# Patient Record
Sex: FEMALE | Race: WHITE | Employment: PART TIME | URBAN - METROPOLITAN AREA
[De-identification: names, ages, dates, MRNs, and addresses within clinical notes are randomized per-mention and may not be internally consistent; named-entity substitution may affect disease eponyms.]

---

## 2017-02-08 ENCOUNTER — HOSPITAL ENCOUNTER (OUTPATIENT)
Dept: MRI IMAGING | Facility: HOSPITAL | Age: 57
Discharge: HOME | End: 2017-02-08
Attending: INTERNAL MEDICINE
Payer: COMMERCIAL

## 2017-02-08 ENCOUNTER — HOSPITAL ENCOUNTER (OUTPATIENT)
Dept: MAMMOGRAPHY | Facility: HOSPITAL | Age: 57
Discharge: HOME | End: 2017-02-08
Attending: INTERNAL MEDICINE
Payer: COMMERCIAL

## 2017-02-08 DIAGNOSIS — I89.0 LYMPHEDEMA: ICD-10-CM

## 2017-02-08 DIAGNOSIS — C50.811 CANCER OF OVERLAPPING SITES OF RIGHT FEMALE BREAST (HCC): ICD-10-CM

## 2017-02-08 DIAGNOSIS — R92.2 DENSE BREASTS: ICD-10-CM

## 2017-02-08 LAB — CREAT BLD-MCNC: 1 MG/DL (ref 0.5–1.5)

## 2017-02-08 PROCEDURE — 77066 DX MAMMO INCL CAD BI: CPT

## 2017-02-08 PROCEDURE — 82565 ASSAY OF CREATININE: CPT

## 2017-02-08 PROCEDURE — 77059 MRI BREAST (W+WO) W/CAD BILAT (CPT=77059/0159T): CPT

## 2017-02-08 PROCEDURE — A9575 INJ GADOTERATE MEGLUMI 0.1ML: HCPCS | Performed by: INTERNAL MEDICINE

## 2017-02-08 PROCEDURE — 0159T MRI BREAST (W+WO) W/CAD BILAT (CPT=77059/0159T): CPT

## 2017-02-13 ENCOUNTER — TELEPHONE (OUTPATIENT)
Dept: HEMATOLOGY/ONCOLOGY | Facility: HOSPITAL | Age: 57
End: 2017-02-13

## 2017-02-13 NOTE — TELEPHONE ENCOUNTER
Tracie Guillen with MRI - spoke to  yesterday  She did fall on the exercise machine about 2-3 weeks - trauma accounts for the MRI findings - pain was in this location for several weeks

## 2017-02-14 ENCOUNTER — TELEPHONE (OUTPATIENT)
Dept: OBGYN CLINIC | Facility: CLINIC | Age: 57
End: 2017-02-14

## 2017-03-20 ENCOUNTER — NURSE ONLY (OUTPATIENT)
Dept: HEMATOLOGY/ONCOLOGY | Facility: HOSPITAL | Age: 57
End: 2017-03-20
Attending: INTERNAL MEDICINE
Payer: COMMERCIAL

## 2017-03-20 VITALS
WEIGHT: 137 LBS | SYSTOLIC BLOOD PRESSURE: 126 MMHG | HEART RATE: 56 BPM | HEIGHT: 65.5 IN | RESPIRATION RATE: 16 BRPM | DIASTOLIC BLOOD PRESSURE: 78 MMHG | BODY MASS INDEX: 22.55 KG/M2 | TEMPERATURE: 99 F

## 2017-03-20 DIAGNOSIS — Z79.811 ENCOUNTER FOR MONITORING AROMATASE INHIBITOR THERAPY: ICD-10-CM

## 2017-03-20 DIAGNOSIS — I89.0 LYMPHEDEMA OF ARM: ICD-10-CM

## 2017-03-20 DIAGNOSIS — C50.811 CANCER OF OVERLAPPING SITES OF RIGHT FEMALE BREAST (HCC): ICD-10-CM

## 2017-03-20 DIAGNOSIS — Z45.2 ENCOUNTER FOR ADJUSTMENT OR MANAGEMENT OF VASCULAR ACCESS DEVICE: ICD-10-CM

## 2017-03-20 DIAGNOSIS — R92.2 DENSE BREASTS: ICD-10-CM

## 2017-03-20 DIAGNOSIS — M85.80 OSTEOPENIA DETERMINED BY X-RAY: ICD-10-CM

## 2017-03-20 DIAGNOSIS — C50.811 CANCER OF OVERLAPPING SITES OF RIGHT FEMALE BREAST (HCC): Primary | ICD-10-CM

## 2017-03-20 DIAGNOSIS — Z51.81 ENCOUNTER FOR MONITORING AROMATASE INHIBITOR THERAPY: ICD-10-CM

## 2017-03-20 DIAGNOSIS — D12.6 TUBULOVILLOUS ADENOMA OF COLON: ICD-10-CM

## 2017-03-20 DIAGNOSIS — C68.9 UROTHELIAL CARCINOMA (HCC): ICD-10-CM

## 2017-03-20 DIAGNOSIS — C68.9 UROTHELIAL CARCINOMA (HCC): Primary | ICD-10-CM

## 2017-03-20 LAB
ALBUMIN SERPL BCP-MCNC: 3.8 G/DL (ref 3.5–4.8)
ALBUMIN/GLOB SERPL: 1.4 {RATIO} (ref 1–2)
ALP SERPL-CCNC: 72 U/L (ref 32–100)
ALT SERPL-CCNC: 13 U/L (ref 14–54)
ANION GAP SERPL CALC-SCNC: 8 MMOL/L (ref 0–18)
AST SERPL-CCNC: 20 U/L (ref 15–41)
BASOPHILS # BLD: 0 K/UL (ref 0–0.2)
BASOPHILS NFR BLD: 0 %
BILIRUB SERPL-MCNC: 1 MG/DL (ref 0.3–1.2)
BUN SERPL-MCNC: 18 MG/DL (ref 8–20)
BUN/CREAT SERPL: 18.4 (ref 10–20)
CALCIUM SERPL-MCNC: 9.3 MG/DL (ref 8.5–10.5)
CHLORIDE SERPL-SCNC: 103 MMOL/L (ref 95–110)
CO2 SERPL-SCNC: 27 MMOL/L (ref 22–32)
CREAT SERPL-MCNC: 0.98 MG/DL (ref 0.5–1.5)
EOSINOPHIL # BLD: 0.1 K/UL (ref 0–0.7)
EOSINOPHIL NFR BLD: 3 %
ERYTHROCYTE [DISTWIDTH] IN BLOOD BY AUTOMATED COUNT: 13.7 % (ref 11–15)
GLOBULIN PLAS-MCNC: 2.7 G/DL (ref 2.5–3.7)
GLUCOSE SERPL-MCNC: 97 MG/DL (ref 70–99)
HCT VFR BLD AUTO: 35.9 % (ref 35–48)
HGB BLD-MCNC: 11.8 G/DL (ref 12–16)
LYMPHOCYTES # BLD: 0.8 K/UL (ref 1–4)
LYMPHOCYTES NFR BLD: 21 %
MCH RBC QN AUTO: 29.6 PG (ref 27–32)
MCHC RBC AUTO-ENTMCNC: 33 G/DL (ref 32–37)
MCV RBC AUTO: 89.8 FL (ref 80–100)
MONOCYTES # BLD: 0.2 K/UL (ref 0–1)
MONOCYTES NFR BLD: 6 %
NEUTROPHILS # BLD AUTO: 2.8 K/UL (ref 1.8–7.7)
NEUTROPHILS NFR BLD: 70 %
OSMOLALITY UR CALC.SUM OF ELEC: 288 MOSM/KG (ref 275–295)
PLATELET # BLD AUTO: 186 K/UL (ref 140–400)
PMV BLD AUTO: 8.4 FL (ref 7.4–10.3)
POTASSIUM SERPL-SCNC: 4.4 MMOL/L (ref 3.3–5.1)
PROT SERPL-MCNC: 6.5 G/DL (ref 5.9–8.4)
RBC # BLD AUTO: 4 M/UL (ref 3.7–5.4)
SODIUM SERPL-SCNC: 138 MMOL/L (ref 136–144)
WBC # BLD AUTO: 4 K/UL (ref 4–11)

## 2017-03-20 PROCEDURE — 99212 OFFICE O/P EST SF 10 MIN: CPT | Performed by: INTERNAL MEDICINE

## 2017-03-20 PROCEDURE — 36591 DRAW BLOOD OFF VENOUS DEVICE: CPT

## 2017-03-20 PROCEDURE — 85025 COMPLETE CBC W/AUTO DIFF WBC: CPT

## 2017-03-20 PROCEDURE — 80053 COMPREHEN METABOLIC PANEL: CPT

## 2017-03-20 PROCEDURE — 99214 OFFICE O/P EST MOD 30 MIN: CPT | Performed by: INTERNAL MEDICINE

## 2017-03-20 RX ORDER — HEPARIN SODIUM (PORCINE) LOCK FLUSH IV SOLN 100 UNIT/ML 100 UNIT/ML
SOLUTION INTRAVENOUS
Status: COMPLETED
Start: 2017-03-20 | End: 2017-03-20

## 2017-03-20 RX ORDER — 0.9 % SODIUM CHLORIDE 0.9 %
VIAL (ML) INJECTION
Status: DISCONTINUED
Start: 2017-03-20 | End: 2017-03-20

## 2017-03-20 RX ORDER — HEPARIN SODIUM (PORCINE) LOCK FLUSH IV SOLN 100 UNIT/ML 100 UNIT/ML
5 SOLUTION INTRAVENOUS ONCE
Status: COMPLETED | OUTPATIENT
Start: 2017-03-20 | End: 2017-03-20

## 2017-03-20 RX ADMIN — HEPARIN SODIUM (PORCINE) LOCK FLUSH IV SOLN 100 UNIT/ML 500 UNITS: 100 SOLUTION INTRAVENOUS at 11:39:00

## 2017-03-20 NOTE — PROGRESS NOTES
Pt arrived to lab for port flush and labs. Pt states she has been feeling well, no complaints at this time. Port accessed to chest, positive blood return noted. CBC, CMP drawn and sent to lab. Port flushed with 20 ml of saline and 500 units of Heparin.

## 2017-03-20 NOTE — PROGRESS NOTES
HPI   Dylan Stacy is a 61year old female who returns for follow-up of her urothelial carcinoma and breast cancer. She did have a repeat cystoscopy, CT scans at [facility] in December 2016 as well as a colonoscopy.   She is not aware of a change in either b She is enjoying spending more time with her  and helping with the vacation home    HENT: Negative for congestion, dental problem, mouth sores and sinus pressure. Eyes: Positive for visual disturbance. Wears reading glasses.   Admits to [place] Allergies:   No Known Allergies    Past Medical History   Diagnosis Date   • Cervical dysplasia    • Pregnancy , , 1992     x 3 vag vacuum, sp ,    • Herpes zoster    • Radicular pain      HERPES ZOSTER / MARCAINE DEPO MEDROL T10-12   • Family History   Problem Relation Age of Onset   • Breast Cancer Cousin    • Heart Disorder Father    • Gastro-Intestinal Disorder Mother      diverticulitis   • Genito-Urinary Disorder Sister      urolithiasis   • Diabetes Sister    • Cancer Other      fa Right: No inguinal adenopathy present. Left: No inguinal adenopathy present. Neurological: She is alert and oriented to person, place, and time. No cranial nerve deficit. Skin: Skin is warm and dry. No rash noted. No erythema.    Psychiatric The colonoscopy was repeated in December for a six month study following removal of multiple polyps.        RESULTS:  Component      Latest Ref Rng 3/20/2017   Glucose      70-99 mg/dL 97   Sodium      136-144 mmol/L 138   Potassium      3.3-5.1 mmol/L 4.4 [address] [facility], [address] [place] [address], 1/25/2008, 8:57.  [facility], COLT PF DGTL [place] W CAD, 4/30/2009, 13:54.  [facility], COLT BILATERAL WITH CAD, 11/29/2007, 0:00.  [address] [facility] There are no suspicious areas of enhancement concerning for malignancy.  The visualized axilla demonstrates no morphologically abnormal lymph nodes.         EXTRAMAMMARY FINDINGS:     There is enhancement involving one of the right anterior ribs (54476/100 INDICATIONS: History of right breast cancer 2008 status post lumpectomy, chemotherapy and radiation                                                 therapy.  History of benign left breast biopsy      BREAST COMPOSITION:           CATEGORY c - The breasts ar The preparation of the colon was good.  The visualized colonic mucosa from the cecum to the anal verge was normal with an intact vascular pattern.  The cecal polypectomy site and multiple polyps were addressed as follows:    1.  The cecal polypectomy site GFR 51 potassium 5.6 white blood cell count 4.9 hemoglobin 12.1 platelet count 703,561    Flexible cystoscopy and bladder barbotage 12/14/2016   urethra normal, no evidence of disease in the bladder, bladder barbotage was sent for cytologic analysis and FI Patient received a discharge summary from the technologist after completion  of exam.   Dictated by (CST): Jacobo Reed MD on 2/10/2016 at 8:49  Approved by (CST): Jacobo Reed MD on 2/10/2016 at 8:53  Date of Exam:  02/10/2016     OR Low bone mass (T score between -1.0 and -2.5 at the femoral neck or     spine) and a 10-year probability of a hip fracture > 3% or a 10-year  probability of a major osteoporosis-related fracture > 20% based on the  US-adapted WHO algorithm     CONCLUS the left sided Port-A-Cath along the anterior aspect of the pectoralis. RIGHT:  Bandlike linear scarring in the axillary tail. No suspicious mass or  non-mass enhancement. No suspicious lymphadenopathy.      OTHER:  Prominent normal vascularity left par

## 2017-04-09 RX ORDER — ANASTROZOLE 1 MG/1
1 TABLET ORAL DAILY
Qty: 90 TABLET | Refills: 3 | Status: SHIPPED | OUTPATIENT
Start: 2017-04-09 | End: 2017-07-08

## 2017-04-12 ENCOUNTER — OFFICE VISIT (OUTPATIENT)
Dept: OBGYN CLINIC | Facility: CLINIC | Age: 57
End: 2017-04-12

## 2017-04-12 VITALS
SYSTOLIC BLOOD PRESSURE: 117 MMHG | DIASTOLIC BLOOD PRESSURE: 79 MMHG | BODY MASS INDEX: 21.53 KG/M2 | HEIGHT: 66.75 IN | WEIGHT: 137.19 LBS | HEART RATE: 60 BPM

## 2017-04-12 DIAGNOSIS — Z01.419 ENCOUNTER FOR GYNECOLOGICAL EXAMINATION WITHOUT ABNORMAL FINDING: Primary | ICD-10-CM

## 2017-04-12 DIAGNOSIS — Z85.3 HX: BREAST CANCER: ICD-10-CM

## 2017-04-12 PROCEDURE — 99396 PREV VISIT EST AGE 40-64: CPT | Performed by: OBSTETRICS & GYNECOLOGY

## 2017-04-12 NOTE — PROGRESS NOTES
Tahira Lovelace is a 61year old female [id number] No LMP recorded. Patient has had a hysterectomy. who presents for Patient presents with:  Gyn Exam: annual exam & mammo order  She has no complaints.  She wears a hand brace for lymphedema but otherwise is doing Children: N/A     Occupational History  None on file     Social History Main Topics   Smoking status: Former Smoker     Smokeless tobacco: Never Used    Alcohol Use: Yes  3.0 oz/week    1 Glasses of wine, 4 Standard drinks or equivalent per week         Co or anxiety. Endocrine:   denies excessive thirst or urination. Heme/Lymph:  denies history of anemia, easy bruising or bleeding.       PHYSICAL EXAM:   Constitutional: well developed, well nourished  Head/Face: normocephalic  Neck/Thyroid: thyroid symmetr

## 2017-06-12 ENCOUNTER — NURSE ONLY (OUTPATIENT)
Dept: HEMATOLOGY/ONCOLOGY | Facility: HOSPITAL | Age: 57
End: 2017-06-12
Attending: INTERNAL MEDICINE
Payer: COMMERCIAL

## 2017-06-12 VITALS
HEART RATE: 59 BPM | WEIGHT: 135 LBS | DIASTOLIC BLOOD PRESSURE: 82 MMHG | TEMPERATURE: 98 F | BODY MASS INDEX: 22.22 KG/M2 | HEIGHT: 65.5 IN | SYSTOLIC BLOOD PRESSURE: 132 MMHG | RESPIRATION RATE: 16 BRPM

## 2017-06-12 DIAGNOSIS — C50.811 CANCER OF OVERLAPPING SITES OF RIGHT FEMALE BREAST (HCC): ICD-10-CM

## 2017-06-12 DIAGNOSIS — Z17.0 MALIGNANT NEOPLASM OF OVERLAPPING SITES OF RIGHT BREAST IN FEMALE, ESTROGEN RECEPTOR POSITIVE (HCC): Primary | ICD-10-CM

## 2017-06-12 DIAGNOSIS — C68.9 UROTHELIAL CARCINOMA (HCC): ICD-10-CM

## 2017-06-12 DIAGNOSIS — M85.80 OSTEOPENIA DETERMINED BY X-RAY: ICD-10-CM

## 2017-06-12 DIAGNOSIS — C68.9 UROTHELIAL CARCINOMA (HCC): Primary | ICD-10-CM

## 2017-06-12 DIAGNOSIS — I89.0 LYMPHEDEMA OF ARM: ICD-10-CM

## 2017-06-12 DIAGNOSIS — C50.811 MALIGNANT NEOPLASM OF OVERLAPPING SITES OF RIGHT BREAST IN FEMALE, ESTROGEN RECEPTOR POSITIVE (HCC): Primary | ICD-10-CM

## 2017-06-12 DIAGNOSIS — R92.2 DENSE BREASTS: ICD-10-CM

## 2017-06-12 DIAGNOSIS — Z45.2 ENCOUNTER FOR ADJUSTMENT OR MANAGEMENT OF VASCULAR ACCESS DEVICE: ICD-10-CM

## 2017-06-12 DIAGNOSIS — Z79.811 ENCOUNTER FOR MONITORING AROMATASE INHIBITOR THERAPY: ICD-10-CM

## 2017-06-12 DIAGNOSIS — D12.6 TUBULOVILLOUS ADENOMA OF COLON: ICD-10-CM

## 2017-06-12 DIAGNOSIS — F32.9 REACTIVE DEPRESSION: ICD-10-CM

## 2017-06-12 DIAGNOSIS — Z51.81 ENCOUNTER FOR MONITORING AROMATASE INHIBITOR THERAPY: ICD-10-CM

## 2017-06-12 PROCEDURE — 99212 OFFICE O/P EST SF 10 MIN: CPT | Performed by: INTERNAL MEDICINE

## 2017-06-12 PROCEDURE — 80053 COMPREHEN METABOLIC PANEL: CPT

## 2017-06-12 PROCEDURE — 36591 DRAW BLOOD OFF VENOUS DEVICE: CPT

## 2017-06-12 PROCEDURE — 99214 OFFICE O/P EST MOD 30 MIN: CPT | Performed by: INTERNAL MEDICINE

## 2017-06-12 PROCEDURE — 85025 COMPLETE CBC W/AUTO DIFF WBC: CPT

## 2017-06-12 RX ORDER — 0.9 % SODIUM CHLORIDE 0.9 %
VIAL (ML) INJECTION
Status: DISCONTINUED
Start: 2017-06-12 | End: 2017-06-12

## 2017-06-12 RX ORDER — HEPARIN SODIUM (PORCINE) LOCK FLUSH IV SOLN 100 UNIT/ML 100 UNIT/ML
5 SOLUTION INTRAVENOUS ONCE
Status: COMPLETED | OUTPATIENT
Start: 2017-06-12 | End: 2017-06-12

## 2017-06-12 RX ORDER — HEPARIN SODIUM (PORCINE) LOCK FLUSH IV SOLN 100 UNIT/ML 100 UNIT/ML
SOLUTION INTRAVENOUS
Status: DISCONTINUED
Start: 2017-06-12 | End: 2017-06-12

## 2017-06-12 RX ADMIN — HEPARIN SODIUM (PORCINE) LOCK FLUSH IV SOLN 100 UNIT/ML 500 UNITS: 100 SOLUTION INTRAVENOUS at 13:44:00

## 2017-06-12 NOTE — PROGRESS NOTES
Pt to infusion for port flush and labs. Arrived stable and ambulatory. Pt reports feeling well overall, offers no complaints. Port accessed using sterile technique, line flushing well with positive blood return noted. Labs were collected and sent.  Port flu

## 2017-06-13 NOTE — PROGRESS NOTES
BRYANT Conner is a 61year old female who returns for follow-up of her urothelial carcinoma and breast cancer. She did have a repeat cystoscopy, CT scans at [facility] in December 2016 as well as a colonoscopy.   She is not aware of a change in either b Constitutional: Positive for activity change. Negative for chills, appetite change and unexpected weight change. Exercising with walking only since wrist fracture  Weight gain 7 lbs since December 2016  Continues to work.   She is enjoying spending m Calcium Carbonate-Vitamin D (CALCIUM + D OR) Take 1 tablet by mouth daily. Disp:  Rfl:    ibuprofen (ADVIL) 200 MG Oral Tab Take 200 mg by mouth every 6 (six) hours as needed for Pain.  Disp:  Rfl:      Allergies:   No Known Allergies    Past Medical Histor Drug Use: No    Sexual Activity: Yes    Birth Control/ Protection: Hysterectomy     Other Topics Concern    Caffeine Concern Yes    Comment: COFFEE 1 CUP/DAILY     Social History Narrative     Family History   Problem Relation Age of Onset   • Breast Canc Head (left side): No submental, no submandibular, no preauricular and no posterior auricular adenopathy present. She has no cervical adenopathy. She has no axillary adenopathy. Right: No inguinal adenopathy present.         Left: No ingu She plans to continue the lexapro 10 mg daily. She is again enjoying her family, friends, and work. She and her family are involved in their vacation [address] for residential.       Patient had a DEXA scan in February, 2016, which showed o 0.0-1.0 K/UL 0.3   Eosinophils Absolute      0.0-0.7 K/UL 0.1   Basophils Absolute      0.0-0.2 K/UL 0.0        Final result (2/9/2017  1:45 PM) Open        Study Result      PROCEDURE:   MRI BREAST (W+WO) W/CAD BILAT (CPT=77059/0159T)      COMPARISON: LEFT BREAST: Susceptibility artifact from the biopsy clip marking the site of the benign left breast biopsy is seen in the outer anterior left breast (3/115).  A portacatheter is seen in the subcutaneous tissue of the upper inner left breast/chest wall.   [address] [facility] PF DGTL DIAG W CAD [place], 1/16/2014, 10:21.  [facility] [place], [place] PF DGTL DIAG W CAD [place], 2/06/2015, 9:11.  [facility], MRI BREAST Klaus Arndtnandez Bhandari [address], 3/04/2015, 13:31.   After informed consent, the patient was placed in the left lateral recumbent position.  Digital rectal examination revealed no palpable intraluminal abnormalities.  An Olympus variable stiffness 190 series HD colonoscope was inserted into the rectum and ad Recommendations:  1.  Follow-up biopsy results. 2.  Further recommendations pending        Tony Ferreira MD  12/6/2016      CT 12/14/16 [facility]   Stable size and appearance of two right hepatic lobe lesions compatible with hemangiomas.   Probable cho CONCLUSION:     BI-RADS CATEGORY:  DIAGNOSTIC CATEGORY 2--BENIGN FINDING:     RECOMMENDATIONS:  ROUTINE SCREENING MAMMOGRAM AND CLINICAL EVALUATION. PLEASE NOTE: NORMAL MAMMOGRAM DOES NOT EXCLUDE THE POSSIBILITY OF BREAST  CANCER.   A CLINICALLY S National Osteoporosis Foundation Clinician's Guide to Prevention and  Treatment of Osteoporosis recommendations for treatment:  Post menopausal women and men age 48 and older presenting with the  following should be considered for treatment:       A hip or ENHANCEMENT PATTERN: None or minimal, with <25% of glandular tissue                       demonstrating enhancement. FINDINGS:  LEFT:  A ferromagnetic artifact at 3:00 anterior left breast (image 90 series 2)  related to previous benign biopsy site.  No D. Sigmoid colon polyp:     *  Multiple fragments of tubulovillous adenoma.           TISSUES SUBMITTED                   A. Polyp, cecum   B. Polyp, ascending colon   C. Polyp, hepatic flexure colon   D.  Polyp, sigmoid colon       Meds This Visit:  see Estefany Martins

## 2017-09-06 ENCOUNTER — APPOINTMENT (OUTPATIENT)
Dept: HEMATOLOGY/ONCOLOGY | Facility: HOSPITAL | Age: 57
End: 2017-09-06
Attending: INTERNAL MEDICINE
Payer: COMMERCIAL

## 2017-09-18 ENCOUNTER — APPOINTMENT (OUTPATIENT)
Dept: HEMATOLOGY/ONCOLOGY | Facility: HOSPITAL | Age: 57
End: 2017-09-18
Attending: INTERNAL MEDICINE
Payer: COMMERCIAL

## 2017-09-28 ENCOUNTER — OFFICE VISIT (OUTPATIENT)
Dept: HEMATOLOGY/ONCOLOGY | Facility: HOSPITAL | Age: 57
End: 2017-09-28
Attending: INTERNAL MEDICINE
Payer: COMMERCIAL

## 2017-09-28 VITALS
DIASTOLIC BLOOD PRESSURE: 81 MMHG | TEMPERATURE: 99 F | RESPIRATION RATE: 16 BRPM | SYSTOLIC BLOOD PRESSURE: 119 MMHG | HEIGHT: 65.5 IN | HEART RATE: 56 BPM | WEIGHT: 137 LBS | BODY MASS INDEX: 22.55 KG/M2

## 2017-09-28 DIAGNOSIS — Z17.0 MALIGNANT NEOPLASM OF OVERLAPPING SITES OF RIGHT BREAST IN FEMALE, ESTROGEN RECEPTOR POSITIVE (HCC): ICD-10-CM

## 2017-09-28 DIAGNOSIS — Z17.0 MALIGNANT NEOPLASM OF OVERLAPPING SITES OF RIGHT BREAST IN FEMALE, ESTROGEN RECEPTOR POSITIVE (HCC): Primary | ICD-10-CM

## 2017-09-28 DIAGNOSIS — Z51.81 ENCOUNTER FOR MONITORING AROMATASE INHIBITOR THERAPY: ICD-10-CM

## 2017-09-28 DIAGNOSIS — M85.80 OSTEOPENIA DETERMINED BY X-RAY: ICD-10-CM

## 2017-09-28 DIAGNOSIS — C50.811 MALIGNANT NEOPLASM OF OVERLAPPING SITES OF RIGHT BREAST IN FEMALE, ESTROGEN RECEPTOR POSITIVE (HCC): Primary | ICD-10-CM

## 2017-09-28 DIAGNOSIS — Z79.811 ENCOUNTER FOR MONITORING AROMATASE INHIBITOR THERAPY: ICD-10-CM

## 2017-09-28 DIAGNOSIS — D12.6 TUBULOVILLOUS ADENOMA OF COLON: ICD-10-CM

## 2017-09-28 DIAGNOSIS — C68.9 UROTHELIAL CARCINOMA (HCC): Primary | ICD-10-CM

## 2017-09-28 DIAGNOSIS — Z45.2 ENCOUNTER FOR ADJUSTMENT OR MANAGEMENT OF VASCULAR ACCESS DEVICE: ICD-10-CM

## 2017-09-28 DIAGNOSIS — C50.811 MALIGNANT NEOPLASM OF OVERLAPPING SITES OF RIGHT BREAST IN FEMALE, ESTROGEN RECEPTOR POSITIVE (HCC): ICD-10-CM

## 2017-09-28 DIAGNOSIS — C68.9 UROTHELIAL CARCINOMA (HCC): ICD-10-CM

## 2017-09-28 DIAGNOSIS — R92.2 DENSE BREASTS: ICD-10-CM

## 2017-09-28 LAB
ALBUMIN SERPL BCP-MCNC: 3.5 G/DL (ref 3.5–4.8)
ALBUMIN/GLOB SERPL: 1.5 {RATIO} (ref 1–2)
ALP SERPL-CCNC: 56 U/L (ref 32–100)
ALT SERPL-CCNC: 11 U/L (ref 14–54)
ANION GAP SERPL CALC-SCNC: 3 MMOL/L (ref 0–18)
AST SERPL-CCNC: 18 U/L (ref 15–41)
BASOPHILS # BLD: 0 K/UL (ref 0–0.2)
BASOPHILS NFR BLD: 1 %
BILIRUB SERPL-MCNC: 0.7 MG/DL (ref 0.3–1.2)
BUN SERPL-MCNC: 18 MG/DL (ref 8–20)
BUN/CREAT SERPL: 16.1 (ref 10–20)
CALCIUM SERPL-MCNC: 9.1 MG/DL (ref 8.5–10.5)
CHLORIDE SERPL-SCNC: 107 MMOL/L (ref 95–110)
CO2 SERPL-SCNC: 29 MMOL/L (ref 22–32)
CREAT SERPL-MCNC: 1.12 MG/DL (ref 0.5–1.5)
EOSINOPHIL # BLD: 0.1 K/UL (ref 0–0.7)
EOSINOPHIL NFR BLD: 4 %
ERYTHROCYTE [DISTWIDTH] IN BLOOD BY AUTOMATED COUNT: 13.5 % (ref 11–15)
GLOBULIN PLAS-MCNC: 2.4 G/DL (ref 2.5–3.7)
GLUCOSE SERPL-MCNC: 95 MG/DL (ref 70–99)
HCT VFR BLD AUTO: 36.7 % (ref 35–48)
HGB BLD-MCNC: 12.1 G/DL (ref 12–16)
LYMPHOCYTES # BLD: 1.2 K/UL (ref 1–4)
LYMPHOCYTES NFR BLD: 33 %
MCH RBC QN AUTO: 29.9 PG (ref 27–32)
MCHC RBC AUTO-ENTMCNC: 32.8 G/DL (ref 32–37)
MCV RBC AUTO: 91.2 FL (ref 80–100)
MONOCYTES # BLD: 0.3 K/UL (ref 0–1)
MONOCYTES NFR BLD: 8 %
NEUTROPHILS # BLD AUTO: 2 K/UL (ref 1.8–7.7)
NEUTROPHILS NFR BLD: 55 %
OSMOLALITY UR CALC.SUM OF ELEC: 290 MOSM/KG (ref 275–295)
PLATELET # BLD AUTO: 180 K/UL (ref 140–400)
PMV BLD AUTO: 8.7 FL (ref 7.4–10.3)
POTASSIUM SERPL-SCNC: 4.9 MMOL/L (ref 3.3–5.1)
PROT SERPL-MCNC: 5.9 G/DL (ref 5.9–8.4)
RBC # BLD AUTO: 4.03 M/UL (ref 3.7–5.4)
SODIUM SERPL-SCNC: 139 MMOL/L (ref 136–144)
WBC # BLD AUTO: 3.6 K/UL (ref 4–11)

## 2017-09-28 PROCEDURE — 99214 OFFICE O/P EST MOD 30 MIN: CPT | Performed by: INTERNAL MEDICINE

## 2017-09-28 PROCEDURE — 80053 COMPREHEN METABOLIC PANEL: CPT

## 2017-09-28 PROCEDURE — 36415 COLL VENOUS BLD VENIPUNCTURE: CPT

## 2017-09-28 PROCEDURE — 96523 IRRIG DRUG DELIVERY DEVICE: CPT

## 2017-09-28 PROCEDURE — 36593 DECLOT VASCULAR DEVICE: CPT

## 2017-09-28 PROCEDURE — A4216 STERILE WATER/SALINE, 10 ML: HCPCS

## 2017-09-28 PROCEDURE — 85025 COMPLETE CBC W/AUTO DIFF WBC: CPT

## 2017-09-28 PROCEDURE — 99212 OFFICE O/P EST SF 10 MIN: CPT | Performed by: INTERNAL MEDICINE

## 2017-09-28 RX ORDER — HEPARIN SODIUM (PORCINE) LOCK FLUSH IV SOLN 100 UNIT/ML 100 UNIT/ML
5 SOLUTION INTRAVENOUS ONCE
Status: CANCELLED | OUTPATIENT
Start: 2017-09-28

## 2017-09-28 RX ORDER — 0.9 % SODIUM CHLORIDE 0.9 %
VIAL (ML) INJECTION
Status: DISCONTINUED
Start: 2017-09-28 | End: 2017-09-28

## 2017-09-28 RX ORDER — HEPARIN SODIUM (PORCINE) LOCK FLUSH IV SOLN 100 UNIT/ML 100 UNIT/ML
SOLUTION INTRAVENOUS
Status: DISCONTINUED
Start: 2017-09-28 | End: 2017-09-28

## 2017-09-28 RX ORDER — ANASTROZOLE 1 MG/1
1 TABLET ORAL ONCE
COMMUNITY
Start: 2017-09-21 | End: 2017-12-29

## 2017-09-28 RX ORDER — 0.9 % SODIUM CHLORIDE 0.9 %
10 VIAL (ML) INJECTION ONCE
Status: CANCELLED | OUTPATIENT
Start: 2017-09-28

## 2017-09-28 RX ORDER — HEPARIN SODIUM (PORCINE) LOCK FLUSH IV SOLN 100 UNIT/ML 100 UNIT/ML
5 SOLUTION INTRAVENOUS ONCE
Status: DISCONTINUED | OUTPATIENT
Start: 2017-09-28 | End: 2017-09-28

## 2017-09-28 NOTE — PROGRESS NOTES
Marcelle Gutierrez presents today for labs from port. Port accessed using sterile technique with no blood return noted initially. Marcelle Gutierrez repositioned several times, scant blood return noted then able to withdraw waste blood then unable to collect labs.  Port flushe

## 2017-10-02 ENCOUNTER — TELEPHONE (OUTPATIENT)
Dept: GASTROENTEROLOGY | Facility: CLINIC | Age: 57
End: 2017-10-02

## 2017-10-02 NOTE — TELEPHONE ENCOUNTER
Recall colon in 1 year by Dr. Justus Soler . Chanel Pale colon done 12/6/16; results showed:   \"1.  Cecal polypectomy site with clip artifact without visible polyp recurrence   2.  Colon polyps   3.  Pancolonic diverticulosis\"     Next CLN due 12/6/17     Ross

## 2017-10-02 NOTE — PROGRESS NOTES
BRYANT Stacy is a 61year old female who returns for follow-up of her urothelial carcinoma and breast cancer. She did have repeat CT scans of the abdomen and pelvis and chest x-ray at [facility] 9/13/2017. She is to be seen by Dr. Oral Lewis in follow-up. RIGHT NEPHROURETERECTOMY at [facility] by Dr. Darylene Ear:  Rt retroperitoneal and pelvis lymph nodes; dissection:  11 lymph nodes, no tumor.   Rt kidney, ureter, stent and bladder cuff; nephroureterectomy:  All margins of resection, free of tumor anastrozole 1 MG Oral Tab tab Take 1 mg by mouth one time. Disp:  Rfl:    ESCITALOPRAM 20 MG Oral Tab TAKE 1 TABLET (20 MG TOTAL) BY MOUTH DAILY. Disp: 30 tablet Rfl: 11   acetaminophen 325 MG Oral Tab Take by mouth.  Disp:  Rfl:    Calcium Carbonate-Vitami Years of education: N/A  Number of children: N/A     Occupational History  None on file     Social History Main Topics   Smoking status: Former Smoker     Smokeless tobacco: Never Used    Alcohol use Yes  3.0 oz/week    1 Glasses of wine, 4 Standard drink Abdominal: Soft. Bowel sounds are normal. She exhibits no distension and no mass. There is no tenderness. Surgical scars   Musculoskeletal: Normal range of motion. She exhibits no edema.    Lymphedema improved with minimal at the dorsum of the hand   No l She has minimal residual lymphedema in the right arm / hand post surgery and radiation therapy. She has not had an increase in discomfort. She continues to wear her compression sleeve and glove intermittently.   She is somewhat concerned with the risk for 12.0 - 16.0 g/dL 12.1 12.4   Hematocrit      35.0 - 48.0 % 36.7 38.0   MCV      80.0 - 100.0 fL 91.2 91.1   MCH      27.0 - 32.0 pg 29.9 29.8   MCHC      32.0 - 37.0 g/dl 32.8 32.7   RDW      11.0 - 15.0 % 13.5 13.7   Platelet Count      538 - 400 K/UL ENHANCEMENT PATTERN:   There is heterogenous tissue.  Background parenchymal enhancement is minimal, with <25% of glandular tissue demonstrating enhancement.      KEY: (#/#) = (Series/Image number)  All measurements, unless otherwise specified, are listed this is could be pathologic fracture and to see if there are any other sites concerning for metastasis.  A bone scan would not differentiate between a fracture and lesion.   The right rib finding was discussed with Dr. Mikaela Ramos.      BI-RADS CATEGORY:      BI-RADS CATEGORY:       DIAGNOSTIC CATEGORY 2--BENIGN FINDING:     Postsurgical/treatment changes right breast, stable.  No radiographic evidence of malignancy      RECOMMENDATIONS:    ANNUAL MAMMOGRAM AND CLINICAL        Technique:  12/6/16  After informed Inspection of all the above sites revealed no evidence of ongoing bleeding.  There were scattered diverticula seen throughout the colon without current complication.  There were no other colonic polyps, mass lesions, vascular anomalies or signs of inflamma 2% decrease     PA LUMBAR SPINE (L1 - L4)       BMD:  0.815 gm/sq. cm.    T SCORE: -2.1       Change since previous:  10.8% increase          T scores are a comparison to sex-matched patients with mean peak bone  mass and are given in standard deviation (s   *  Multiple fragments of tubulovillous adenoma.           TISSUES SUBMITTED                   A. Polyp, cecum   B. Polyp, ascending colon   C. Polyp, hepatic flexure colon   D.  Polyp, sigmoid colon       Meds This Visit:  see list

## 2017-10-19 ENCOUNTER — TELEPHONE (OUTPATIENT)
Dept: GASTROENTEROLOGY | Facility: CLINIC | Age: 57
End: 2017-10-19

## 2017-10-19 DIAGNOSIS — Z86.010 HX OF COLONIC POLYPS: Primary | ICD-10-CM

## 2017-10-19 NOTE — TELEPHONE ENCOUNTER
The patient's chart has been reviewed. Okay to schedule pt for 1 year colonoscopy recall r/t history of colon polyps with Dr. Roberta Rosario. Advise IV [place] sedation with split dose Suprep (eRx) preparation.    May substitute Colyte/TriLyte as Darek Simpson

## 2017-10-24 NOTE — TELEPHONE ENCOUNTER
Scheduled for:  Colonoscopy - [id number]  Provider Name:  Dr. To Pacheco  Date:  2/20/18  Location:  [facility]  Sedation:  IV  Time:  9:30 am (pt is aware to arrive at 8:30 am)  Prep:  Suprep, Prep instructions were given to pt over the phone, pt verbalized understanding.   Patricia Hebert

## 2017-10-24 NOTE — TELEPHONE ENCOUNTER
KEVAN RN's - pt has [address], please advise. I closed the encounter by mistake, sorry. Thank you.

## 2017-10-31 NOTE — TELEPHONE ENCOUNTER
Contaced [employer] and spoke w/ Lali Michaels on 10/31/17 @ 11:14am. She states that pt does not require pre-certification for her CLN.

## 2017-11-22 RX ORDER — ESCITALOPRAM OXALATE 20 MG/1
TABLET ORAL
Qty: 30 TABLET | Refills: 11 | Status: SHIPPED | OUTPATIENT
Start: 2017-11-22 | End: 2018-11-30

## 2017-12-06 ENCOUNTER — TELEPHONE (OUTPATIENT)
Dept: HEMATOLOGY/ONCOLOGY | Facility: HOSPITAL | Age: 57
End: 2017-12-06

## 2017-12-06 DIAGNOSIS — Z17.0 MALIGNANT NEOPLASM OF OVERLAPPING SITES OF RIGHT BREAST IN FEMALE, ESTROGEN RECEPTOR POSITIVE (HCC): Primary | ICD-10-CM

## 2017-12-06 DIAGNOSIS — C50.811 MALIGNANT NEOPLASM OF OVERLAPPING SITES OF RIGHT BREAST IN FEMALE, ESTROGEN RECEPTOR POSITIVE (HCC): Primary | ICD-10-CM

## 2017-12-06 DIAGNOSIS — I89.0 LYMPHEDEMA OF ARM: ICD-10-CM

## 2017-12-06 NOTE — TELEPHONE ENCOUNTER
Order placed- call Rolanda Baldwin- she requested order to be mailed- address verified with her, order mailed.

## 2017-12-06 NOTE — TELEPHONE ENCOUNTER
The patient is calling checking on her request for a order for 3 gloves and one sleeve. She put in the request back on 11/22, she would like to pick this up at the  when ready.  Let us know we can call the patient

## 2017-12-26 ENCOUNTER — TELEPHONE (OUTPATIENT)
Dept: HEMATOLOGY/ONCOLOGY | Facility: HOSPITAL | Age: 57
End: 2017-12-26

## 2017-12-26 NOTE — TELEPHONE ENCOUNTER
Tali Urbano from [address] is asking for an order for the patient. The patient needs arm sleeves and full finger gloves 3 at time.   Tali Urbano can be reached at [phone] Please Advise thanks

## 2017-12-27 NOTE — TELEPHONE ENCOUNTER
Copy of DME order from 12/6/17 faxed to [phone]-- fax receipt confirmed-- Amilcar Session verified she will be using Shay Hernandez for this. Spoke with Sherrie Nielsen she states order is ok to state one sleeve and three lymphatic gloves.

## 2017-12-28 ENCOUNTER — OFFICE VISIT (OUTPATIENT)
Dept: HEMATOLOGY/ONCOLOGY | Facility: HOSPITAL | Age: 57
End: 2017-12-28
Attending: INTERNAL MEDICINE
Payer: COMMERCIAL

## 2017-12-28 VITALS
RESPIRATION RATE: 16 BRPM | SYSTOLIC BLOOD PRESSURE: 125 MMHG | TEMPERATURE: 98 F | DIASTOLIC BLOOD PRESSURE: 78 MMHG | HEART RATE: 67 BPM

## 2017-12-28 VITALS
HEART RATE: 67 BPM | TEMPERATURE: 98 F | BODY MASS INDEX: 22.55 KG/M2 | RESPIRATION RATE: 16 BRPM | HEIGHT: 65.5 IN | SYSTOLIC BLOOD PRESSURE: 125 MMHG | DIASTOLIC BLOOD PRESSURE: 78 MMHG | WEIGHT: 137 LBS

## 2017-12-28 DIAGNOSIS — C50.811 MALIGNANT NEOPLASM OF OVERLAPPING SITES OF RIGHT BREAST IN FEMALE, ESTROGEN RECEPTOR POSITIVE (HCC): ICD-10-CM

## 2017-12-28 DIAGNOSIS — C68.9 UROTHELIAL CARCINOMA (HCC): ICD-10-CM

## 2017-12-28 DIAGNOSIS — D12.6 TUBULOVILLOUS ADENOMA OF COLON: ICD-10-CM

## 2017-12-28 DIAGNOSIS — I89.0 LYMPHEDEMA OF ARM: ICD-10-CM

## 2017-12-28 DIAGNOSIS — Z79.811 ENCOUNTER FOR MONITORING AROMATASE INHIBITOR THERAPY: ICD-10-CM

## 2017-12-28 DIAGNOSIS — R92.2 DENSE BREASTS: ICD-10-CM

## 2017-12-28 DIAGNOSIS — Z17.0 MALIGNANT NEOPLASM OF OVERLAPPING SITES OF RIGHT BREAST IN FEMALE, ESTROGEN RECEPTOR POSITIVE (HCC): ICD-10-CM

## 2017-12-28 DIAGNOSIS — Z51.81 ENCOUNTER FOR MONITORING AROMATASE INHIBITOR THERAPY: ICD-10-CM

## 2017-12-28 DIAGNOSIS — C68.9 UROTHELIAL CARCINOMA (HCC): Primary | ICD-10-CM

## 2017-12-28 DIAGNOSIS — Z95.828 PORT CATHETER IN PLACE: ICD-10-CM

## 2017-12-28 DIAGNOSIS — Z45.2 ENCOUNTER FOR ADJUSTMENT OR MANAGEMENT OF VASCULAR ACCESS DEVICE: Primary | ICD-10-CM

## 2017-12-28 PROCEDURE — 85025 COMPLETE CBC W/AUTO DIFF WBC: CPT

## 2017-12-28 PROCEDURE — 81001 URINALYSIS AUTO W/SCOPE: CPT

## 2017-12-28 PROCEDURE — 36591 DRAW BLOOD OFF VENOUS DEVICE: CPT

## 2017-12-28 PROCEDURE — 99214 OFFICE O/P EST MOD 30 MIN: CPT | Performed by: INTERNAL MEDICINE

## 2017-12-28 PROCEDURE — 80053 COMPREHEN METABOLIC PANEL: CPT

## 2017-12-28 RX ORDER — HEPARIN SODIUM (PORCINE) LOCK FLUSH IV SOLN 100 UNIT/ML 100 UNIT/ML
SOLUTION INTRAVENOUS
Status: COMPLETED
Start: 2017-12-28 | End: 2017-12-28

## 2017-12-28 RX ORDER — 0.9 % SODIUM CHLORIDE 0.9 %
VIAL (ML) INJECTION
Status: DISCONTINUED
Start: 2017-12-28 | End: 2017-12-28

## 2017-12-28 RX ORDER — 0.9 % SODIUM CHLORIDE 0.9 %
10 VIAL (ML) INJECTION ONCE
Status: CANCELLED | OUTPATIENT
Start: 2017-12-28

## 2017-12-28 RX ORDER — HEPARIN SODIUM (PORCINE) LOCK FLUSH IV SOLN 100 UNIT/ML 100 UNIT/ML
5 SOLUTION INTRAVENOUS ONCE
Status: CANCELLED | OUTPATIENT
Start: 2017-12-28

## 2017-12-28 RX ORDER — HEPARIN SODIUM (PORCINE) LOCK FLUSH IV SOLN 100 UNIT/ML 100 UNIT/ML
5 SOLUTION INTRAVENOUS ONCE
Status: COMPLETED | OUTPATIENT
Start: 2017-12-28 | End: 2017-12-28

## 2017-12-28 RX ADMIN — HEPARIN SODIUM (PORCINE) LOCK FLUSH IV SOLN 100 UNIT/ML 500 UNITS: 100 SOLUTION INTRAVENOUS at 14:04:00

## 2017-12-28 NOTE — PROGRESS NOTES
Pt brought back to infusion ambulating without assistance. Port accessed using sterile technique without complication, blood return noted. Labs drawn and sent. Line flushed with 20 cc saline and 500 unit heparin.   Port de-accessed without complication,

## 2017-12-29 RX ORDER — ANASTROZOLE 1 MG/1
TABLET ORAL
Qty: 30 TABLET | Refills: 10 | Status: SHIPPED | OUTPATIENT
Start: 2017-12-29 | End: 2020-01-03

## 2017-12-29 RX ORDER — ANASTROZOLE 1 MG/1
1 TABLET ORAL DAILY
Qty: 90 TABLET | Refills: 3 | Status: SHIPPED | OUTPATIENT
Start: 2017-12-29 | End: 2018-11-13

## 2018-02-14 ENCOUNTER — HOSPITAL ENCOUNTER (OUTPATIENT)
Dept: MAMMOGRAPHY | Facility: HOSPITAL | Age: 58
Discharge: HOME | End: 2018-02-14
Attending: INTERNAL MEDICINE
Payer: COMMERCIAL

## 2018-02-14 DIAGNOSIS — C50.811 MALIGNANT NEOPLASM OF OVERLAPPING SITES OF RIGHT BREAST IN FEMALE, ESTROGEN RECEPTOR POSITIVE (HCC): ICD-10-CM

## 2018-02-14 DIAGNOSIS — Z17.0 MALIGNANT NEOPLASM OF OVERLAPPING SITES OF RIGHT BREAST IN FEMALE, ESTROGEN RECEPTOR POSITIVE (HCC): ICD-10-CM

## 2018-02-14 PROCEDURE — 77066 DX MAMMO INCL CAD BI: CPT | Performed by: INTERNAL MEDICINE

## 2018-02-19 ENCOUNTER — TELEPHONE (OUTPATIENT)
Dept: GASTROENTEROLOGY | Facility: CLINIC | Age: 58
End: 2018-02-19

## 2018-02-19 NOTE — TELEPHONE ENCOUNTER
Call transferred to RN:    Pt wanted to know why she got suprep instead of miraLAX and why she had to do a split dose prep.  I reviewed that the guidelines have changed and that in order to have better visualization and decreased missed rate, a split dose s

## 2018-02-19 NOTE — TELEPHONE ENCOUNTER
Pt is calling with Prep and location instructions for 02/20/2018 CLN procedure please call thank you

## 2018-02-20 ENCOUNTER — SURGERY (OUTPATIENT)
Age: 58
End: 2018-02-20

## 2018-02-20 ENCOUNTER — HOSPITAL ENCOUNTER (OUTPATIENT)
Facility: HOSPITAL | Age: 58
Setting detail: HOSPITAL OUTPATIENT SURGERY
Discharge: HOME | End: 2018-02-20
Attending: INTERNAL MEDICINE | Admitting: INTERNAL MEDICINE
Payer: COMMERCIAL

## 2018-02-20 DIAGNOSIS — Z86.010 HX OF COLONIC POLYPS: ICD-10-CM

## 2018-02-20 PROCEDURE — 45385 COLONOSCOPY W/LESION REMOVAL: CPT | Performed by: INTERNAL MEDICINE

## 2018-02-20 PROCEDURE — 0DBH8ZX EXCISION OF CECUM, VIA NATURAL OR ARTIFICIAL OPENING ENDOSCOPIC, DIAGNOSTIC: ICD-10-PCS | Performed by: INTERNAL MEDICINE

## 2018-02-20 PROCEDURE — G0500 MOD SEDAT ENDO SERVICE >5YRS: HCPCS | Performed by: INTERNAL MEDICINE

## 2018-02-20 PROCEDURE — 0DBN8ZX EXCISION OF SIGMOID COLON, VIA NATURAL OR ARTIFICIAL OPENING ENDOSCOPIC, DIAGNOSTIC: ICD-10-PCS | Performed by: INTERNAL MEDICINE

## 2018-02-20 RX ORDER — SODIUM CHLORIDE 0.9 % (FLUSH) 0.9 %
10 SYRINGE (ML) INJECTION AS NEEDED
Status: DISCONTINUED | OUTPATIENT
Start: 2018-02-20 | End: 2018-02-20

## 2018-02-20 RX ORDER — SODIUM CHLORIDE, SODIUM LACTATE, POTASSIUM CHLORIDE, CALCIUM CHLORIDE 600; 310; 30; 20 MG/100ML; MG/100ML; MG/100ML; MG/100ML
INJECTION, SOLUTION INTRAVENOUS CONTINUOUS
Status: DISCONTINUED | OUTPATIENT
Start: 2018-02-20 | End: 2018-02-20

## 2018-02-20 RX ORDER — MIDAZOLAM HYDROCHLORIDE 1 MG/ML
1 INJECTION INTRAMUSCULAR; INTRAVENOUS EVERY 5 MIN PRN
Status: DISCONTINUED | OUTPATIENT
Start: 2018-02-20 | End: 2018-02-20

## 2018-02-20 RX ORDER — MIDAZOLAM HYDROCHLORIDE 1 MG/ML
INJECTION INTRAMUSCULAR; INTRAVENOUS
Status: DISCONTINUED | OUTPATIENT
Start: 2018-02-20 | End: 2018-02-20

## 2018-02-20 NOTE — H&P
History & Physical Examination    Patient Name: Tahira Lovelace  MRN: [id number]  [facility]: [id number]  YOB: 1960    Diagnosis: Personal history of adenomatous colon polyps and colorectal cancer screening        Prescriptions Prior to Admission:  MERCEDEZ T10-12   • Urothelial carcinoma ([facility] [address] 3/21/2014    right     Past Surgical History:  No date: BREAST LUMPECTOMY      Comment: /EBR/chemo - cytoxan=taxotere  12/6/2016: COLONOSCOPY N/A      Comment: Procedure: COLONOSCOPY;  Surgeon:                Anibal Crowell

## 2018-02-20 NOTE — OPERATIVE REPORT
[facility] Endoscopy Report      Date of Procedure:  02/20/18      Preoperative Diagnosis:  1. Personal history of adenomatous colon polyps  2. Colorectal cancer screening      Postoperative Diagnosis:  1. Small colon polyps  2.   Cecal a there was a 2–3 mm sessile polyp which was cold snare excised and retrieved via suction. 2.  In the proximal sigmoid colon/distal descending there was a 5 mm sessile polyp which was cold snare excised and retrieved via suction.   3.  In the distal sigmoid

## 2018-02-21 VITALS
WEIGHT: 130 LBS | BODY MASS INDEX: 20.89 KG/M2 | RESPIRATION RATE: 10 BRPM | OXYGEN SATURATION: 97 % | HEART RATE: 53 BPM | HEIGHT: 66 IN | SYSTOLIC BLOOD PRESSURE: 124 MMHG | DIASTOLIC BLOOD PRESSURE: 72 MMHG

## 2018-02-22 ENCOUNTER — APPOINTMENT (OUTPATIENT)
Dept: HEMATOLOGY/ONCOLOGY | Facility: HOSPITAL | Age: 58
End: 2018-02-22
Attending: INTERNAL MEDICINE
Payer: COMMERCIAL

## 2018-03-15 NOTE — PROGRESS NOTES
BRYANT Spear is a 60year old female who returns for follow-up of her urothelial carcinoma and breast cancer. She did have repeat CT scans of the abdomen and pelvis and chest x-ray at [facility] 9/13/2017. She is to be seen by Dr. Saurabh Jacob in follow-up. RIGHT NEPHROURETERECTOMY at [facility] by Dr. Prachi Crawley:  Rt retroperitoneal and pelvis lymph nodes; dissection:  11 lymph nodes, no tumor.   Rt kidney, ureter, stent and bladder cuff; nephroureterectomy:  All margins of resection, free of tumor anastrozole 1 MG Oral Tab tab Take 1 tablet (1 mg total) by mouth daily. Disp: 90 tablet Rfl: 3   escitalopram 20 MG Oral Tab TAKE 1 TABLET (20 MG TOTAL) BY MOUTH DAILY. Disp: 30 tablet Rfl: 11   acetaminophen 325 MG Oral Tab Take by mouth.  Disp:  Rfl: Comment: HAND SURGERY   2004: OTHER SURGICAL HISTORY      Comment: D&C, TUBALLIGATION   No date: PORT, INDWELLING, IMP  2008: RADIATION RIGHT  2010: TOTAL ABDOMINAL HYSTERECTOMY      Comment: BSO    Social History  Social History   Marital status: [employer] Pulmonary/Chest: Effort normal and breath sounds normal. No respiratory distress. She has no wheezes. She exhibits no tenderness. Right breast exhibits no inverted nipple, no mass, no nipple discharge, no skin change and no tenderness.  Left breast exhibits Patient does not have recurrent symptoms of her right ypTis N0 M0 urothelial carcinoma following dose dense MVAC and right nephro ureterectomy. Her CT scan 9/13/2017 is stable without acute inflammatory or metastatic process.   Her chest x-ray was also neg CREATININE Latest Ref Range: 0.50 - 1.50 mg/dL 1.12 1.01    CALCIUM Latest Ref Range: 8.5 - 10.5 mg/dL 9.1 9.2    BUN/CREA RATIO Latest Ref Range: 10.0 - 20.0  16.1 14.9    GFR, Non-African American Latest Ref Range: >=60  50 (L) 56 (L)    GFR, -Donita URINE-COLOR Latest Ref Range: Yellow    Yellow   CLARITY URINE Latest Ref Range: Clear    Clear   SPECIFIC GRAVITY Latest Ref Range: 1.002 - 1.035    1.012   GLUCOSE (URINE DIPSTICK) Latest Ref Range: Negative mg/dL   Negative   BILIRUBIN Latest Ref Range: chemotherapy and tamoxifen (until 2010).   Now on arimidex. Patient also has a history right urothelial carcinoma diagnosed in 2013.  History of benign left breast biopsy.  Family history of breast malignancy in 2 maternal cousins.  Claudell Schimke 1. Post lumpectomy changes upper outer right breast.      2. No MR evidence of malignancy in either breast.      3.  There is an enhancing right anterior rib located subjacent to the right breast. There appears to be a fracture associated with it.  Correlat nipple retraction. Mammotome clip anterior left breast upper-outer quadrant, stable.  A few scattered benign    calcifications are present.      No significant change has occurred.                   Dictated by (CST): Jensen Rogers MD on 2/08/2017 at 10:5 3.  In the mid sigmoid colon away from the previous polypectomy site was a 5 mm sessile polyp which was cold snare excised and retrieved via suction.     Inspection of all the above sites revealed no evidence of ongoing bleeding.  There were scattered diver hip was performed on a [employer] dual energy x-ray               absorptiometry scanner.   FINDINGS:     LEFT FEMORAL NECK       BMD:  0.603 gm/sq. cm.    T SCORE: -2.2      Change since previous:  2% decrease     PA LUMBAR SPINE (L1 - L4)     B. Ascending colon polyps:     *  Fragments of tubular adenoma.       C. Hepatic flexure colon polyp:     *  Hyperplastic polyp with submucosal lipoma.       D.  Sigmoid colon polyp:     *  Multiple fragments of tubulovillous adenoma.           TISSUES

## 2018-03-19 ENCOUNTER — NURSE ONLY (OUTPATIENT)
Dept: HEMATOLOGY/ONCOLOGY | Facility: HOSPITAL | Age: 58
End: 2018-03-19
Attending: INTERNAL MEDICINE
Payer: COMMERCIAL

## 2018-03-19 DIAGNOSIS — Z45.2 ENCOUNTER FOR ADJUSTMENT OR MANAGEMENT OF VASCULAR ACCESS DEVICE: Primary | ICD-10-CM

## 2018-03-19 DIAGNOSIS — C50.811 MALIGNANT NEOPLASM OF OVERLAPPING SITES OF RIGHT BREAST IN FEMALE, ESTROGEN RECEPTOR POSITIVE (HCC): ICD-10-CM

## 2018-03-19 DIAGNOSIS — Z17.0 MALIGNANT NEOPLASM OF OVERLAPPING SITES OF RIGHT BREAST IN FEMALE, ESTROGEN RECEPTOR POSITIVE (HCC): ICD-10-CM

## 2018-03-19 DIAGNOSIS — C68.9 UROTHELIAL CARCINOMA (HCC): ICD-10-CM

## 2018-03-19 LAB
ALBUMIN SERPL BCP-MCNC: 3.9 G/DL (ref 3.5–4.8)
ALBUMIN/GLOB SERPL: 1.6 {RATIO} (ref 1–2)
ALP SERPL-CCNC: 60 U/L (ref 32–100)
ALT SERPL-CCNC: 11 U/L (ref 14–54)
ANION GAP SERPL CALC-SCNC: 6 MMOL/L (ref 0–18)
AST SERPL-CCNC: 22 U/L (ref 15–41)
BASOPHILS # BLD: 0 K/UL (ref 0–0.2)
BASOPHILS NFR BLD: 1 %
BILIRUB SERPL-MCNC: 0.6 MG/DL (ref 0.3–1.2)
BUN SERPL-MCNC: 17 MG/DL (ref 8–20)
BUN/CREAT SERPL: 16 (ref 10–20)
CALCIUM SERPL-MCNC: 9.3 MG/DL (ref 8.5–10.5)
CHLORIDE SERPL-SCNC: 105 MMOL/L (ref 95–110)
CO2 SERPL-SCNC: 28 MMOL/L (ref 22–32)
CREAT SERPL-MCNC: 1.06 MG/DL (ref 0.5–1.5)
EOSINOPHIL # BLD: 0.1 K/UL (ref 0–0.7)
EOSINOPHIL NFR BLD: 2 %
ERYTHROCYTE [DISTWIDTH] IN BLOOD BY AUTOMATED COUNT: 13.7 % (ref 11–15)
GLOBULIN PLAS-MCNC: 2.5 G/DL (ref 2.5–3.7)
GLUCOSE SERPL-MCNC: 133 MG/DL (ref 70–99)
HCT VFR BLD AUTO: 36.7 % (ref 35–48)
HGB BLD-MCNC: 12.1 G/DL (ref 12–16)
LYMPHOCYTES # BLD: 1.1 K/UL (ref 1–4)
LYMPHOCYTES NFR BLD: 26 %
MCH RBC QN AUTO: 30 PG (ref 27–32)
MCHC RBC AUTO-ENTMCNC: 33.1 G/DL (ref 32–37)
MCV RBC AUTO: 90.7 FL (ref 80–100)
MONOCYTES # BLD: 0.2 K/UL (ref 0–1)
MONOCYTES NFR BLD: 5 %
NEUTROPHILS # BLD AUTO: 2.9 K/UL (ref 1.8–7.7)
NEUTROPHILS NFR BLD: 66 %
OSMOLALITY UR CALC.SUM OF ELEC: 291 MOSM/KG (ref 275–295)
PATIENT FASTING: NO
PLATELET # BLD AUTO: 191 K/UL (ref 140–400)
PMV BLD AUTO: 8.4 FL (ref 7.4–10.3)
POTASSIUM SERPL-SCNC: 4.3 MMOL/L (ref 3.3–5.1)
PROT SERPL-MCNC: 6.4 G/DL (ref 5.9–8.4)
RBC # BLD AUTO: 4.05 M/UL (ref 3.7–5.4)
SODIUM SERPL-SCNC: 139 MMOL/L (ref 136–144)
WBC # BLD AUTO: 4.4 K/UL (ref 4–11)

## 2018-03-19 PROCEDURE — A4216 STERILE WATER/SALINE, 10 ML: HCPCS

## 2018-03-19 PROCEDURE — 85025 COMPLETE CBC W/AUTO DIFF WBC: CPT

## 2018-03-19 PROCEDURE — 80053 COMPREHEN METABOLIC PANEL: CPT

## 2018-03-19 PROCEDURE — 36591 DRAW BLOOD OFF VENOUS DEVICE: CPT

## 2018-03-19 RX ORDER — HEPARIN SODIUM (PORCINE) LOCK FLUSH IV SOLN 100 UNIT/ML 100 UNIT/ML
SOLUTION INTRAVENOUS
Status: DISCONTINUED
Start: 2018-03-19 | End: 2018-03-19

## 2018-03-19 RX ORDER — 0.9 % SODIUM CHLORIDE 0.9 %
VIAL (ML) INJECTION
Status: DISCONTINUED
Start: 2018-03-19 | End: 2018-03-19

## 2018-03-19 RX ORDER — HEPARIN SODIUM (PORCINE) LOCK FLUSH IV SOLN 100 UNIT/ML 100 UNIT/ML
5 SOLUTION INTRAVENOUS ONCE
Status: CANCELLED | OUTPATIENT
Start: 2018-03-19

## 2018-03-19 RX ORDER — 0.9 % SODIUM CHLORIDE 0.9 %
10 VIAL (ML) INJECTION ONCE
Status: CANCELLED | OUTPATIENT
Start: 2018-03-19

## 2018-03-19 RX ORDER — HEPARIN SODIUM (PORCINE) LOCK FLUSH IV SOLN 100 UNIT/ML 100 UNIT/ML
5 SOLUTION INTRAVENOUS ONCE
Status: COMPLETED | OUTPATIENT
Start: 2018-03-19 | End: 2018-03-19

## 2018-03-19 RX ADMIN — HEPARIN SODIUM (PORCINE) LOCK FLUSH IV SOLN 100 UNIT/ML 500 UNITS: 100 SOLUTION INTRAVENOUS at 12:45:00

## 2018-03-19 NOTE — PROGRESS NOTES
Marcelle Gutierrez is here for port flush and labs; gets her port flushed about every 3 months, she said. Port accessed per sterile technique. Easy blood return, flushes well. Labs drawn, flushed with NS and heparin.   De-accessed, 2x2 gauze and paper tape to si

## 2018-06-13 ENCOUNTER — OFFICE VISIT (OUTPATIENT)
Dept: HEMATOLOGY/ONCOLOGY | Facility: HOSPITAL | Age: 58
End: 2018-06-13
Attending: INTERNAL MEDICINE
Payer: COMMERCIAL

## 2018-06-13 VITALS
HEIGHT: 65.5 IN | HEART RATE: 74 BPM | RESPIRATION RATE: 18 BRPM | SYSTOLIC BLOOD PRESSURE: 118 MMHG | TEMPERATURE: 98 F | BODY MASS INDEX: 22.39 KG/M2 | DIASTOLIC BLOOD PRESSURE: 76 MMHG | WEIGHT: 136 LBS

## 2018-06-13 DIAGNOSIS — Z17.0 MALIGNANT NEOPLASM OF OVERLAPPING SITES OF RIGHT BREAST IN FEMALE, ESTROGEN RECEPTOR POSITIVE (HCC): ICD-10-CM

## 2018-06-13 DIAGNOSIS — K63.5 MULTIPLE POLYPS OF SIGMOID COLON: ICD-10-CM

## 2018-06-13 DIAGNOSIS — C50.811 MALIGNANT NEOPLASM OF OVERLAPPING SITES OF RIGHT BREAST IN FEMALE, ESTROGEN RECEPTOR POSITIVE (HCC): ICD-10-CM

## 2018-06-13 DIAGNOSIS — Z79.811 ENCOUNTER FOR MONITORING AROMATASE INHIBITOR THERAPY: ICD-10-CM

## 2018-06-13 DIAGNOSIS — M85.80 OSTEOPENIA DETERMINED BY X-RAY: ICD-10-CM

## 2018-06-13 DIAGNOSIS — C68.9 UROTHELIAL CARCINOMA (HCC): ICD-10-CM

## 2018-06-13 DIAGNOSIS — Z17.0 MALIGNANT NEOPLASM OF OVERLAPPING SITES OF RIGHT BREAST IN FEMALE, ESTROGEN RECEPTOR POSITIVE (HCC): Primary | ICD-10-CM

## 2018-06-13 DIAGNOSIS — Z45.2 ENCOUNTER FOR ADJUSTMENT OR MANAGEMENT OF VASCULAR ACCESS DEVICE: Primary | ICD-10-CM

## 2018-06-13 DIAGNOSIS — Z51.81 ENCOUNTER FOR MONITORING AROMATASE INHIBITOR THERAPY: ICD-10-CM

## 2018-06-13 DIAGNOSIS — C50.811 MALIGNANT NEOPLASM OF OVERLAPPING SITES OF RIGHT BREAST IN FEMALE, ESTROGEN RECEPTOR POSITIVE (HCC): Primary | ICD-10-CM

## 2018-06-13 PROCEDURE — 99214 OFFICE O/P EST MOD 30 MIN: CPT | Performed by: INTERNAL MEDICINE

## 2018-06-13 PROCEDURE — 36591 DRAW BLOOD OFF VENOUS DEVICE: CPT

## 2018-06-13 PROCEDURE — 80053 COMPREHEN METABOLIC PANEL: CPT

## 2018-06-13 PROCEDURE — A4216 STERILE WATER/SALINE, 10 ML: HCPCS

## 2018-06-13 PROCEDURE — 85025 COMPLETE CBC W/AUTO DIFF WBC: CPT

## 2018-06-13 RX ORDER — HEPARIN SODIUM (PORCINE) LOCK FLUSH IV SOLN 100 UNIT/ML 100 UNIT/ML
5 SOLUTION INTRAVENOUS ONCE
Status: CANCELLED | OUTPATIENT
Start: 2018-06-13

## 2018-06-13 RX ORDER — HEPARIN SODIUM (PORCINE) LOCK FLUSH IV SOLN 100 UNIT/ML 100 UNIT/ML
SOLUTION INTRAVENOUS
Status: COMPLETED
Start: 2018-06-13 | End: 2018-06-13

## 2018-06-13 RX ORDER — 0.9 % SODIUM CHLORIDE 0.9 %
VIAL (ML) INJECTION
Status: DISCONTINUED
Start: 2018-06-13 | End: 2018-06-13

## 2018-06-13 RX ORDER — HEPARIN SODIUM (PORCINE) LOCK FLUSH IV SOLN 100 UNIT/ML 100 UNIT/ML
5 SOLUTION INTRAVENOUS ONCE
Status: COMPLETED | OUTPATIENT
Start: 2018-06-13 | End: 2018-06-13

## 2018-06-13 RX ORDER — 0.9 % SODIUM CHLORIDE 0.9 %
10 VIAL (ML) INJECTION ONCE
Status: CANCELLED | OUTPATIENT
Start: 2018-06-13

## 2018-06-13 RX ADMIN — HEPARIN SODIUM (PORCINE) LOCK FLUSH IV SOLN 100 UNIT/ML 500 UNITS: 100 SOLUTION INTRAVENOUS at 15:01:00

## 2018-06-29 ENCOUNTER — HOSPITAL ENCOUNTER (OUTPATIENT)
Dept: BONE DENSITY | Facility: HOSPITAL | Age: 58
Discharge: HOME | End: 2018-06-29
Attending: INTERNAL MEDICINE
Payer: COMMERCIAL

## 2018-06-29 DIAGNOSIS — M85.80 OSTEOPENIA DETERMINED BY X-RAY: ICD-10-CM

## 2018-06-29 PROCEDURE — 77080 DXA BONE DENSITY AXIAL: CPT | Performed by: INTERNAL MEDICINE

## 2018-07-09 NOTE — PROGRESS NOTES
BRYANT Israel is a 60year old female who returns for follow-up of her urothelial carcinoma and breast cancer. She will have repeat CT scans of the abdomen and pelvis and chest x-ray at [facility] 9/12/2018 and be seen by Dr. Vinh Gonzalez in follow-up. RIGHT NEPHROURETERECTOMY at [facility] by Dr. Tyshawn Bower:  Rt retroperitoneal and pelvis lymph nodes; dissection:  11 lymph nodes, no tumor.   Rt kidney, ureter, stent and bladder cuff; nephroureterectomy:  All margins of resection, free of tumor Calcium Carbonate-Vitamin D (CALCIUM + D OR) Take 1 tablet by mouth daily. Disp:  Rfl:    ibuprofen (ADVIL) 200 MG Oral Tab Take 200 mg by mouth every 6 (six) hours as needed for Pain.  Disp:  Rfl:      Allergies:   No Known Allergies    Past Medical Histor Comment: BSO    Social History  Social History   Marital status:   Spouse name: N/A    Years of education: N/A  Number of children: N/A     Occupational History  None on file     Social History Main Topics   Smoking status: Former Smoker     Smo Pulmonary/Chest: Effort normal and breath sounds normal. No respiratory distress. She has no wheezes. She exhibits no tenderness. Right breast exhibits no inverted nipple, no mass, no nipple discharge, no skin change and no tenderness.  Left breast exhibits Patient does not have evidence of recurrence of her hormone receptor positive, low risk Oncotype Dx pT1b N1 M0 infiltrating ductal right breast cancer. She is continuing anastrozole therapy. She is continuing her exercise program and healthy diet.   She h CALCULATED OSMOLALITY Latest Ref Range: 275 - 295 mOsm/kg 290 288    ALKALINE PHOSPHATASE Latest Ref Range: 32 - 100 U/L 56 59    AST (SGOT) Latest Ref Range: 15 - 41 U/L 18 19    ALT (SGPT) Latest Ref Range: 14 - 54 U/L 11 (L) 13 (L)    Total Bilirubin La OCCULT BLOOD Latest Ref Range: Negative    Small (A)   PH, URINE Latest Ref Range: 5.0 - 8.0    5.0   PROTEIN (URINE DIPSTICK) Latest Ref Range: Negative mg/dL   Negative   UROBILINOGEN,SEMI-QN Latest Ref Range: <2.0    <2.0   NITRITE, URINE Latest Ref Ran ENHANCEMENT PATTERN:   There is heterogenous tissue.  Background parenchymal enhancement is minimal, with <25% of glandular tissue demonstrating enhancement.      KEY: (#/#) = (Series/Image number)  All measurements, unless otherwise specified, are listed this is could be pathologic fracture and to see if there are any other sites concerning for metastasis.  A bone scan would not differentiate between a fracture and lesion.   The right rib finding was discussed with Dr. Sarbjit Crane.      BI-RADS CATEGORY:      BI-RADS CATEGORY:       DIAGNOSTIC CATEGORY 2--BENIGN FINDING:     Postsurgical/treatment changes right breast, stable.  No radiographic evidence of malignancy      RECOMMENDATIONS:    ANNUAL MAMMOGRAM AND CLINICAL        Technique:  12/6/16  After informed Inspection of all the above sites revealed no evidence of ongoing bleeding.  There were scattered diverticula seen throughout the colon without current complication.  There were no other colonic polyps, mass lesions, vascular anomalies or signs of inflamma 2% decrease     PA LUMBAR SPINE (L1 - L4)       BMD:  0.815 gm/sq. cm.    T SCORE: -2.1       Change since previous:  10.8% increase          T scores are a comparison to sex-matched patients with mean peak bone  mass and are given in standard deviation (s   *  Multiple fragments of tubulovillous adenoma.           TISSUES SUBMITTED                   A. Polyp, cecum   B. Polyp, ascending colon   C. Polyp, hepatic flexure colon   D.  Polyp, sigmoid colon       Meds This Visit:  see list

## 2018-09-01 ENCOUNTER — HOSPITAL (OUTPATIENT)
Dept: OTHER | Age: 58
End: 2018-09-01
Attending: EMERGENCY MEDICINE

## 2018-09-10 PROBLEM — M84.374A FRACTURE, STRESS, METATARSAL, RIGHT, INITIAL ENCOUNTER: Status: ACTIVE | Noted: 2018-09-10

## 2018-09-11 ENCOUNTER — NURSE ONLY (OUTPATIENT)
Dept: HEMATOLOGY/ONCOLOGY | Facility: HOSPITAL | Age: 58
End: 2018-09-11
Attending: INTERNAL MEDICINE
Payer: COMMERCIAL

## 2018-09-11 DIAGNOSIS — Z45.2 ENCOUNTER FOR ADJUSTMENT OR MANAGEMENT OF VASCULAR ACCESS DEVICE: Primary | ICD-10-CM

## 2018-09-11 PROCEDURE — A4216 STERILE WATER/SALINE, 10 ML: HCPCS

## 2018-09-11 PROCEDURE — 36591 DRAW BLOOD OFF VENOUS DEVICE: CPT

## 2018-09-11 PROCEDURE — 96523 IRRIG DRUG DELIVERY DEVICE: CPT

## 2018-09-11 RX ORDER — 0.9 % SODIUM CHLORIDE 0.9 %
VIAL (ML) INJECTION
Status: DISCONTINUED
Start: 2018-09-11 | End: 2018-09-11

## 2018-09-11 RX ORDER — 0.9 % SODIUM CHLORIDE 0.9 %
10 VIAL (ML) INJECTION ONCE
Status: CANCELLED | OUTPATIENT
Start: 2018-09-11

## 2018-09-11 RX ORDER — HEPARIN SODIUM (PORCINE) LOCK FLUSH IV SOLN 100 UNIT/ML 100 UNIT/ML
SOLUTION INTRAVENOUS
Status: COMPLETED
Start: 2018-09-11 | End: 2018-09-11

## 2018-09-11 RX ORDER — HEPARIN SODIUM (PORCINE) LOCK FLUSH IV SOLN 100 UNIT/ML 100 UNIT/ML
5 SOLUTION INTRAVENOUS ONCE
Status: COMPLETED | OUTPATIENT
Start: 2018-09-11 | End: 2018-09-11

## 2018-09-11 RX ORDER — HEPARIN SODIUM (PORCINE) LOCK FLUSH IV SOLN 100 UNIT/ML 100 UNIT/ML
5 SOLUTION INTRAVENOUS ONCE
Status: CANCELLED | OUTPATIENT
Start: 2018-09-11

## 2018-09-11 RX ADMIN — HEPARIN SODIUM (PORCINE) LOCK FLUSH IV SOLN 100 UNIT/ML 500 UNITS: 100 SOLUTION INTRAVENOUS at 10:10:00

## 2018-09-11 NOTE — PROGRESS NOTES
Pt to lab for routine port flush. Pt offers no new complaints today. Port accessed and flushed with good blood return. Pt tolerated port flush well. Port deaccessed per protocol. Site covered with gauze and paper tape.   Pt discharged stable and ambula

## 2018-11-03 ENCOUNTER — HOSPITAL (OUTPATIENT)
Dept: OTHER | Age: 58
End: 2018-11-03
Attending: EMERGENCY MEDICINE

## 2018-11-05 ENCOUNTER — TELEPHONE (OUTPATIENT)
Dept: HEMATOLOGY/ONCOLOGY | Facility: HOSPITAL | Age: 58
End: 2018-11-05

## 2018-11-05 NOTE — TELEPHONE ENCOUNTER
Mary Ann Jimenez would like to speak with Dr Zenon Rajan. Mary Ann Jimenez stated, she broke her humerus over the weekend. She would like to get any recommendations to see a specialist. Mary Ann Jimenez can be reached at [phone] Please Advise.

## 2018-11-06 PROBLEM — S42.331A CLOSED DISPLACED OBLIQUE FRACTURE OF SHAFT OF RIGHT HUMERUS: Status: ACTIVE | Noted: 2018-11-06

## 2018-11-06 NOTE — TELEPHONE ENCOUNTER
Spoke with Amira Donohue -- she saw Dr. Kanwal Thomas today and is set up with plan, appreciates call back.

## 2018-11-08 ENCOUNTER — TELEPHONE (OUTPATIENT)
Dept: HEMATOLOGY/ONCOLOGY | Facility: HOSPITAL | Age: 58
End: 2018-11-08

## 2018-11-08 NOTE — TELEPHONE ENCOUNTER
Spoke with Oneida Rebollar-- states she is not sure which MD she will be able to use and will speak with occupational health to make sure she is doing the right thing.  She would like Dr. Kati Laws recommendation on ortho surgeon: Jonathan Oneil MD

## 2018-11-08 NOTE — TELEPHONE ENCOUNTER
I spoke with Germaine Kearns. Germaine Kearns stated, she spoke with the nurse for an recommendation for an orthopedic. Germaine Kearns said, she had the name of the specialist however, she can't remember what she wrote it down on.  She apologizes, however she would like to spe

## 2018-11-09 ENCOUNTER — HOSPITAL (OUTPATIENT)
Dept: OTHER | Age: 58
End: 2018-11-09
Attending: EMERGENCY MEDICINE

## 2018-11-12 ENCOUNTER — TELEPHONE (OUTPATIENT)
Dept: PHYSICAL THERAPY | Facility: HOSPITAL | Age: 58
End: 2018-11-12

## 2018-11-12 NOTE — TELEPHONE ENCOUNTER
Pt called concerned about hand swelling- pt stating she had fallen and fractured her arm earlier this month, is in a cast and an immobilizer \"I'm afraid it's the lymphedema coming back\".  Instr pt to call the ortho (Dr Wade Wolff) to let him know of the swe

## 2018-11-13 ENCOUNTER — OFFICE VISIT (OUTPATIENT)
Dept: HEMATOLOGY/ONCOLOGY | Facility: HOSPITAL | Age: 58
End: 2018-11-13
Attending: INTERNAL MEDICINE
Payer: COMMERCIAL

## 2018-11-13 VITALS
BODY MASS INDEX: 22.55 KG/M2 | HEART RATE: 69 BPM | RESPIRATION RATE: 16 BRPM | SYSTOLIC BLOOD PRESSURE: 121 MMHG | HEIGHT: 65.5 IN | TEMPERATURE: 99 F | DIASTOLIC BLOOD PRESSURE: 77 MMHG | WEIGHT: 137 LBS

## 2018-11-13 DIAGNOSIS — I89.0 LYMPHEDEMA OF ARM: ICD-10-CM

## 2018-11-13 DIAGNOSIS — S42.331S CLOSED DISPLACED OBLIQUE FRACTURE OF SHAFT OF RIGHT HUMERUS, SEQUELA: ICD-10-CM

## 2018-11-13 DIAGNOSIS — R92.2 DENSE BREASTS: Primary | ICD-10-CM

## 2018-11-13 DIAGNOSIS — C68.9 UROTHELIAL CARCINOMA (HCC): ICD-10-CM

## 2018-11-13 DIAGNOSIS — D12.6 TUBULOVILLOUS ADENOMA OF COLON: ICD-10-CM

## 2018-11-13 DIAGNOSIS — Z51.81 ENCOUNTER FOR MONITORING AROMATASE INHIBITOR THERAPY: ICD-10-CM

## 2018-11-13 DIAGNOSIS — C50.811 MALIGNANT NEOPLASM OF OVERLAPPING SITES OF RIGHT BREAST IN FEMALE, ESTROGEN RECEPTOR POSITIVE (HCC): ICD-10-CM

## 2018-11-13 DIAGNOSIS — Z79.811 ENCOUNTER FOR MONITORING AROMATASE INHIBITOR THERAPY: ICD-10-CM

## 2018-11-13 DIAGNOSIS — M84.374A FRACTURE, STRESS, METATARSAL, RIGHT, INITIAL ENCOUNTER: ICD-10-CM

## 2018-11-13 DIAGNOSIS — K63.5 MULTIPLE POLYPS OF SIGMOID COLON: ICD-10-CM

## 2018-11-13 DIAGNOSIS — M85.80 OSTEOPENIA DETERMINED BY X-RAY: ICD-10-CM

## 2018-11-13 DIAGNOSIS — Z17.0 MALIGNANT NEOPLASM OF OVERLAPPING SITES OF RIGHT BREAST IN FEMALE, ESTROGEN RECEPTOR POSITIVE (HCC): ICD-10-CM

## 2018-11-13 PROCEDURE — 99213 OFFICE O/P EST LOW 20 MIN: CPT | Performed by: INTERNAL MEDICINE

## 2018-11-13 NOTE — PROGRESS NOTES
BRYANT   Hermann Sandifer is a 60year old female who is seen today after she fell at work and sustained a fracture of her right humerus and developed tremendous swelling in her right arm, forearm, and hand.     She has been seen at [address] [place] [address], Dr. Krupa Contreras, and beatriz ypTis N0 Mx         3/21/2014 Surgery     RIGHT NEPHROURETERECTOMY at [facility] by Dr. Haas Mins:  Lulu Carey retroperitoneal and pelvis lymph nodes; dissection:  11 lymph nodes, no tumor.   Rt kidney, ureter, stent and bladder cuff; nephroureterectomy: Calcium Carbonate-Vitamin D (CALCIUM + D OR) Take 1 tablet by mouth daily.  Disp:  Rfl:      Allergies:   No Known Allergies    Past Medical History:   Diagnosis Date   • Breast cancer ([facility] [address] DX 12/14/2007    RIGHT BREAST,    • Cervical dysplasia    • Herpes Socioeconomic History      Marital status:       Spouse name: Not on file      Number of children: Not on file      Years of education: Not on file      Highest education level: Not on file    Social Needs      Financial resource strain: Not on fi /77 (BP Location: Left arm, Patient Position: Sitting, Cuff Size: adult)   Pulse 69   Temp 98.9 °F (37.2 °C) (Oral)   Resp 16   Ht 1.664 m (5' 5.5\")   Wt 62.1 kg (137 lb)   BMI 22.45 kg/m²     Physical Exam   Constitutional: She is oriented to [employer] [facility]: She has a normal mood and affect. Her behavior is normal. Judgment and thought content normal.   Nursing note and vitals reviewed. ASSESSMENT/PLAN:   No diagnosis found.      Patient has concerns of increased lymphedema following the f 2.  Colon polyps  3.  Pancolonic diverticulosis  Recommendations:  1.  Follow-up biopsy results.   2.  Further recommendations pending  Mauricio Pruitt MD  12/6/2016  -------------------------------------------------------------------------------------

## 2018-11-15 ENCOUNTER — TELEPHONE (OUTPATIENT)
Dept: HEMATOLOGY/ONCOLOGY | Facility: HOSPITAL | Age: 58
End: 2018-11-15

## 2018-11-15 NOTE — TELEPHONE ENCOUNTER
[facility] Kajal Villanueva is faxing a consent form for surgery clearance. The surgery is dated for 11/28/2018. Dr. Londono Records fax is [phone].   Dr Londono Records contact number is  [phone] Ext [phone] For further questions Froy Jaquez can be reached at 10

## 2018-11-24 RX ORDER — ACETAMINOPHEN 500 MG
1000 TABLET ORAL EVERY 6 HOURS PRN
Status: ON HOLD | COMMUNITY
End: 2018-11-28

## 2018-11-24 RX ORDER — MULTIVIT-MIN/IRON/FOLIC ACID/K 18-600-40
1 CAPSULE ORAL DAILY
COMMUNITY

## 2018-11-24 RX ORDER — OMEGA-3 FATTY ACIDS/FISH OIL 300-1000MG
400 CAPSULE ORAL DAILY
Status: ON HOLD | COMMUNITY
End: 2018-11-28

## 2018-11-28 ENCOUNTER — HOSPITAL ENCOUNTER (OUTPATIENT)
Facility: HOSPITAL | Age: 58
Setting detail: HOSPITAL OUTPATIENT SURGERY
Discharge: HOME | End: 2018-11-28
Attending: ORTHOPAEDIC SURGERY | Admitting: ORTHOPAEDIC SURGERY
Payer: OTHER MISCELLANEOUS

## 2018-11-28 ENCOUNTER — APPOINTMENT (OUTPATIENT)
Dept: GENERAL RADIOLOGY | Facility: HOSPITAL | Age: 58
End: 2018-11-28
Attending: ORTHOPAEDIC SURGERY
Payer: OTHER MISCELLANEOUS

## 2018-11-28 ENCOUNTER — ANESTHESIA EVENT (OUTPATIENT)
Dept: SURGERY | Facility: HOSPITAL | Age: 58
End: 2018-11-28
Payer: OTHER MISCELLANEOUS

## 2018-11-28 ENCOUNTER — ANESTHESIA (OUTPATIENT)
Dept: SURGERY | Facility: HOSPITAL | Age: 58
End: 2018-11-28
Payer: OTHER MISCELLANEOUS

## 2018-11-28 VITALS
BODY MASS INDEX: 22.72 KG/M2 | WEIGHT: 138 LBS | OXYGEN SATURATION: 95 % | DIASTOLIC BLOOD PRESSURE: 65 MMHG | HEIGHT: 65.5 IN | RESPIRATION RATE: 16 BRPM | TEMPERATURE: 98 F | SYSTOLIC BLOOD PRESSURE: 112 MMHG | HEART RATE: 88 BPM

## 2018-11-28 DIAGNOSIS — S42.331D CLOSED DISPLACED OBLIQUE FRACTURE OF SHAFT OF RIGHT HUMERUS WITH ROUTINE HEALING, SUBSEQUENT ENCOUNTER: Primary | ICD-10-CM

## 2018-11-28 PROCEDURE — 86920 COMPATIBILITY TEST SPIN: CPT

## 2018-11-28 PROCEDURE — 86901 BLOOD TYPING SEROLOGIC RH(D): CPT | Performed by: ANESTHESIOLOGY

## 2018-11-28 PROCEDURE — 3E0T3BZ INTRODUCTION OF ANESTHETIC AGENT INTO PERIPHERAL NERVES AND PLEXI, PERCUTANEOUS APPROACH: ICD-10-PCS | Performed by: ANESTHESIOLOGY

## 2018-11-28 PROCEDURE — 99152 MOD SED SAME PHYS/QHP 5/>YRS: CPT | Performed by: ORTHOPAEDIC SURGERY

## 2018-11-28 PROCEDURE — 64415 NJX AA&/STRD BRCH PLXS IMG: CPT | Performed by: ORTHOPAEDIC SURGERY

## 2018-11-28 PROCEDURE — 0PSF04Z REPOSITION RIGHT HUMERAL SHAFT WITH INTERNAL FIXATION DEVICE, OPEN APPROACH: ICD-10-PCS | Performed by: ORTHOPAEDIC SURGERY

## 2018-11-28 PROCEDURE — 76942 ECHO GUIDE FOR BIOPSY: CPT | Performed by: ORTHOPAEDIC SURGERY

## 2018-11-28 PROCEDURE — 86850 RBC ANTIBODY SCREEN: CPT | Performed by: ANESTHESIOLOGY

## 2018-11-28 PROCEDURE — 86900 BLOOD TYPING SEROLOGIC ABO: CPT | Performed by: ANESTHESIOLOGY

## 2018-11-28 PROCEDURE — 76001 XR C-ARM FLUORO >1 HOUR  (CPT=76001): CPT | Performed by: ORTHOPAEDIC SURGERY

## 2018-11-28 DEVICE — IMPLANTABLE DEVICE: Type: IMPLANTABLE DEVICE | Site: HUMERUS | Status: FUNCTIONAL

## 2018-11-28 DEVICE — SCREW BN 2.7MM 26MM DCP SS ST: Type: IMPLANTABLE DEVICE | Site: HUMERUS | Status: FUNCTIONAL

## 2018-11-28 DEVICE — SCREW CRTX 2.7X28MM ST: Type: IMPLANTABLE DEVICE | Site: HUMERUS | Status: FUNCTIONAL

## 2018-11-28 RX ORDER — MIDAZOLAM HYDROCHLORIDE 1 MG/ML
INJECTION INTRAMUSCULAR; INTRAVENOUS AS NEEDED
Status: DISCONTINUED | OUTPATIENT
Start: 2018-11-28 | End: 2018-11-28 | Stop reason: SURG

## 2018-11-28 RX ORDER — CEFAZOLIN SODIUM/WATER 2 G/20 ML
SYRINGE (ML) INTRAVENOUS AS NEEDED
Status: DISCONTINUED | OUTPATIENT
Start: 2018-11-28 | End: 2018-11-28 | Stop reason: SURG

## 2018-11-28 RX ORDER — SODIUM CHLORIDE, SODIUM LACTATE, POTASSIUM CHLORIDE, CALCIUM CHLORIDE 600; 310; 30; 20 MG/100ML; MG/100ML; MG/100ML; MG/100ML
INJECTION, SOLUTION INTRAVENOUS CONTINUOUS
Status: DISCONTINUED | OUTPATIENT
Start: 2018-11-28 | End: 2018-11-28

## 2018-11-28 RX ORDER — HYDROCODONE BITARTRATE AND ACETAMINOPHEN 5; 325 MG/1; MG/1
2 TABLET ORAL AS NEEDED
Status: DISCONTINUED | OUTPATIENT
Start: 2018-11-28 | End: 2018-11-28

## 2018-11-28 RX ORDER — FAMOTIDINE 20 MG/1
20 TABLET ORAL ONCE
Status: DISCONTINUED | OUTPATIENT
Start: 2018-11-28 | End: 2018-11-28 | Stop reason: HOSPADM

## 2018-11-28 RX ORDER — MORPHINE SULFATE 4 MG/ML
2 INJECTION, SOLUTION INTRAMUSCULAR; INTRAVENOUS EVERY 10 MIN PRN
Status: DISCONTINUED | OUTPATIENT
Start: 2018-11-28 | End: 2018-11-28

## 2018-11-28 RX ORDER — NALOXONE HYDROCHLORIDE 0.4 MG/ML
80 INJECTION, SOLUTION INTRAMUSCULAR; INTRAVENOUS; SUBCUTANEOUS AS NEEDED
Status: DISCONTINUED | OUTPATIENT
Start: 2018-11-28 | End: 2018-11-28

## 2018-11-28 RX ORDER — ONDANSETRON 2 MG/ML
INJECTION INTRAMUSCULAR; INTRAVENOUS AS NEEDED
Status: DISCONTINUED | OUTPATIENT
Start: 2018-11-28 | End: 2018-11-28 | Stop reason: SURG

## 2018-11-28 RX ORDER — HYDROCODONE BITARTRATE AND ACETAMINOPHEN 7.5; 325 MG/1; MG/1
1 TABLET ORAL EVERY 4 HOURS PRN
Qty: 30 TABLET | Refills: 0 | Status: SHIPPED | OUTPATIENT
Start: 2018-11-28 | End: 2019-03-11 | Stop reason: ALTCHOICE

## 2018-11-28 RX ORDER — ROCURONIUM BROMIDE 10 MG/ML
INJECTION, SOLUTION INTRAVENOUS AS NEEDED
Status: DISCONTINUED | OUTPATIENT
Start: 2018-11-28 | End: 2018-11-28 | Stop reason: SURG

## 2018-11-28 RX ORDER — GLYCOPYRROLATE 0.2 MG/ML
INJECTION INTRAMUSCULAR; INTRAVENOUS AS NEEDED
Status: DISCONTINUED | OUTPATIENT
Start: 2018-11-28 | End: 2018-11-28 | Stop reason: SURG

## 2018-11-28 RX ORDER — ACETAMINOPHEN 500 MG
1000 TABLET ORAL ONCE
Status: COMPLETED | OUTPATIENT
Start: 2018-11-28 | End: 2018-11-28

## 2018-11-28 RX ORDER — NEOSTIGMINE METHYLSULFATE 0.5 MG/ML
INJECTION INTRAVENOUS AS NEEDED
Status: DISCONTINUED | OUTPATIENT
Start: 2018-11-28 | End: 2018-11-28 | Stop reason: SURG

## 2018-11-28 RX ORDER — CEPHALEXIN 500 MG/1
500 CAPSULE ORAL 4 TIMES DAILY
Qty: 12 CAPSULE | Refills: 0 | Status: SHIPPED | OUTPATIENT
Start: 2018-11-28 | End: 2018-12-01

## 2018-11-28 RX ORDER — ONDANSETRON 4 MG/1
4 TABLET, FILM COATED ORAL EVERY 12 HOURS PRN
Qty: 10 TABLET | Refills: 0 | Status: SHIPPED | OUTPATIENT
Start: 2018-11-28 | End: 2019-03-11 | Stop reason: ALTCHOICE

## 2018-11-28 RX ORDER — DEXAMETHASONE SODIUM PHOSPHATE 4 MG/ML
VIAL (ML) INJECTION AS NEEDED
Status: DISCONTINUED | OUTPATIENT
Start: 2018-11-28 | End: 2018-11-28 | Stop reason: SURG

## 2018-11-28 RX ORDER — EPHEDRINE SULFATE 50 MG/ML
INJECTION, SOLUTION INTRAVENOUS AS NEEDED
Status: DISCONTINUED | OUTPATIENT
Start: 2018-11-28 | End: 2018-11-28 | Stop reason: SURG

## 2018-11-28 RX ORDER — HALOPERIDOL 5 MG/ML
0.25 INJECTION INTRAMUSCULAR ONCE AS NEEDED
Status: DISCONTINUED | OUTPATIENT
Start: 2018-11-28 | End: 2018-11-28

## 2018-11-28 RX ORDER — CEFAZOLIN SODIUM/WATER 2 G/20 ML
2 SYRINGE (ML) INTRAVENOUS ONCE
Status: DISCONTINUED | OUTPATIENT
Start: 2018-11-28 | End: 2018-11-28 | Stop reason: HOSPADM

## 2018-11-28 RX ORDER — HYDROCODONE BITARTRATE AND ACETAMINOPHEN 5; 325 MG/1; MG/1
1 TABLET ORAL AS NEEDED
Status: DISCONTINUED | OUTPATIENT
Start: 2018-11-28 | End: 2018-11-28

## 2018-11-28 RX ORDER — MORPHINE SULFATE 10 MG/ML
6 INJECTION, SOLUTION INTRAMUSCULAR; INTRAVENOUS EVERY 10 MIN PRN
Status: DISCONTINUED | OUTPATIENT
Start: 2018-11-28 | End: 2018-11-28

## 2018-11-28 RX ORDER — ONDANSETRON 2 MG/ML
4 INJECTION INTRAMUSCULAR; INTRAVENOUS ONCE AS NEEDED
Status: DISCONTINUED | OUTPATIENT
Start: 2018-11-28 | End: 2018-11-28

## 2018-11-28 RX ORDER — ROPIVACAINE HYDROCHLORIDE 5 MG/ML
INJECTION, SOLUTION EPIDURAL; INFILTRATION; PERINEURAL AS NEEDED
Status: DISCONTINUED | OUTPATIENT
Start: 2018-11-28 | End: 2018-11-28 | Stop reason: SURG

## 2018-11-28 RX ORDER — MORPHINE SULFATE 4 MG/ML
4 INJECTION, SOLUTION INTRAMUSCULAR; INTRAVENOUS EVERY 10 MIN PRN
Status: DISCONTINUED | OUTPATIENT
Start: 2018-11-28 | End: 2018-11-28

## 2018-11-28 RX ADMIN — ONDANSETRON 4 MG: 2 INJECTION INTRAMUSCULAR; INTRAVENOUS at 10:00:00

## 2018-11-28 RX ADMIN — MIDAZOLAM HYDROCHLORIDE 2 MG: 1 INJECTION INTRAMUSCULAR; INTRAVENOUS at 06:59:00

## 2018-11-28 RX ADMIN — GLYCOPYRROLATE 0.6 MG: 0.2 INJECTION INTRAMUSCULAR; INTRAVENOUS at 10:25:00

## 2018-11-28 RX ADMIN — CEFAZOLIN SODIUM/WATER 2 G: 2 G/20 ML SYRINGE (ML) INTRAVENOUS at 07:57:00

## 2018-11-28 RX ADMIN — MIDAZOLAM HYDROCHLORIDE 2 MG: 1 INJECTION INTRAMUSCULAR; INTRAVENOUS at 07:46:00

## 2018-11-28 RX ADMIN — ROCURONIUM BROMIDE 50 MG: 10 INJECTION, SOLUTION INTRAVENOUS at 07:46:00

## 2018-11-28 RX ADMIN — NEOSTIGMINE METHYLSULFATE 5 MG: 0.5 INJECTION INTRAVENOUS at 10:25:00

## 2018-11-28 RX ADMIN — EPHEDRINE SULFATE 10 MG: 50 INJECTION, SOLUTION INTRAVENOUS at 09:35:00

## 2018-11-28 RX ADMIN — EPHEDRINE SULFATE 10 MG: 50 INJECTION, SOLUTION INTRAVENOUS at 09:43:00

## 2018-11-28 RX ADMIN — ROCURONIUM BROMIDE 20 MG: 10 INJECTION, SOLUTION INTRAVENOUS at 08:40:00

## 2018-11-28 RX ADMIN — SODIUM CHLORIDE, SODIUM LACTATE, POTASSIUM CHLORIDE, CALCIUM CHLORIDE: 600; 310; 30; 20 INJECTION, SOLUTION INTRAVENOUS at 08:40:00

## 2018-11-28 RX ADMIN — ROPIVACAINE HYDROCHLORIDE 30 ML: 5 INJECTION, SOLUTION EPIDURAL; INFILTRATION; PERINEURAL at 06:59:00

## 2018-11-28 RX ADMIN — SODIUM CHLORIDE, SODIUM LACTATE, POTASSIUM CHLORIDE, CALCIUM CHLORIDE: 600; 310; 30; 20 INJECTION, SOLUTION INTRAVENOUS at 07:45:00

## 2018-11-28 RX ADMIN — DEXAMETHASONE SODIUM PHOSPHATE 4 MG: 4 MG/ML VIAL (ML) INJECTION at 10:00:00

## 2018-11-28 NOTE — ANESTHESIA PROCEDURE NOTES
Peripheral Block    Anesthesiologist:  Hernesto Kirk MD  Performed by:   Anesthesiologist  Patient Location:  PACU  Start Time:  11/28/2018 6:58 AM  End Time:  11/28/2018 7:00 AM  Site Identification: ultrasound guided, real time ultrasound guided and

## 2018-11-28 NOTE — INTERVAL H&P NOTE
Pre-op Diagnosis: Right humerus fracture    The above referenced H&P was reviewed by Seble Novoa MD on 11/28/2018, the patient was examined and no significant changes have occurred in the patient's condition since the H&P was performed.   I discussed wi

## 2018-11-28 NOTE — ANESTHESIA POSTPROCEDURE EVALUATION
Patient: Carina Nguyen    Procedure Summary     Date:  11/28/18 Room / Location:  [facility] OR  / [facility] OR    Anesthesia Start:  0740 Anesthesia Stop:      Procedure:  HUMERUS OPEN REDUCTION INTERNAL FIXATION/ PINNING (Right Upper Arm) Diagnosis:  (Righ

## 2018-11-28 NOTE — ANESTHESIA PROCEDURE NOTES
ANESTHESIA INTUBATION  Date/Time: 11/28/2018 7:50 AM  Urgency: elective    Airway not difficult    General Information and Staff    Patient location during procedure: OR  Anesthesiologist: Natasha Ahn MD  Performed: anesthesiologist     Indications

## 2018-11-28 NOTE — ANESTHESIA PREPROCEDURE EVALUATION
Anesthesia PreOp Note    HPI:     Vipin Armenta is a 60year old female who presents for preoperative consultation requested by:  Rebecca Person MD    Date of Surgery: 11/28/2018    Procedure(s):  HUMERUS OPEN REDUCTION INTERNAL FIXATION/ PINNING  Indicati screening 01/16/2014    DEXA SCAN:  The patient's bone density is within the osteoporotic range.  05/28/2014:   • Other specified disorders of uterus, not elsewhere classified     endometrial abnormality   • Pregnancy 1990, 1991, 1992    x 3 vag vacuum, sp Medications Ordered in Epic:  lactated ringers infusion  Intravenous Continuous Nicole Raza MD Last Rate: 20 mL/hr at 11/28/18 0612   famoTIDine (PEPCID) tab 20 mg 20 mg Oral Once Nicole Raza MD    ceFAZolin sodium (ANCEF/KEFZOL) 2 GM/20ML premix Relation Age of Onset   • Heart Disorder Father    • Gastro-Intestinal Disorder Mother         diverticulitis   • Genito-Urinary Disorder Sister         urolithiasis   • Diabetes Sister    • Cancer Other         family h/o lymphoma   • Breast Cancer Cousin oral temperature is 97.9 °F (36.6 °C). Her blood pressure is 129/78 and her pulse is 61. Her respiration is 15 and oxygen saturation is 97%.     11/24/18  0811 11/28/18  0615 11/28/18  0639 11/28/18  0649   BP:  116/72 127/80 129/78   BP Location:  Right ar

## 2018-11-28 NOTE — BRIEF OP NOTE
Pre-Operative Diagnosis: Right humerus fracture     Post-Operative Diagnosis: Right humerus fracture      Procedure Performed:   Procedure(s):  Right humerus open reduction with internal fixation    Surgeon(s) and Role:     Dandre Taylor MD - Primary

## 2018-11-28 NOTE — DISCHARGE SUMMARY
Outpatient Surgery Brief Discharge Summary         Patient ID:  Vipin Armenta  [id number]  60year old  1/14/1960    Discharge Diagnoses: Right humerus fracture    Procedures: [unfilled]    Discharged Condition: stable    Disposition: home    Patient Instruc

## 2018-11-29 NOTE — OPERATIVE REPORT
[facility]    PATIENT'S NAME: [facility]   ATTENDING PHYSICIAN: Dustin Ruano MD   OPERATING PHYSICIAN: Dustin Ruano MD   PATIENT ACCOUNT#:   [id number]    LOCATION:  [place] [address]  MEDICAL RECORD #:   [id number]       DATE OF RENATO muscle. The biceps was retracted laterally, and the interval between the biceps and the brachialis was developed. The brachialis was split longitudinally between the medial and lateral head.       There was substantial deformity due to displacement of the wound was irrigated. Hemostasis was obtained with an electrocautery device. The periosteum and muscle interval were closed with Vicryl interrupted sutures to reduce the dead space. A Hemovac drain was inserted.   The skin was then closed with nylon inter

## 2018-11-30 RX ORDER — ESCITALOPRAM OXALATE 20 MG/1
TABLET ORAL
Qty: 30 TABLET | Refills: 10 | Status: SHIPPED | OUTPATIENT
Start: 2018-11-30 | End: 2019-10-10

## 2018-12-04 ENCOUNTER — OFFICE VISIT (OUTPATIENT)
Dept: PHYSICAL THERAPY | Facility: HOSPITAL | Age: 58
End: 2018-12-04
Attending: ORTHOPAEDIC SURGERY
Payer: OTHER MISCELLANEOUS

## 2018-12-04 PROCEDURE — 97110 THERAPEUTIC EXERCISES: CPT | Performed by: PHYSICAL THERAPIST

## 2018-12-04 PROCEDURE — 97140 MANUAL THERAPY 1/> REGIONS: CPT | Performed by: PHYSICAL THERAPIST

## 2018-12-04 PROCEDURE — 97163 PT EVAL HIGH COMPLEX 45 MIN: CPT | Performed by: PHYSICAL THERAPIST

## 2018-12-04 NOTE — PROGRESS NOTES
PHYSICAL THERAPY UE LYMPHEDEMA EVALUATION:   Referring Physician: Dr. Patricia Banuelos  Diagnosis: lymphedema, closed fracture of shaft of right humerus, ORIF right humerus  Diagnosis ICD-10: I89.0, S42.301A  Date of Onset: 11/28/18  Insurance: Tasha Lacey Date of Evaluat within the osteoporotic range.  2014:   • Other specified disorders of uterus, not elsewhere classified     endometrial abnormality   • Pregnancy , , 1992    x 3 vag vacuum, sp ,    • Radicular pain     HERPES [employer] / [employer] DEP including various vendors that can be utilized,    Other treatment: Initiated ROM exercises: open/close hand. Wrist flex/ext, forearm pronation/supination, elbow flex/ext, elb ext str, and codmans. Reinforced no active movement at [facility], only Codmans.  Wrist for your referral. Please co-sign or sign and return this letter via fax as soon as possible to [phone].  If you have any questions, please contact me at Dept: [phone]    Sincerely,  Electronically signed by therapist: Julianna Gallardo PT    Physic

## 2018-12-06 ENCOUNTER — OFFICE VISIT (OUTPATIENT)
Dept: PHYSICAL THERAPY | Facility: HOSPITAL | Age: 58
End: 2018-12-06
Attending: ORTHOPAEDIC SURGERY
Payer: OTHER MISCELLANEOUS

## 2018-12-06 PROCEDURE — 97140 MANUAL THERAPY 1/> REGIONS: CPT | Performed by: PHYSICAL THERAPIST

## 2018-12-06 PROCEDURE — 97110 THERAPEUTIC EXERCISES: CPT | Performed by: PHYSICAL THERAPIST

## 2018-12-06 NOTE — PROGRESS NOTES
Referring Physician: Dr. Bree Larios  Diagnosis: lymphedema, closed fracture of shaft of right humerus, ORIF right humerus  Diagnosis ICD-10: I89.0, S42.301A  Date of Onset: 11/28/18  Insurance: [place] Date of Evaluation: 12/4/2018  Date of Order:11/29/18 to reduce swelling (modified due to limited movement right shld): neck sequence, diaphragmatic breathing, A-A, RUE.  Bandaging RUE: compression glove and 2 comprilan    There Ex ( 15 minutes):  AROM/AAROM right elbow  Passive stretching right elbow     Ther

## 2018-12-10 ENCOUNTER — NURSE ONLY (OUTPATIENT)
Dept: HEMATOLOGY/ONCOLOGY | Facility: HOSPITAL | Age: 58
End: 2018-12-10
Attending: INTERNAL MEDICINE
Payer: COMMERCIAL

## 2018-12-10 ENCOUNTER — OFFICE VISIT (OUTPATIENT)
Dept: PHYSICAL THERAPY | Facility: HOSPITAL | Age: 58
End: 2018-12-10
Attending: ORTHOPAEDIC SURGERY
Payer: OTHER MISCELLANEOUS

## 2018-12-10 VITALS
BODY MASS INDEX: 22.88 KG/M2 | HEART RATE: 82 BPM | HEIGHT: 65.5 IN | SYSTOLIC BLOOD PRESSURE: 114 MMHG | RESPIRATION RATE: 18 BRPM | DIASTOLIC BLOOD PRESSURE: 70 MMHG | TEMPERATURE: 98 F | WEIGHT: 139 LBS

## 2018-12-10 DIAGNOSIS — C50.811 MALIGNANT NEOPLASM OF OVERLAPPING SITES OF RIGHT BREAST IN FEMALE, ESTROGEN RECEPTOR POSITIVE (HCC): Primary | ICD-10-CM

## 2018-12-10 DIAGNOSIS — M85.80 OSTEOPENIA DETERMINED BY X-RAY: ICD-10-CM

## 2018-12-10 DIAGNOSIS — Z17.0 MALIGNANT NEOPLASM OF OVERLAPPING SITES OF RIGHT BREAST IN FEMALE, ESTROGEN RECEPTOR POSITIVE (HCC): Primary | ICD-10-CM

## 2018-12-10 DIAGNOSIS — S42.331D CLOSED DISPLACED OBLIQUE FRACTURE OF SHAFT OF RIGHT HUMERUS WITH ROUTINE HEALING, SUBSEQUENT ENCOUNTER: ICD-10-CM

## 2018-12-10 DIAGNOSIS — I89.0 LYMPHEDEMA OF ARM: ICD-10-CM

## 2018-12-10 DIAGNOSIS — C50.811 MALIGNANT NEOPLASM OF OVERLAPPING SITES OF RIGHT BREAST IN FEMALE, ESTROGEN RECEPTOR POSITIVE (HCC): ICD-10-CM

## 2018-12-10 DIAGNOSIS — C68.9 UROTHELIAL CARCINOMA (HCC): ICD-10-CM

## 2018-12-10 DIAGNOSIS — Z17.0 MALIGNANT NEOPLASM OF OVERLAPPING SITES OF RIGHT BREAST IN FEMALE, ESTROGEN RECEPTOR POSITIVE (HCC): ICD-10-CM

## 2018-12-10 DIAGNOSIS — Z45.2 ENCOUNTER FOR ADJUSTMENT OR MANAGEMENT OF VASCULAR ACCESS DEVICE: Primary | ICD-10-CM

## 2018-12-10 DIAGNOSIS — K63.5 MULTIPLE POLYPS OF SIGMOID COLON: ICD-10-CM

## 2018-12-10 PROCEDURE — 80053 COMPREHEN METABOLIC PANEL: CPT

## 2018-12-10 PROCEDURE — 97140 MANUAL THERAPY 1/> REGIONS: CPT | Performed by: PHYSICAL THERAPIST

## 2018-12-10 PROCEDURE — 99214 OFFICE O/P EST MOD 30 MIN: CPT | Performed by: INTERNAL MEDICINE

## 2018-12-10 PROCEDURE — 36591 DRAW BLOOD OFF VENOUS DEVICE: CPT

## 2018-12-10 PROCEDURE — A4216 STERILE WATER/SALINE, 10 ML: HCPCS

## 2018-12-10 PROCEDURE — 97110 THERAPEUTIC EXERCISES: CPT | Performed by: PHYSICAL THERAPIST

## 2018-12-10 PROCEDURE — 85025 COMPLETE CBC W/AUTO DIFF WBC: CPT

## 2018-12-10 RX ORDER — HEPARIN SODIUM (PORCINE) LOCK FLUSH IV SOLN 100 UNIT/ML 100 UNIT/ML
5 SOLUTION INTRAVENOUS ONCE
Status: CANCELLED | OUTPATIENT
Start: 2018-12-10

## 2018-12-10 RX ORDER — HEPARIN SODIUM (PORCINE) LOCK FLUSH IV SOLN 100 UNIT/ML 100 UNIT/ML
SOLUTION INTRAVENOUS
Status: COMPLETED
Start: 2018-12-10 | End: 2018-12-10

## 2018-12-10 RX ORDER — ALENDRONATE SODIUM 70 MG/1
70 TABLET ORAL WEEKLY
Qty: 12 TABLET | Refills: 3 | Status: SHIPPED | OUTPATIENT
Start: 2018-12-10 | End: 2019-06-21 | Stop reason: ALTCHOICE

## 2018-12-10 RX ORDER — 0.9 % SODIUM CHLORIDE 0.9 %
10 VIAL (ML) INJECTION ONCE
Status: CANCELLED | OUTPATIENT
Start: 2018-12-10

## 2018-12-10 RX ORDER — 0.9 % SODIUM CHLORIDE 0.9 %
VIAL (ML) INJECTION
Status: DISCONTINUED
Start: 2018-12-10 | End: 2018-12-10

## 2018-12-10 RX ORDER — HEPARIN SODIUM (PORCINE) LOCK FLUSH IV SOLN 100 UNIT/ML 100 UNIT/ML
5 SOLUTION INTRAVENOUS ONCE
Status: COMPLETED | OUTPATIENT
Start: 2018-12-10 | End: 2018-12-10

## 2018-12-10 RX ADMIN — HEPARIN SODIUM (PORCINE) LOCK FLUSH IV SOLN 100 UNIT/ML 500 UNITS: 100 SOLUTION INTRAVENOUS at 12:50:00

## 2018-12-10 NOTE — PROGRESS NOTES
HPI   Carina Nguyen is a 60year old female who fell at work and sustained a fracture of her right humerus and developed tremendous swelling in her right arm, forearm, and hand. She had ORIF 11/28/2018 per Dr. Jovan Welsh. Beth Jalloh.   She has had lymphedema CHEMOTHERAPY:   DDMVAC q 14 days; Vinblastine 5 mg, Methotrexate 50 mg, Adriamycin 50 mg, Cisplatin 115 mg, Neulasta Cycle 1 - 01/06/14; Cycle 2 01/20/14;  Cycle 3 02/03/14, Cycle 4 2/17/14          3/21/2014 Cancer Staged     ypTis N0 Mx         3/21/201 Current Outpatient Medications:  alendronate 70 MG Oral Tab Take 1 tablet (70 mg total) by mouth once a week.  Disp: 12 tablet Rfl: 3   escitalopram 20 MG Oral Tab TAKE ONE TABLET BY MOUTH ONE TIME DAILY Disp: 30 tablet Rfl: 10   hydrocodone-acetaminophen 7 Performed by Karel Lay MD at [facility]   • D & C     • HUMERUS OPEN REDUCTION INTERNAL FIXATION/ PINNING Right 11/28/2018    Performed by José Manuel Bland MD at [facility] OR   • HYSTERECTOMY  Aug. 2010   • KIDNEY SURGERY Right     nephrecto Back Care: Not Asked        Exercise: Not Asked        Bike Helmet: Not Asked        Seat Belt: Not Asked        Self-Exams: Not Asked    Social History Narrative      Not on file    Family History   Problem Relation Age of Onset   • Heart Disorder F Head (right side): No submental, no submandibular, no preauricular and no posterior auricular adenopathy present. Head (left side): No submental, no submandibular, no preauricular and no posterior auricular adenopathy present.      She has no ce She plans to continue the lexapro 10 mg daily. She has been enjoying her family, friends, and work. She and her family are involved in their vacation [address] for long term.       Her bone density study 6/29/2018 revealed severe osteopen TISSUES SUBMITTED                A. Polyp, cecum   B. Polyp, ascending colon   C. Polyp, hepatic flexure colon   D.  Polyp, sigmoid colon       Meds This Visit:  see list

## 2018-12-10 NOTE — PROGRESS NOTES
Referring Physician: Dr. Regina Green  Diagnosis: lymphedema, closed fracture of shaft of right humerus, ORIF right humerus  Diagnosis ICD-10: I89.0, S42.301A  Date of Onset: 11/28/18  Insurance: [facility] Date of Evaluation: 12/4/2018  Date of Order:11/29/18 therapy:    Date  12/6/18 12/10/18        Visit # 2/12 3/12        MLD x x        Compression x x        Skin Care  x        Exercise x x        Education x x        X = done this date   Exercises: pt verbalized understanding and demonstrated competence.  A to improve ease of lifting and performing household chores- upon MD approve to begin strengthening      Plan:   Progress MLD as able,  Cont w/ ROM elbow and wrist       Charges: mm4,ex1   Total Timed Treatment: 80 min  Total Treatment Time: 80 min

## 2018-12-12 ENCOUNTER — OFFICE VISIT (OUTPATIENT)
Dept: PHYSICAL THERAPY | Facility: HOSPITAL | Age: 58
End: 2018-12-12
Attending: ORTHOPAEDIC SURGERY
Payer: OTHER MISCELLANEOUS

## 2018-12-12 ENCOUNTER — TELEPHONE (OUTPATIENT)
Dept: HEMATOLOGY/ONCOLOGY | Facility: HOSPITAL | Age: 58
End: 2018-12-12

## 2018-12-12 PROCEDURE — 97140 MANUAL THERAPY 1/> REGIONS: CPT | Performed by: PHYSICAL THERAPIST

## 2018-12-12 PROCEDURE — 97110 THERAPEUTIC EXERCISES: CPT | Performed by: PHYSICAL THERAPIST

## 2018-12-12 NOTE — PROGRESS NOTES
Referring Physician: Dr. Tory Blankenship  Diagnosis: lymphedema, closed fracture of shaft of right humerus, ORIF right humerus  Diagnosis ICD-10: I89.0, S42.301A  Date of Onset: 11/28/18  Insurance: [employer]ilene James Date of Evaluation: 12/4/2018  Date of Order:11/29/18 12/12/18       Visit # 2/12 3/12 4/12       MLD x x x       Compression x x x       Skin Care  x x       Exercise x x x       Education x x x       X = done this date   Exercises: pt verbalized understanding and demonstrated competence.  All exercises done improve ease of dressing/bathing/and reaching overhead (upon MD approval to begin shld ROM)  4) Increase right elbow AROM to 0-130 degrees to allow less difficulty bandaging arm for swelling reduction  5) Increase right wrist AROM to flex 80, ext 85 to imp

## 2018-12-14 ENCOUNTER — APPOINTMENT (OUTPATIENT)
Dept: PHYSICAL THERAPY | Facility: HOSPITAL | Age: 58
End: 2018-12-14
Attending: ORTHOPAEDIC SURGERY
Payer: OTHER MISCELLANEOUS

## 2018-12-17 ENCOUNTER — APPOINTMENT (OUTPATIENT)
Dept: PHYSICAL THERAPY | Facility: HOSPITAL | Age: 58
End: 2018-12-17
Attending: ORTHOPAEDIC SURGERY
Payer: OTHER MISCELLANEOUS

## 2018-12-19 ENCOUNTER — OFFICE VISIT (OUTPATIENT)
Dept: PHYSICAL THERAPY | Facility: HOSPITAL | Age: 58
End: 2018-12-19
Attending: ORTHOPAEDIC SURGERY
Payer: OTHER MISCELLANEOUS

## 2018-12-19 PROCEDURE — 97110 THERAPEUTIC EXERCISES: CPT

## 2018-12-19 PROCEDURE — 97140 MANUAL THERAPY 1/> REGIONS: CPT

## 2018-12-19 NOTE — PROGRESS NOTES
Referring Physician: Dr. Kirsty Sutherland  Diagnosis: lymphedema, closed fracture of shaft of right humerus, ORIF right humerus  Diagnosis ICD-10: I89.0, S42.301A  Date of Onset: 11/28/18  Insurance: Marva Arenas Date of Evaluation: 12/4/2018  Date of Order:11/29/18 following components of complete decongestive therapy:    Date  12/6/18 12/10/18 12/12/18 12/19/18      Visit # 2/12 3/12 4/12 5/12      MLD x x x x      Compression x x x x      Skin Care  x x x      Exercise x x x x      Education x x x x      X = done t new orders of PROM of R shoulder at least x 4 more weeks. New MD orders received. Goals (discussed and planned with patient involvement):       To be met in 12 visits:  1) Decrease swelling to be less than 10 % greater than unaffected arm to allow patie

## 2018-12-21 ENCOUNTER — OFFICE VISIT (OUTPATIENT)
Dept: PHYSICAL THERAPY | Facility: HOSPITAL | Age: 58
End: 2018-12-21
Attending: ORTHOPAEDIC SURGERY
Payer: OTHER MISCELLANEOUS

## 2018-12-21 PROCEDURE — 97530 THERAPEUTIC ACTIVITIES: CPT

## 2018-12-21 PROCEDURE — 97140 MANUAL THERAPY 1/> REGIONS: CPT

## 2018-12-21 PROCEDURE — 97110 THERAPEUTIC EXERCISES: CPT

## 2018-12-21 NOTE — PATIENT INSTRUCTIONS
PASSIVE RANGE OF MOTION EXERCISES:    1. Shoulder flexion:  Make sure you are both in a comfortable position           2.   SHoulder Abduction:  (LIKE A JUMPING ELIZABETH MOTION)  **IT MAY BE MORE COMFORTABLE TO SIT AT THE EDGE OF THE BED TO MOVE RYAN\"S AR

## 2018-12-21 NOTE — PROGRESS NOTES
Referring Physician: Dr. Kaitlin Ricci  Diagnosis: lymphedema, closed fracture of shaft of right humerus, ORIF right humerus  Diagnosis ICD-10: I89.0, S42.301A  Date of Onset: 11/28/18  Insurance: [place] Date of Evaluation: 12/4/2018  Date of Order:11/29/18 12/6/18 12/10/18 12/12/18 12/19/18 12/21/18     Visit # 2/12 3/12 4/12 5/12 6/12     MLD x x x x MLD and STM     Compression x x x x x     Skin Care  x x x x     Exercise x x x x x     Education x x x x x     X = done this date   Exercises: pt verbalized u swelling to L UE visually noted. Goals (discussed and planned with patient involvement):       To be met in 12 visits:  1) Decrease swelling to be less than 10 % greater than unaffected arm to allow patient to be fitted for compression garments  2) Pt to

## 2018-12-24 ENCOUNTER — OFFICE VISIT (OUTPATIENT)
Dept: PHYSICAL THERAPY | Facility: HOSPITAL | Age: 58
End: 2018-12-24
Attending: ORTHOPAEDIC SURGERY
Payer: OTHER MISCELLANEOUS

## 2018-12-24 PROCEDURE — 97140 MANUAL THERAPY 1/> REGIONS: CPT

## 2018-12-24 PROCEDURE — 97530 THERAPEUTIC ACTIVITIES: CPT

## 2018-12-24 PROCEDURE — 97110 THERAPEUTIC EXERCISES: CPT

## 2018-12-24 NOTE — PROGRESS NOTES
Referring Physician: Dr. Guera Heller  Diagnosis: lymphedema, closed fracture of shaft of right humerus, ORIF right humerus  Diagnosis ICD-10: I89.0, S42.301A  Date of Onset: 11/28/18  Insurance: [employer] Date of Evaluation: 12/4/2018  Date of Order:11/29/18 Visit # 2/12 3/12 4/12 5/12 6/12 7/12    MLD x x x x MLD and STM MLD and STM    Compression x x x x x x    Skin Care  x x x x x    Exercise x x x x x x    Education x x x x x x    X = done this date   Exercises: pt verbalized understanding and demonstrat AROM to flexion/abd 160 degrees, IR to L4, ER to 75 to improve ease of dressing/bathing/and reaching overhead (upon MD approval to begin [facility] ROM)  4) Increase right elbow AROM to 0-130 degrees to allow less difficulty bandaging arm for swelling reduction

## 2018-12-26 ENCOUNTER — OFFICE VISIT (OUTPATIENT)
Dept: PHYSICAL THERAPY | Facility: HOSPITAL | Age: 58
End: 2018-12-26
Attending: ORTHOPAEDIC SURGERY
Payer: OTHER MISCELLANEOUS

## 2018-12-26 PROCEDURE — 97110 THERAPEUTIC EXERCISES: CPT

## 2018-12-26 PROCEDURE — 97140 MANUAL THERAPY 1/> REGIONS: CPT

## 2018-12-26 NOTE — PROGRESS NOTES
Referring Physician: Dr. Silas Muniz  Diagnosis: lymphedema, closed fracture of shaft of right humerus, ORIF right humerus  Diagnosis ICD-10: I89.0, S42.301A  Date of Onset: 11/28/18  Insurance: [employer] Date of Evaluation: 12/4/2018  Date of Order:11/29/18 # 2/12 3/12 4/12 5/12 6/12 7/12 8/12   MLD x x x x MLD and STM MLD and STM MLD and STM   Compression x x x x x x x   Skin Care  x x x x x x   Exercise x x x x x x x   Education x x x x x x x   X = done this date   Exercises: pt verbalized understanding and improve ease of dressing/bathing/and reaching overhead (upon MD approval to begin shld ROM)  4) Increase right elbow AROM to 0-130 degrees to allow less difficulty bandaging arm for swelling reduction  5) Increase right wrist AROM to flex 80, ext 85 to imp

## 2018-12-27 ENCOUNTER — TELEPHONE (OUTPATIENT)
Dept: HEMATOLOGY/ONCOLOGY | Facility: HOSPITAL | Age: 58
End: 2018-12-27

## 2018-12-27 DIAGNOSIS — C50.919 MALIGNANT NEOPLASM OF FEMALE BREAST (HCC): ICD-10-CM

## 2018-12-27 DIAGNOSIS — I89.0 LYMPHEDEMA: Primary | ICD-10-CM

## 2018-12-27 NOTE — TELEPHONE ENCOUNTER
[facility] is calling on behalf of this patient, The patient is requesting a order for arm sleeves and gloves before the new year. 3 of each, 20-30 mmhg. The ICD-10 codes are I89.0 and C50.919. Please fax or electronically send the orders.

## 2018-12-28 ENCOUNTER — APPOINTMENT (OUTPATIENT)
Dept: PHYSICAL THERAPY | Facility: HOSPITAL | Age: 58
End: 2018-12-28
Attending: ORTHOPAEDIC SURGERY
Payer: OTHER MISCELLANEOUS

## 2018-12-31 ENCOUNTER — APPOINTMENT (OUTPATIENT)
Dept: PHYSICAL THERAPY | Facility: HOSPITAL | Age: 58
End: 2018-12-31
Attending: ORTHOPAEDIC SURGERY
Payer: OTHER MISCELLANEOUS

## 2019-01-02 ENCOUNTER — OFFICE VISIT (OUTPATIENT)
Dept: PHYSICAL THERAPY | Facility: HOSPITAL | Age: 59
End: 2019-01-02
Attending: ORTHOPAEDIC SURGERY
Payer: OTHER MISCELLANEOUS

## 2019-01-02 PROCEDURE — 97140 MANUAL THERAPY 1/> REGIONS: CPT | Performed by: PHYSICAL THERAPIST

## 2019-01-02 PROCEDURE — 97110 THERAPEUTIC EXERCISES: CPT | Performed by: PHYSICAL THERAPIST

## 2019-01-02 NOTE — PROGRESS NOTES
Referring Physician: Dr. Kacy Hernandez  Diagnosis: lymphedema, closed fracture of shaft of right humerus, ORIF right humerus  Diagnosis ICD-10: I89.0, S42.301A  Date of Onset: 11/28/18  Insurance: [facility] Date of Evaluation: 12/4/2018  Date of Order:11/29/18 x-ray 6/16/2014   • Osteoporosis screening 01/16/2014    DEXA SCAN:  The patient's bone density is within the osteoporotic range.  05/28/2014:   • Other specified disorders of uterus, not elsewhere classified     endometrial abnormality   • Pregnancy 1990, 12/12/18 12/19/18 12/21/18 12/24/18 12/26/18 1/2/19   Visit # 2/12 3/12 4/12 5/12 6/12 7/12 8/12 9/12  PN sent   MLD x x x x MLD and STM MLD and STM MLD and STM STM   Compression x x x x x x x x   Skin Care  x x x x x x    Exercise x x x x x x x x   Educat be independent at self management once discharged  3) Increase shoulder AROM to flexion/abd 160 degrees, IR to L4, ER to 75 to improve ease of dressing/bathing/and reaching overhead (upon MD approval to begin shld ROM)  4) Increase right elbow AROM to 0-13

## 2019-01-03 ENCOUNTER — TELEPHONE (OUTPATIENT)
Dept: PHYSICAL THERAPY | Facility: HOSPITAL | Age: 59
End: 2019-01-03

## 2019-01-04 ENCOUNTER — OFFICE VISIT (OUTPATIENT)
Dept: PHYSICAL THERAPY | Facility: HOSPITAL | Age: 59
End: 2019-01-04
Attending: ORTHOPAEDIC SURGERY
Payer: OTHER MISCELLANEOUS

## 2019-01-04 PROCEDURE — 97140 MANUAL THERAPY 1/> REGIONS: CPT | Performed by: PHYSICAL THERAPIST

## 2019-01-04 PROCEDURE — 97110 THERAPEUTIC EXERCISES: CPT | Performed by: PHYSICAL THERAPIST

## 2019-01-04 NOTE — PROGRESS NOTES
Referring Physician: Dr. Rory Pereira  Diagnosis: lymphedema, closed fracture of shaft of right humerus, ORIF right humerus  Diagnosis ICD-10: I89.0, S42.301A  Date of Onset: 11/28/18  Insurance: [employer] Date of Evaluation: 12/4/2018  Date of Order:11/29/18 x x x   Education x x x x x x x x x   X = done this date   Exercises: pt verbalized understanding and demonstrated competence. All exercises done B unless otherwise indicated.    Pt advised to discontinue exercises that increase pain and to call or return t swelling reduction  5) Increase right wrist AROM to flex 80, ext 85 to improve ease of utilizing hand for writing, buttoning shirt- MET  6) Increase UE strength to 4/5 to improve ease of lifting and performing household chores- upon MD approve to begin str

## 2019-01-07 ENCOUNTER — OFFICE VISIT (OUTPATIENT)
Dept: PHYSICAL THERAPY | Facility: HOSPITAL | Age: 59
End: 2019-01-07
Attending: ORTHOPAEDIC SURGERY
Payer: OTHER MISCELLANEOUS

## 2019-01-07 PROCEDURE — 97110 THERAPEUTIC EXERCISES: CPT

## 2019-01-07 PROCEDURE — 97140 MANUAL THERAPY 1/> REGIONS: CPT

## 2019-01-07 NOTE — PROGRESS NOTES
Referring Physician: Dr. Keith Epley  Diagnosis: lymphedema, closed fracture of shaft of right humerus, ORIF right humerus  Diagnosis ICD-10: I89.0, S42.301A  Date of Onset: 11/28/18  Insurance: [employer] Date of Evaluation: 12/4/2018  Date of Order:11/29/18 MLD and STM STM STM and MLD STM and MLD     Compression x x x x x x x x x x     Skin Care  x x x x x x        Exercise x x x x x x x x x x     Education x x x x x x x x x x     X = done this date   Exercises: pt verbalized understanding and demonstrated co once discharged  3) Increase shoulder AROM to flexion/abd 160 degrees, IR to L4, ER to 75 to improve ease of dressing/bathing/and reaching overhead (upon MD approval to begin shld ROM)  4) Increase right elbow AROM to 0-130 degrees to allow less difficulty

## 2019-01-08 ENCOUNTER — APPOINTMENT (OUTPATIENT)
Dept: PHYSICAL THERAPY | Facility: HOSPITAL | Age: 59
End: 2019-01-08
Attending: ORTHOPAEDIC SURGERY
Payer: OTHER MISCELLANEOUS

## 2019-01-09 ENCOUNTER — APPOINTMENT (OUTPATIENT)
Dept: PHYSICAL THERAPY | Facility: HOSPITAL | Age: 59
End: 2019-01-09
Attending: ORTHOPAEDIC SURGERY
Payer: OTHER MISCELLANEOUS

## 2019-01-14 ENCOUNTER — APPOINTMENT (OUTPATIENT)
Dept: PHYSICAL THERAPY | Facility: HOSPITAL | Age: 59
End: 2019-01-14
Attending: ORTHOPAEDIC SURGERY
Payer: OTHER MISCELLANEOUS

## 2019-01-16 ENCOUNTER — OFFICE VISIT (OUTPATIENT)
Dept: PHYSICAL THERAPY | Facility: HOSPITAL | Age: 59
End: 2019-01-16
Attending: ORTHOPAEDIC SURGERY
Payer: OTHER MISCELLANEOUS

## 2019-01-16 PROCEDURE — 97140 MANUAL THERAPY 1/> REGIONS: CPT | Performed by: PHYSICAL THERAPIST

## 2019-01-16 PROCEDURE — 97110 THERAPEUTIC EXERCISES: CPT | Performed by: PHYSICAL THERAPIST

## 2019-01-16 NOTE — PROGRESS NOTES
Referring Physician: Dr. Steven Mathias  Diagnosis: lymphedema, closed fracture of shaft of right humerus, ORIF right humerus  Diagnosis ICD-10: I89.0, S42.301A  Date of Onset: 11/28/18  Insurance: Martha Rizzo Date of Evaluation: 12/4/2018  Date of Order:11/29/18 D 21. 6   MEASUREMENT E 22.8   MEASUREMENT F 23.6   MEASUREMENT G 23.9   MEASUREMENT H 25.6   MEASUREMENT I 27.1   MEASUREMENT J 30.3    TOTAL VOLUME  [id number]   LOCATION/MEASUREMENTS LUE   MEASUREMENT A 16.4   MEASUREMENT B 16.3   MEASUREMENT C 18.7   ME less than 10 % greater than unaffected arm to allow patient to be fitted for compression garments - MET  2) Pt to be independent with self MLD, ROM exercises, and donning/doffing compression bandages/garments to facilitate swelling reduction and to be inde

## 2019-01-17 ENCOUNTER — TELEPHONE (OUTPATIENT)
Dept: PHYSICAL THERAPY | Facility: HOSPITAL | Age: 59
End: 2019-01-17

## 2019-01-18 ENCOUNTER — OFFICE VISIT (OUTPATIENT)
Dept: PHYSICAL THERAPY | Facility: HOSPITAL | Age: 59
End: 2019-01-18
Attending: ORTHOPAEDIC SURGERY
Payer: OTHER MISCELLANEOUS

## 2019-01-18 PROCEDURE — 97110 THERAPEUTIC EXERCISES: CPT

## 2019-01-18 PROCEDURE — 97140 MANUAL THERAPY 1/> REGIONS: CPT

## 2019-01-18 PROCEDURE — 97530 THERAPEUTIC ACTIVITIES: CPT

## 2019-01-18 NOTE — PROGRESS NOTES
Referring Physician: Dr. Maria Luisa Aponte  Diagnosis: lymphedema, closed fracture of shaft of right humerus, ORIF right humerus  Diagnosis ICD-10: I89.0, S42.301A  Date of Onset: 11/28/18  Insurance: [facility] Date of Evaluation: 12/4/2018  Date of Order:11/29/18 Date  12/24/18 12/26/18 1/2/19 1/4/19 1/7/19 1/16/19 1/18/19   Visit # 7/12 8/12 9/12  PN sent 10/20 11/20 12/20 13/20   MLD MLD and STM MLD and STM STM STM and MLD STM and MLD STM and MLD STM and MLD   Compression x x x x x x x   Skin Care x x        Exer Goals (discussed and planned with patient involvement):       To be met in 12 visits:  1) Decrease swelling to be less than 10 % greater than unaffected arm to allow patient to be fitted for compression garments - MET  2) Pt to be independent with self MLD,

## 2019-01-21 ENCOUNTER — OFFICE VISIT (OUTPATIENT)
Dept: PHYSICAL THERAPY | Facility: HOSPITAL | Age: 59
End: 2019-01-21
Attending: ORTHOPAEDIC SURGERY
Payer: OTHER MISCELLANEOUS

## 2019-01-21 PROCEDURE — 97530 THERAPEUTIC ACTIVITIES: CPT

## 2019-01-21 PROCEDURE — 97140 MANUAL THERAPY 1/> REGIONS: CPT

## 2019-01-21 PROCEDURE — 97110 THERAPEUTIC EXERCISES: CPT

## 2019-01-21 NOTE — PROGRESS NOTES
Referring Physician: Dr. Jemma Mtz  Diagnosis: lymphedema, closed fracture of shaft of right humerus, ORIF right humerus  Diagnosis ICD-10: I89.0, S42.301A  Date of Onset: 11/28/18  Insurance: [facility] Date of Evaluation: 12/4/2018  Date of Order:11/29/18 13/20 14/20   MLD MLD and STM MLD and STM STM STM and MLD STM and MLD STM and MLD STM and MLD STM   Compression x x x x x x x x   Skin Care x x         Exercise x x x x x x x x   Education x x x x x x x x   X = done this date   Exercises: pt verbalized und to begin shld ROM)  4) Increase right elbow AROM to 0-130 degrees to allow less difficulty bandaging arm for swelling reduction   5) Increase right wrist AROM to flex 80, ext 85 to improve ease of utilizing hand for writing, buttoning shirt- MET  6) Adele Bates

## 2019-01-23 ENCOUNTER — OFFICE VISIT (OUTPATIENT)
Dept: PHYSICAL THERAPY | Facility: HOSPITAL | Age: 59
End: 2019-01-23
Attending: ORTHOPAEDIC SURGERY
Payer: OTHER MISCELLANEOUS

## 2019-01-23 PROCEDURE — 97110 THERAPEUTIC EXERCISES: CPT

## 2019-01-23 PROCEDURE — 97140 MANUAL THERAPY 1/> REGIONS: CPT

## 2019-01-23 NOTE — PROGRESS NOTES
Referring Physician: Dr. Keith Epley  Diagnosis: lymphedema, closed fracture of shaft of right humerus, ORIF right humerus  Diagnosis ICD-10: I89.0, S42.301A  Date of Onset: 11/28/18  Insurance: [facility] Date of Evaluation: 12/4/2018  Date of Order:11/29/18 and STM MLD and STM STM STM and MLD STM and MLD STM and MLD STM and MLD STM MLD/STM   Compression x x x x x x x x x   Skin Care x x          Exercise x x x x x x x x x   Education x x x x x x x x x   X = done this date   Exercises: pt verbalized understand met in 12 visits:  1) Decrease swelling to be less than 10 % greater than unaffected arm to allow patient to be fitted for compression garments - MET  2) Pt to be independent with self MLD, ROM exercises, and donning/doffing compression bandages/garments t

## 2019-01-28 ENCOUNTER — APPOINTMENT (OUTPATIENT)
Dept: PHYSICAL THERAPY | Facility: HOSPITAL | Age: 59
End: 2019-01-28
Attending: ORTHOPAEDIC SURGERY
Payer: OTHER MISCELLANEOUS

## 2019-01-30 ENCOUNTER — APPOINTMENT (OUTPATIENT)
Dept: PHYSICAL THERAPY | Facility: HOSPITAL | Age: 59
End: 2019-01-30
Attending: ORTHOPAEDIC SURGERY
Payer: OTHER MISCELLANEOUS

## 2019-01-30 RX ORDER — ANASTROZOLE 1 MG/1
TABLET ORAL
Qty: 30 TABLET | Refills: 11 | Status: SHIPPED | OUTPATIENT
Start: 2019-01-30 | End: 2019-05-07

## 2019-02-01 ENCOUNTER — OFFICE VISIT (OUTPATIENT)
Dept: PHYSICAL THERAPY | Facility: HOSPITAL | Age: 59
End: 2019-02-01
Attending: PEDIATRICS
Payer: OTHER MISCELLANEOUS

## 2019-02-01 PROCEDURE — 97110 THERAPEUTIC EXERCISES: CPT

## 2019-02-01 NOTE — PROGRESS NOTES
Referring Physician: Dr. Ian Flores  Diagnosis: lymphedema, closed fracture of shaft of right humerus, ORIF right humerus  Diagnosis ICD-10: I89.0, S42.301A  Date of Onset: 11/28/18  Insurance: [facility] Date of Evaluation: 12/4/2018  Date of Order:11/29/18 Skin Care        Exercise x x x     Education x x x     X = done this date   Exercises: pt verbalized understanding and demonstrated competence. All exercises done B unless otherwise indicated.    Pt advised to discontinue exercises that increase pain and AROM to flexion/abd 160 degrees, IR to L4, ER to 75 to improve ease of dressing/bathing/and reaching overhead (upon MD approval to begin [facility] ROM)  4) Increase right elbow AROM to 0-130 degrees to allow less difficulty bandaging arm for swelling reduction

## 2019-02-04 ENCOUNTER — OFFICE VISIT (OUTPATIENT)
Dept: PHYSICAL THERAPY | Facility: HOSPITAL | Age: 59
End: 2019-02-04
Attending: ORTHOPAEDIC SURGERY
Payer: OTHER MISCELLANEOUS

## 2019-02-04 PROCEDURE — 97140 MANUAL THERAPY 1/> REGIONS: CPT

## 2019-02-04 PROCEDURE — 97110 THERAPEUTIC EXERCISES: CPT

## 2019-02-04 NOTE — PROGRESS NOTES
Referring Physician: Dr. Chaya Fothergill  Diagnosis: lymphedema, closed fracture of shaft of right humerus, ORIF right humerus  Diagnosis ICD-10: I89.0, S42.301A  Date of Onset: 11/28/18  Insurance: Brian Gamino Date of Evaluation: 12/4/2018  Date of Order:11/29/18 Patient/Family/Caregiver was advised of these findings, precautions, and treatment options and has agreed to actively participate in planning and for this course of care.   Subjective:   Pt states that she is going to return to her gym exercises beginni seconds x 5 each  Prone scap exercises 2 x 10 each 2# RUE (6 motions as previously noted)  Prone snow angels 0# 2 x 10  Quadruped position: LUE lifts 2x10, LUE/RLE lifts x 10  Lifting 2 and 3# weights with RUE (simulate setting table, serving plates of robert other ADL's      Plan:   Progress AROM and strengthening of RUE as well as scapular stabilization per MD orders and         Charges: Ex3  Total Timed Treatment: 45 min  Total Treatment Time: 45 min

## 2019-02-06 ENCOUNTER — OFFICE VISIT (OUTPATIENT)
Dept: PHYSICAL THERAPY | Facility: HOSPITAL | Age: 59
End: 2019-02-06
Attending: ORTHOPAEDIC SURGERY
Payer: OTHER MISCELLANEOUS

## 2019-02-06 PROCEDURE — 97110 THERAPEUTIC EXERCISES: CPT | Performed by: PHYSICAL THERAPIST

## 2019-02-06 PROCEDURE — 97530 THERAPEUTIC ACTIVITIES: CPT | Performed by: PHYSICAL THERAPIST

## 2019-02-06 NOTE — PROGRESS NOTES
Referring Physician: Dr. Kirsty Sutherland  Diagnosis: lymphedema, closed fracture of shaft of right humerus, ORIF right humerus  Diagnosis ICD-10: I89.0, S42.301A  Date of Onset: 11/28/18  Insurance: [facility] Date of Evaluation: 12/4/2018  Date of Order:11/29/18 nightly Pt wears compression daily and nightly x   Skin Care        Exercise x x x x x   Education x x x x x   X = done this date   Exercises: pt verbalized understanding and demonstrated competence. All exercises done B unless otherwise indicated.    Pt ad household chores- upon MD approve to begin strengthening - Met for flex and abduction, continue towards for ER  -New goals  7) Increase UE strength to 4+/5 to improve ease of lifting work trays and performing household chores and other ADL's      Plan:   C

## 2019-02-06 NOTE — PATIENT INSTRUCTIONS
To do the Mini Band Pull Down, place a Mini Band around the back of your hands, wrists or even your forearms. To make the move easier, place the band closer to your elbows. Then with the band around your arms, extend your arms up toward the ceiling.  Georgi Moore ? #1 Straight Scapular Band Fly – To do the Straight Scapular Band Fly, hold the resistance band with your hands about shoulder-width apart.  The closer together they are the harder the move will be and the farther apart they are the easier the move will be ? #2 Overhead Scapular Band Fly – To do the Overhead Scapular Band Fly, you will hold the band with your hands about shoulder-width apart like you did for the Straight Scapular Band Fly. Then raise your arms out overhead and slightly in front of you.  Colette Barahona Quadruped position:  Try balancing on the R arm. You can use dumbbells for added weight. This may also help with your hand position. Place 1-2 tennis balls between your shoulder blades or where you feel tightness in your upper back.   You can move your

## 2019-02-11 ENCOUNTER — OFFICE VISIT (OUTPATIENT)
Dept: PHYSICAL THERAPY | Facility: HOSPITAL | Age: 59
End: 2019-02-11
Attending: ORTHOPAEDIC SURGERY
Payer: OTHER MISCELLANEOUS

## 2019-02-11 PROCEDURE — 97110 THERAPEUTIC EXERCISES: CPT

## 2019-02-11 PROCEDURE — 97140 MANUAL THERAPY 1/> REGIONS: CPT

## 2019-02-11 NOTE — PROGRESS NOTES
Referring Physician: Dr. Mely Chacon  Diagnosis: lymphedema, closed fracture of shaft of right humerus, ORIF right humerus  Diagnosis ICD-10: I89.0, S42.301A  Date of Onset: 11/28/18  Insurance: Farrukh March Date of Evaluation: 12/4/2018  Date of Order:11/29/18 extension 4/5          Treatment:  Includes the following components of complete decongestive therapy:    Date  1/21/19 1/23/19 2/1/19 2/4/19 2/6/19 2/11/19    Visit # 14/20 15/20 16/20 17/20 18/20 19/20    MLD STM MLD/STM STM MLD/STM  MLD/STM    Compressi flex/abduction  4) Increase right elbow AROM to 0-130 degrees to allow less difficulty bandaging arm for swelling reduction -MET  5) Increase right wrist AROM to flex 80, ext 85 to improve ease of utilizing hand for writing, buttoning shirt- MET  6) Kristina Loza

## 2019-02-13 ENCOUNTER — OFFICE VISIT (OUTPATIENT)
Dept: PHYSICAL THERAPY | Facility: HOSPITAL | Age: 59
End: 2019-02-13
Attending: ORTHOPAEDIC SURGERY
Payer: OTHER MISCELLANEOUS

## 2019-02-13 PROCEDURE — 97140 MANUAL THERAPY 1/> REGIONS: CPT

## 2019-02-13 PROCEDURE — 97110 THERAPEUTIC EXERCISES: CPT

## 2019-02-13 NOTE — PROGRESS NOTES
Referring Physician: Dr. Jemma Mtz  Diagnosis: lymphedema, closed fracture of shaft of right humerus, ORIF right humerus  Diagnosis ICD-10: I89.0, S42.301A  Date of Onset: 11/28/18  Insurance: [place] Date of Evaluation: 12/4/2018  Date of Order:11/29/18 ZOSTER / MARCAINE DEPO MEDROL T10-12   • Urothelial carcinoma ([facility] [address] 3/21/2014    right       Medication Changes since last visit?: No    MD FOLLOW UP: 2/28/19   Pain:  \"0\"/10    Subjective:   Pt states that she is doing good, her exercises are going well 5#  Overhead triceps 5# 2 x 10  Forward flexion to 90 deg 3# 2 x 10  Shoulder abduction  To 90 deg 3# 2 x 10        There Act ( 0 minutes):            Assessment:   Pt continues to respond well to progression of strengthening exercises.   Good follow throug

## 2019-02-18 ENCOUNTER — OFFICE VISIT (OUTPATIENT)
Dept: PHYSICAL THERAPY | Facility: HOSPITAL | Age: 59
End: 2019-02-18
Attending: ORTHOPAEDIC SURGERY
Payer: OTHER MISCELLANEOUS

## 2019-02-18 PROCEDURE — 97530 THERAPEUTIC ACTIVITIES: CPT

## 2019-02-18 PROCEDURE — 97140 MANUAL THERAPY 1/> REGIONS: CPT

## 2019-02-18 PROCEDURE — 97110 THERAPEUTIC EXERCISES: CPT

## 2019-02-18 NOTE — PROGRESS NOTES
Referring Physician: Dr. Deann Tuttle  Diagnosis: lymphedema, closed fracture of shaft of right humerus, ORIF right humerus  Diagnosis ICD-10: I89.0, S42.301A  Date of Onset: 11/28/18  Insurance: [facility] Date of Evaluation: 12/4/2018  Date of Order:11/29/18 Date  1/21/19 1/23/19 2/1/19 2/4/19 2/6/19 2/11/19 2/13/19 2/18/19   Visit # 14/20 15/20 16/20 17/20 18/20 19/20 20/20 21/26   MLD STM MLD/STM STM MLD/STM  MLD/STM MLD/STM MLD/STM   Compression x x Pt wears daily and nightly Pt wears compression daily and 1) Decrease swelling to be less than 10 % greater than unaffected arm to allow patient to be fitted for compression garments - MET  2) Pt to be independent with self MLD, ROM exercises, and donning/doffing compression bandages/garments to facilitate swelli

## 2019-02-20 ENCOUNTER — APPOINTMENT (OUTPATIENT)
Dept: PHYSICAL THERAPY | Facility: HOSPITAL | Age: 59
End: 2019-02-20
Attending: ORTHOPAEDIC SURGERY
Payer: OTHER MISCELLANEOUS

## 2019-02-25 ENCOUNTER — APPOINTMENT (OUTPATIENT)
Dept: PHYSICAL THERAPY | Facility: HOSPITAL | Age: 59
End: 2019-02-25
Attending: ORTHOPAEDIC SURGERY
Payer: OTHER MISCELLANEOUS

## 2019-02-26 ENCOUNTER — HOSPITAL ENCOUNTER (OUTPATIENT)
Dept: MAMMOGRAPHY | Age: 59
Discharge: HOME | End: 2019-02-26
Attending: INTERNAL MEDICINE
Payer: COMMERCIAL

## 2019-02-26 DIAGNOSIS — Z12.39 SCREENING FOR BREAST CANCER: ICD-10-CM

## 2019-02-26 DIAGNOSIS — Z85.3 PERSONAL HISTORY OF BREAST CANCER: ICD-10-CM

## 2019-02-26 PROCEDURE — 77063 BREAST TOMOSYNTHESIS BI: CPT | Performed by: INTERNAL MEDICINE

## 2019-02-26 PROCEDURE — 77067 SCR MAMMO BI INCL CAD: CPT | Performed by: INTERNAL MEDICINE

## 2019-03-01 ENCOUNTER — OFFICE VISIT (OUTPATIENT)
Dept: PHYSICAL THERAPY | Facility: HOSPITAL | Age: 59
End: 2019-03-01
Attending: ORTHOPAEDIC SURGERY
Payer: OTHER MISCELLANEOUS

## 2019-03-01 PROCEDURE — 97110 THERAPEUTIC EXERCISES: CPT | Performed by: PHYSICAL THERAPIST

## 2019-03-01 PROCEDURE — 97530 THERAPEUTIC ACTIVITIES: CPT | Performed by: PHYSICAL THERAPIST

## 2019-03-01 NOTE — PROGRESS NOTES
Referring Physician: Dr. Kendra Potts  Diagnosis: lymphedema, closed fracture of shaft of right humerus, ORIF right humerus  Diagnosis ICD-10: I89.0, S42.301A  Date of Onset: 11/28/18  Insurance: [facility] Date of Evaluation: 12/4/2018  Date of Order:11/29/18 following components of complete decongestive therapy:    Date  1/21/19 1/23/19 2/1/19 2/4/19 2/6/19 2/11/19 2/13/19 2/18/19   Visit # 14/20 15/20 16/20 17/20 18/20 19/20 20/20 21/26   MLD STM MLD/STM STM MLD/STM  MLD/STM MLD/STM MLD/STM   Compression x x flex/abduction MET  4) Increase right elbow AROM to 0-130 degrees to allow less difficulty bandaging arm for swelling reduction -MET  5) Increase right wrist AROM to flex 80, ext 85 to improve ease of utilizing hand for writing, buttoning shirt- MET  6) In

## 2019-03-11 ENCOUNTER — OFFICE VISIT (OUTPATIENT)
Dept: HEMATOLOGY/ONCOLOGY | Facility: HOSPITAL | Age: 59
End: 2019-03-11
Attending: INTERNAL MEDICINE
Payer: COMMERCIAL

## 2019-03-11 VITALS
HEART RATE: 58 BPM | SYSTOLIC BLOOD PRESSURE: 127 MMHG | WEIGHT: 137 LBS | RESPIRATION RATE: 16 BRPM | BODY MASS INDEX: 22.55 KG/M2 | TEMPERATURE: 98 F | HEIGHT: 65.5 IN | DIASTOLIC BLOOD PRESSURE: 84 MMHG | OXYGEN SATURATION: 100 %

## 2019-03-11 DIAGNOSIS — Z79.811 ENCOUNTER FOR MONITORING AROMATASE INHIBITOR THERAPY: ICD-10-CM

## 2019-03-11 DIAGNOSIS — C68.9 UROTHELIAL CARCINOMA (HCC): ICD-10-CM

## 2019-03-11 DIAGNOSIS — C50.811 MALIGNANT NEOPLASM OF OVERLAPPING SITES OF RIGHT BREAST IN FEMALE, ESTROGEN RECEPTOR POSITIVE (HCC): Primary | ICD-10-CM

## 2019-03-11 DIAGNOSIS — I89.0 LYMPHEDEMA OF RIGHT ARM: ICD-10-CM

## 2019-03-11 DIAGNOSIS — Z17.0 MALIGNANT NEOPLASM OF OVERLAPPING SITES OF RIGHT BREAST IN FEMALE, ESTROGEN RECEPTOR POSITIVE (HCC): Primary | ICD-10-CM

## 2019-03-11 DIAGNOSIS — S42.331S CLOSED DISPLACED OBLIQUE FRACTURE OF SHAFT OF RIGHT HUMERUS, SEQUELA: ICD-10-CM

## 2019-03-11 DIAGNOSIS — D12.5 ADENOMATOUS POLYP OF SIGMOID COLON: ICD-10-CM

## 2019-03-11 DIAGNOSIS — R92.2 DENSE BREASTS: ICD-10-CM

## 2019-03-11 DIAGNOSIS — Z51.81 ENCOUNTER FOR MONITORING AROMATASE INHIBITOR THERAPY: ICD-10-CM

## 2019-03-11 DIAGNOSIS — Z45.2 ENCOUNTER FOR ADJUSTMENT OR MANAGEMENT OF VASCULAR ACCESS DEVICE: Primary | ICD-10-CM

## 2019-03-11 DIAGNOSIS — D64.9 ANEMIA: ICD-10-CM

## 2019-03-11 DIAGNOSIS — M85.80 OSTEOPENIA DETERMINED BY X-RAY: ICD-10-CM

## 2019-03-11 LAB
ALBUMIN SERPL-MCNC: 3.7 G/DL (ref 3.4–5)
ALBUMIN/GLOB SERPL: 1.2 {RATIO} (ref 1–2)
ALP LIVER SERPL-CCNC: 107 U/L (ref 46–118)
ALT SERPL-CCNC: 14 U/L (ref 13–56)
ANION GAP SERPL CALC-SCNC: 7 MMOL/L (ref 0–18)
AST SERPL-CCNC: 15 U/L (ref 15–37)
BASOPHILS # BLD AUTO: 0.04 X10(3) UL (ref 0–0.2)
BASOPHILS NFR BLD AUTO: 1 %
BILIRUB SERPL-MCNC: 0.6 MG/DL (ref 0.1–2)
BUN BLD-MCNC: 20 MG/DL (ref 7–18)
BUN/CREAT SERPL: 19.4 (ref 10–20)
CALCIUM BLD-MCNC: 8.8 MG/DL (ref 8.5–10.1)
CHLORIDE SERPL-SCNC: 109 MMOL/L (ref 98–107)
CO2 SERPL-SCNC: 26 MMOL/L (ref 21–32)
CREAT BLD-MCNC: 1.03 MG/DL (ref 0.55–1.02)
DEPRECATED HBV CORE AB SER IA-ACNC: 28 NG/ML (ref 18–340)
DEPRECATED RDW RBC AUTO: 44.4 FL (ref 35.1–46.3)
EOSINOPHIL # BLD AUTO: 0.13 X10(3) UL (ref 0–0.7)
EOSINOPHIL NFR BLD AUTO: 3.3 %
ERYTHROCYTE [DISTWIDTH] IN BLOOD BY AUTOMATED COUNT: 13.1 % (ref 11–15)
FOLATE SERPL-MCNC: 18.9 NG/ML (ref 8.7–?)
GLOBULIN PLAS-MCNC: 3.1 G/DL (ref 2.8–4.4)
GLUCOSE BLD-MCNC: 107 MG/DL (ref 70–99)
HCT VFR BLD AUTO: 39.5 % (ref 35–48)
HGB BLD-MCNC: 12.1 G/DL (ref 12–16)
IMM GRANULOCYTES # BLD AUTO: 0 X10(3) UL (ref 0–1)
IMM GRANULOCYTES NFR BLD: 0 %
IRON SATURATION: 30 % (ref 15–50)
IRON SERPL-MCNC: 118 UG/DL (ref 50–170)
LYMPHOCYTES # BLD AUTO: 1 X10(3) UL (ref 1–4)
LYMPHOCYTES NFR BLD AUTO: 25.5 %
M PROTEIN MFR SERPL ELPH: 6.8 G/DL (ref 6.4–8.2)
MCH RBC QN AUTO: 28.3 PG (ref 26–34)
MCHC RBC AUTO-ENTMCNC: 30.6 G/DL (ref 31–37)
MCV RBC AUTO: 92.3 FL (ref 80–100)
MONOCYTES # BLD AUTO: 0.25 X10(3) UL (ref 0.1–1)
MONOCYTES NFR BLD AUTO: 6.4 %
NEUTROPHILS # BLD AUTO: 2.5 X10 (3) UL (ref 1.5–7.7)
NEUTROPHILS # BLD AUTO: 2.5 X10(3) UL (ref 1.5–7.7)
NEUTROPHILS NFR BLD AUTO: 63.8 %
OSMOLALITY SERPL CALC.SUM OF ELEC: 297 MOSM/KG (ref 275–295)
PLATELET # BLD AUTO: 237 10(3)UL (ref 150–450)
POTASSIUM SERPL-SCNC: 4.3 MMOL/L (ref 3.5–5.1)
RBC # BLD AUTO: 4.28 X10(6)UL (ref 3.8–5.3)
SODIUM SERPL-SCNC: 142 MMOL/L (ref 136–145)
TOTAL IRON BINDING CAPACITY: 398 UG/DL (ref 240–450)
TRANSFERRIN SERPL-MCNC: 267 MG/DL (ref 200–360)
VIT B12 SERPL-MCNC: 455 PG/ML (ref 193–986)
WBC # BLD AUTO: 3.9 X10(3) UL (ref 4–11)

## 2019-03-11 PROCEDURE — 36591 DRAW BLOOD OFF VENOUS DEVICE: CPT

## 2019-03-11 PROCEDURE — 82607 VITAMIN B-12: CPT

## 2019-03-11 PROCEDURE — 85025 COMPLETE CBC W/AUTO DIFF WBC: CPT

## 2019-03-11 PROCEDURE — 99214 OFFICE O/P EST MOD 30 MIN: CPT | Performed by: INTERNAL MEDICINE

## 2019-03-11 PROCEDURE — 80053 COMPREHEN METABOLIC PANEL: CPT

## 2019-03-11 PROCEDURE — 84466 ASSAY OF TRANSFERRIN: CPT

## 2019-03-11 PROCEDURE — 82746 ASSAY OF FOLIC ACID SERUM: CPT

## 2019-03-11 PROCEDURE — 83540 ASSAY OF IRON: CPT

## 2019-03-11 PROCEDURE — 82728 ASSAY OF FERRITIN: CPT

## 2019-03-11 RX ORDER — 0.9 % SODIUM CHLORIDE 0.9 %
10 VIAL (ML) INJECTION ONCE
Status: CANCELLED | OUTPATIENT
Start: 2019-03-11

## 2019-03-11 RX ORDER — 0.9 % SODIUM CHLORIDE 0.9 %
VIAL (ML) INJECTION
Status: DISCONTINUED
Start: 2019-03-11 | End: 2019-03-11

## 2019-03-11 RX ORDER — HEPARIN SODIUM (PORCINE) LOCK FLUSH IV SOLN 100 UNIT/ML 100 UNIT/ML
5 SOLUTION INTRAVENOUS ONCE
Status: COMPLETED | OUTPATIENT
Start: 2019-03-11 | End: 2019-03-11

## 2019-03-11 RX ORDER — HEPARIN SODIUM (PORCINE) LOCK FLUSH IV SOLN 100 UNIT/ML 100 UNIT/ML
5 SOLUTION INTRAVENOUS ONCE
Status: CANCELLED | OUTPATIENT
Start: 2019-03-11

## 2019-03-11 RX ADMIN — HEPARIN SODIUM (PORCINE) LOCK FLUSH IV SOLN 100 UNIT/ML 500 UNITS: 100 SOLUTION INTRAVENOUS at 13:06:00

## 2019-03-18 PROBLEM — I89.0 LYMPHEDEMA OF RIGHT ARM: Status: ACTIVE | Noted: 2019-03-18

## 2019-03-19 NOTE — PROGRESS NOTES
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 She is not aware of a change in either breast or lymphadenopathy.   She has not abdominal or pelv RIGHT NEPHROURETERECTOMY at [facility] by Dr. Marquise VARGAS [address]:  Rt retroperitoneal and pelvis lymph nodes; dissection:  11 lymph nodes, no tumor.   Rt kidney, ureter, stent and bladder cuff; nephroureterectomy:  All margins of resection, free of tumor escitalopram 20 MG Oral Tab TAKE ONE TABLET BY MOUTH ONE TIME DAILY Disp: 30 tablet Rfl: 10   Cholecalciferol (VITAMIN D) 2000 units Oral Cap Take 1 capsule by mouth daily.  Disp:  Rfl:    ANASTROZOLE 1 MG Oral Tab tab TAKE ONE TABLET BY MOUTH EVERY DAY Dis CONE BIOPSY    • OTHER SURGICAL HISTORY  2008    D&C due to endometrial abnormality   • OTHER SURGICAL HISTORY  1991    HAND SURGERY    • OTHER SURGICAL HISTORY  2004    D&C, TUBALLIGATION    • PORT, INDWELLING, IMP     • RADIATION RIGHT  2008   • TOTAL A Caffeine Concern: Yes          COFFEE 1 CUP/DAILY        Occupational Exposure: Not Asked        Hobby Hazards: Not Asked        Sleep Concern: Not Asked        Stress Concern: Not Asked        Weight Concern: Not Asked        Special Diet: Not Asked Abdominal: Soft. Bowel sounds are normal. She exhibits no distension and no mass. There is no tenderness. Surgical scars   Musculoskeletal: Normal range of motion. She exhibits no edema.    Edema in the right arm, forearm, and hand has nearly resolved   L Patient does not have recurrent symptoms of her right ypTis N0 M0 urothelial carcinoma following dose dense MVAC and right nephro ureterectomy. Her CT scan 9/12/2018 is stable without metastatic disease. Her chest x-ray was also negative.   Patient saw  0.00 - 0.20 x10(3) uL 0.04 0.1 0.0   Immature Granulocyte Absolute      0.00 - 1.00 x10(3) uL 0.00     Neutrophils %      % 63.8 62 62   Lymphocytes %      % 25.5 27 29   Monocytes %      % 6.4 6 7   Eosinophils %      % 3.3 4 2   Basophils %      % 1. RIGHT BREAST:  No significant suspicious finding. No significant change has occurred. Stable postlumpectomy appearance. LEFT BREAST:  No significant suspicious finding.   No significant change has occurred.     =====  CONCLUSION:     BI-RADS CATEGORY:

## 2019-04-02 ENCOUNTER — APPOINTMENT (OUTPATIENT)
Dept: PHYSICAL THERAPY | Facility: HOSPITAL | Age: 59
End: 2019-04-02
Attending: ORTHOPAEDIC SURGERY
Payer: COMMERCIAL

## 2019-04-05 ENCOUNTER — OFFICE VISIT (OUTPATIENT)
Dept: PHYSICAL THERAPY | Facility: HOSPITAL | Age: 59
End: 2019-04-05
Attending: INTERNAL MEDICINE
Payer: OTHER MISCELLANEOUS

## 2019-04-05 PROCEDURE — 97110 THERAPEUTIC EXERCISES: CPT | Performed by: PHYSICAL THERAPIST

## 2019-04-05 PROCEDURE — 97530 THERAPEUTIC ACTIVITIES: CPT | Performed by: PHYSICAL THERAPIST

## 2019-04-05 NOTE — PROGRESS NOTES
Referring Physician: Dr. Keith Epley  Diagnosis: lymphedema, closed fracture of shaft of right humerus, ORIF right humerus  Diagnosis ICD-10: I89.0, S42.301A  Date of Onset: 11/28/18  Insurance: [employer] Date of Evaluation: 12/4/2018  Date of Order:11/29/18 glove  Swelling in RUE continues to appears to be well managed.     R shoulder AROM in standing:  Flexion 160 deg  Abduction 165 deg  IR T 7  ER 90    Elbow AROM 0-130    Flexibility pects and lats WFL    MMT: R shoulder flexion 5/5           R abduction 5/ worsens to f/u w/ MD  - Patient also stating mm soreness of upper back and L upper trap- patient w/ increased firing L upper trap- again reviewed overuse of LUE due to favoring RUE.  Discussed seeing massage therapy on occasion for mm relief      There Act

## 2019-05-01 NOTE — TELEPHONE ENCOUNTER
5/1/2019         RE: Delbert Dowling  [address] [facility] [address]        Dear Colleague,    Thank you for referring your patient, Delbert Dowling, to the [facility]. Please see a copy of my visit note below.    Orthopedic Clinic Post-Operative Note    CHIEF COMPLAINT:   Chief Complaint   Patient presents with     RECHECK     left ankle follow up     Surgical Followup     DOS: 3/22/2019~ Left ankle open reduction and internal fixation, lateral malleolus and syndesmosis. ~6 weeks postop       HISTORY OF PRESENT ILLNESS  Doing well.  No pain.  Wearing his boot.  He has occasionally put some weight on the foot with no issues.    Patient's past medical, surgical, social and family histories reviewed.     Past Medical History:   Diagnosis Date     Depression      History of tobacco abuse     quit 2006     HTN (hypertension)        Past Surgical History:   Procedure Laterality Date     OPEN REDUCTION INTERNAL FIXATION ANKLE Left 3/22/2019    Procedure: OPEN REDUCTION INTERNAL FIXATION LEFT ANKLE;  Surgeon: Amrit Rivera DO;  Location:  OR       Medications:    Current Outpatient Medications on File Prior to Visit:  acetaminophen (TYLENOL) 325 MG tablet Take 3 tablets (975 mg) by mouth every 8 hours as needed for mild pain (Patient not taking: Reported on 4/3/2019)   atenolol (TENORMIN) 100 MG tablet Take 1 tablet (100 mg) by mouth daily   buPROPion (WELLBUTRIN XL) 150 MG 24 hr tablet Take 1 tablet (150 mg) by mouth daily   citalopram (CELEXA) 40 MG tablet Take 1 tablet (40 mg) by mouth daily   hydrochlorothiazide (HYDRODIURIL) 25 MG tablet TAKE 1 TAB BY MOUTH ONCE DAILY IN THE MORNING   Multiple Vitamins-Minerals (MULTIVITAMIN ADULT PO)    rivaroxaban ANTICOAGULANT (XARELTO) 15 MG TABS tablet Take 1 tablet (15 mg) by mouth 2 times daily (with meals)   senna-docusate (SENOKOT-S/PERICOLACE) 8.6-50 MG tablet Take 1-2 tablets by mouth 2 times daily (Patient not taking: Reported on 4/3/2019)  Last Procedure:  12/6/2016  Last Diagnosis:  History of colon polyps (one adenomatous and one large serrated adenoma)  Recalled for (years): 1 years  Sedation used previously:  IV sedation  Last Prep Used (if known):  Miralax Gatorade prep  Quality of prep   tiZANidine (ZANAFLEX) 2 MG tablet Take 1-2 tablets (2-4 mg) by mouth 3 times daily as needed for muscle spasms (Patient not taking: Reported on 4/3/2019)     No current facility-administered medications on file prior to visit.     Allergies   Allergen Reactions     Eliquis [Apixaban] Hives and Swelling     Lisinopril Cough       Social History     Occupational History     Comment: [employer]   Tobacco Use     Smoking status: Former Smoker     Packs/day: 1.00     Years: 20.00     Pack years: 20.00     Smokeless tobacco: Never Used     Tobacco comment: quit 13 years    Substance and Sexual Activity     Alcohol use: Yes     Comment: none      Drug use: No     Sexual activity: Yes     Partners: Female       Family History   Problem Relation Age of Onset     Chronic Obstructive Pulmonary Disease Mother      Hypertension Father        REVIEW OF SYSTEMS  General: negative for, night sweats, dizziness, fatigue  Resp: No shortness of breath and no cough  CV: negative for chest pain, syncope or near-syncope  GI: negative for nausea, vomiting and diarrhea  : negative for dysuria and hematuria  Musculoskeletal: as above  Neurologic: negative for syncope   Hematologic: negative for bleeding disorder    Physical Exam:  Vitals: /62   Pulse 64   Ht 1.829 m (6')   Wt (!) 164.7 kg (363 lb)   BMI 49.23 kg/m     BMI= Body mass index is 49.23 kg/m .  Constitutional: healthy, alert and no acute distress   Psychiatric: mentation appears normal and affect normal/bright  NEURO: no focal deficits  SKIN: .well healed, no erythema, no incision breakdown and no drainage.  JOINT/EXTREMITIES: Affected stiffness with dorsiflexion and plantarflexion.  Distal neurovascular intact.  No edema.  No pain with rotation.  GAIT: not tested     Diagnostic Modalities:  left ankle X-ray: Lateral malleolus plate and screws in place.  No evidence of loosening or position changes.  Syndesmosis fixation noted as well.  Again no changes.   Decreased lucency at the fracture site.  There is some calcification along the syndesmosis.  Independent visualization of the images was performed.      Impression:   Chief Complaint   Patient presents with     RECHECK     left ankle follow up     Surgical Followup     DOS: 3/22/2019~ Left ankle open reduction and internal fixation, lateral malleolus and syndesmosis. ~6 weeks postop   Routine healing.    Plan:   I reviewed his radiographs.  He does have some calcification along the syndesmosis.  At this point we will continue to observe.  Recommend he start weightbearing as tolerated with a fracture boot.  Start at 25% body weight in progress as he tolerates.  Once fully weightbearing discontinue the boot and switch to regular shoe wear.  May need to use of crutches during this process.  We discussed physical therapy.  At this point is declined.  He will call if he thinks he needs it.  Plan to see him back in 4-week.  Return to clinic 4, week(s), or sooner as needed for changes.    Re-x-ray on return: Yes.  Left ankle 3 view weightbearing    Mariusz Rivera D.O.    Again, thank you for allowing me to participate in the care of your patient.        Sincerely,        Amrit Rivera, DO

## 2019-05-07 ENCOUNTER — OFFICE VISIT (OUTPATIENT)
Dept: OBGYN CLINIC | Facility: CLINIC | Age: 59
End: 2019-05-07
Payer: COMMERCIAL

## 2019-05-07 VITALS
DIASTOLIC BLOOD PRESSURE: 76 MMHG | HEIGHT: 65.5 IN | HEART RATE: 77 BPM | WEIGHT: 133 LBS | SYSTOLIC BLOOD PRESSURE: 138 MMHG | BODY MASS INDEX: 21.89 KG/M2

## 2019-05-07 DIAGNOSIS — Z01.419 ENCOUNTER FOR GYNECOLOGICAL EXAMINATION WITHOUT ABNORMAL FINDING: Primary | ICD-10-CM

## 2019-05-07 PROBLEM — Z91.89 DES EXPOSURE IN UTERO: Status: ACTIVE | Noted: 2019-05-07

## 2019-05-07 PROCEDURE — 99396 PREV VISIT EST AGE 40-64: CPT | Performed by: OBSTETRICS & GYNECOLOGY

## 2019-05-07 NOTE — PROGRESS NOTES
Janine Reynolds is a 60year old female [id number] No LMP recorded. Patient has had a hysterectomy. who presents for Patient presents with:  Gyn Exam: ANNUAL EXAM   She has no complaints.     OBSTETRICS HISTORY:  OB History     T2    L2    SAB2  TAB0 SURGICAL HISTORY  1988    CONE BIOPSY    • OTHER SURGICAL HISTORY  2008    D&C due to endometrial abnormality   • OTHER SURGICAL HISTORY  1991    HAND SURGERY    • OTHER SURGICAL HISTORY  2004    D&C, TUBALLIGATION    • PORT, INDWELLING, IMP     • RADIATIO Transfusions: Not Asked        Caffeine Concern: Yes          COFFEE 1 CUP/DAILY        Occupational Exposure: Not Asked        Hobby Hazards: Not Asked        Sleep Concern: Not Asked        Stress Concern: Not Asked        Weight Concern: Not Asked breast pain, lumps, or discharge. Neurological:  denies headaches, extremity weakness or numbness. Psychiatric: denies depression or anxiety. Endocrine:   denies excessive thirst or urination.   Heme/Lymph:  denies history of anemia, easy bruising or bl

## 2019-05-14 ENCOUNTER — TELEPHONE (OUTPATIENT)
Dept: HEMATOLOGY/ONCOLOGY | Facility: HOSPITAL | Age: 59
End: 2019-05-14

## 2019-05-14 NOTE — TELEPHONE ENCOUNTER
Matthieu Bautista called to see if the fax regarding Nancy's compression order had came though.  Please advise

## 2019-06-10 ENCOUNTER — TELEPHONE (OUTPATIENT)
Dept: HEMATOLOGY/ONCOLOGY | Facility: HOSPITAL | Age: 59
End: 2019-06-10

## 2019-06-10 ENCOUNTER — APPOINTMENT (OUTPATIENT)
Dept: HEMATOLOGY/ONCOLOGY | Facility: HOSPITAL | Age: 59
End: 2019-06-10
Attending: INTERNAL MEDICINE
Payer: COMMERCIAL

## 2019-06-10 NOTE — TELEPHONE ENCOUNTER
Toxicities:  Aldondronate 70mg po weekly on Saturday/Anastrozole 1mg po daily   Due for labs & 4 month f/u appt with Dr Fidel Hall on 7/22/2019     Lump in throat & heartburn     Lump in throat/heartburn: (For the last 2-3 wks the pt has felt a lump in the bot

## 2019-06-10 NOTE — TELEPHONE ENCOUNTER
Per Dr. Caitlin Jones, pt advised to stop alendronate -- to start zometa. Sarah Force for pt to take prilosec-- she will call if symptoms fail to improve or worsen.

## 2019-06-11 DIAGNOSIS — M85.80 OSTEOPENIA, UNSPECIFIED LOCATION: Primary | ICD-10-CM

## 2019-06-20 ENCOUNTER — TELEPHONE (OUTPATIENT)
Dept: HEMATOLOGY/ONCOLOGY | Facility: HOSPITAL | Age: 59
End: 2019-06-20

## 2019-06-20 NOTE — TELEPHONE ENCOUNTER
Pt calling to report that she has stopped taking alendronate, has been taking prilosec and is still having perisistent heartburn and feels that she has something the back of her throat. Requesting to have Dr. Darren Roach call her in the morning as she is working tonight.

## 2019-06-20 NOTE — TELEPHONE ENCOUNTER
Robert Lazaro called to say that she had spoke with Gogo Christie 10 days ago and was having some problems so they switched meds for her. She was told to call back in 10 days and not much as changed. She was looking to speak with Gogo Christie and go from there.  Please advise

## 2019-06-21 ENCOUNTER — TELEPHONE (OUTPATIENT)
Dept: HEMATOLOGY/ONCOLOGY | Facility: HOSPITAL | Age: 59
End: 2019-06-21

## 2019-06-21 NOTE — TELEPHONE ENCOUNTER
Call to patient - left message   Call returned from patient and pantoprazole BID sent to pharmacy   Patient to update us with symptoms next week

## 2019-06-25 ENCOUNTER — TELEPHONE (OUTPATIENT)
Dept: HEMATOLOGY/ONCOLOGY | Facility: HOSPITAL | Age: 59
End: 2019-06-25

## 2019-06-25 NOTE — TELEPHONE ENCOUNTER
Gary Mckeon called to say that she was put on a new script for about a week now.  She does not feel like its doing anything and just wanted to inform Dr. Sundar Reid and Solomon Clinton

## 2019-06-26 ENCOUNTER — TELEPHONE (OUTPATIENT)
Dept: GASTROENTEROLOGY | Facility: CLINIC | Age: 59
End: 2019-06-26

## 2019-06-26 ENCOUNTER — TELEPHONE (OUTPATIENT)
Dept: HEMATOLOGY/ONCOLOGY | Facility: HOSPITAL | Age: 59
End: 2019-06-26

## 2019-06-26 NOTE — TELEPHONE ENCOUNTER
First available appt for consult is tomorrow otherwise not until 7/11/19. Patient accepted below appt w/ Dr. Gerry Hutchinson.     Future Appointments   Date Time Provider Sven Castro   6/27/2019  5:30 PM Jennifer Werner MD [facility]

## 2019-06-26 NOTE — TELEPHONE ENCOUNTER
Call to [facility]  She will be taking 40 mg pantoprazole   Rx sent  Will arrange for GI evaluation possible EGD

## 2019-06-27 ENCOUNTER — OFFICE VISIT (OUTPATIENT)
Dept: GASTROENTEROLOGY | Facility: CLINIC | Age: 59
End: 2019-06-27
Payer: COMMERCIAL

## 2019-06-27 ENCOUNTER — TELEPHONE (OUTPATIENT)
Dept: GASTROENTEROLOGY | Facility: CLINIC | Age: 59
End: 2019-06-27

## 2019-06-27 VITALS
HEART RATE: 72 BPM | BODY MASS INDEX: 21.67 KG/M2 | SYSTOLIC BLOOD PRESSURE: 110 MMHG | DIASTOLIC BLOOD PRESSURE: 66 MMHG | HEIGHT: 65.5 IN | WEIGHT: 131.63 LBS

## 2019-06-27 DIAGNOSIS — R12 HEARTBURN: Primary | ICD-10-CM

## 2019-06-27 DIAGNOSIS — R12 HEARTBURN: ICD-10-CM

## 2019-06-27 DIAGNOSIS — R13.10 DYSPHAGIA, UNSPECIFIED TYPE: ICD-10-CM

## 2019-06-27 DIAGNOSIS — R13.10 DYSPHAGIA, UNSPECIFIED TYPE: Primary | ICD-10-CM

## 2019-06-27 PROCEDURE — 99244 OFF/OP CNSLTJ NEW/EST MOD 40: CPT | Performed by: INTERNAL MEDICINE

## 2019-06-27 NOTE — H&P
[facility] - Gastroenterology                                                                                                  Clinic History and Physical screening 01/16/2014    DEXA SCAN:  The patient's bone density is within the osteoporotic range.  05/28/2014:   • Other specified disorders of uterus, not elsewhere classified     endometrial abnormality   • Pregnancy 1990, 1991, 1992    x 3 vag vacuum, sp Yes      Alcohol/week: 3.0 oz      Types: 1 Glasses of wine, 4 Standard drinks or equivalent per week      Comment: SOCIALLY    Drug use: No       Medications (Active prior to today's visit):    Current Outpatient Medications:  Pantoprazole Sodium 40 MG Or BID    EGD Consent: I have discussed the risks, benefits, and alternatives to EGD with the patient [who demonstrated understanding], including but not limited to the risks of bleeding, infection, pain, perforation as well as the cardiopulmonary risks of an

## 2019-06-27 NOTE — PATIENT INSTRUCTIONS
1. Schedule upper endoscopy (EGD) with IV/consciou sedation with Dr. Jamia Ruiz [Diagnosis: dysphagia, heartburn]    2.  Take your pantoprazole by mouth twice a day

## 2019-06-27 NOTE — TELEPHONE ENCOUNTER
I will call Magy in Endo tomorrow and Pt back to confirm this with both if its okay to add this at 8:00 Am at [facility] on 7/1/19 per     Scheduled for:  EGD 19418  Provider Name: Hadleydrakeabhishek Keke  Date:  7/1/19  Location:  [facility]  Sedation:  IVCS  Time:  0800 Am /

## 2019-06-28 NOTE — TELEPHONE ENCOUNTER
Spoke to Dayan Traylor at [employer] ([phone]) and she confirmed NO PA is required for EGD. Call reference number: Dayan Traylor 6/28/19 @ [phone]. Entered into Epic.

## 2019-06-28 NOTE — TELEPHONE ENCOUNTER
FYI:Rn's ;  I sent a referral to [facility] today   Does the pt needs a Prior Authorization? Pt has [address]     Called pt to confirm this date and time of her Procedure was good and its good to go on 7/1/19 at Em @0800 and t

## 2019-07-01 ENCOUNTER — HOSPITAL ENCOUNTER (OUTPATIENT)
Facility: HOSPITAL | Age: 59
Setting detail: HOSPITAL OUTPATIENT SURGERY
Discharge: HOME | End: 2019-07-01
Attending: INTERNAL MEDICINE | Admitting: INTERNAL MEDICINE
Payer: COMMERCIAL

## 2019-07-01 ENCOUNTER — TELEPHONE (OUTPATIENT)
Dept: HEMATOLOGY/ONCOLOGY | Facility: HOSPITAL | Age: 59
End: 2019-07-01

## 2019-07-01 VITALS
OXYGEN SATURATION: 98 % | SYSTOLIC BLOOD PRESSURE: 156 MMHG | HEIGHT: 65.5 IN | WEIGHT: 131 LBS | RESPIRATION RATE: 18 BRPM | BODY MASS INDEX: 21.56 KG/M2 | HEART RATE: 60 BPM | DIASTOLIC BLOOD PRESSURE: 79 MMHG

## 2019-07-01 DIAGNOSIS — R12 HEARTBURN: ICD-10-CM

## 2019-07-01 DIAGNOSIS — R13.10 DYSPHAGIA, UNSPECIFIED TYPE: ICD-10-CM

## 2019-07-01 PROCEDURE — 0DB38ZX EXCISION OF LOWER ESOPHAGUS, VIA NATURAL OR ARTIFICIAL OPENING ENDOSCOPIC, DIAGNOSTIC: ICD-10-PCS | Performed by: INTERNAL MEDICINE

## 2019-07-01 PROCEDURE — G0500 MOD SEDAT ENDO SERVICE >5YRS: HCPCS | Performed by: INTERNAL MEDICINE

## 2019-07-01 PROCEDURE — 43239 EGD BIOPSY SINGLE/MULTIPLE: CPT | Performed by: INTERNAL MEDICINE

## 2019-07-01 PROCEDURE — 0DB28ZX EXCISION OF MIDDLE ESOPHAGUS, VIA NATURAL OR ARTIFICIAL OPENING ENDOSCOPIC, DIAGNOSTIC: ICD-10-PCS | Performed by: INTERNAL MEDICINE

## 2019-07-01 PROCEDURE — 0DB18ZX EXCISION OF UPPER ESOPHAGUS, VIA NATURAL OR ARTIFICIAL OPENING ENDOSCOPIC, DIAGNOSTIC: ICD-10-PCS | Performed by: INTERNAL MEDICINE

## 2019-07-01 RX ORDER — MIDAZOLAM HYDROCHLORIDE 1 MG/ML
1 INJECTION INTRAMUSCULAR; INTRAVENOUS EVERY 5 MIN PRN
Status: DISCONTINUED | OUTPATIENT
Start: 2019-07-01 | End: 2019-07-01

## 2019-07-01 RX ORDER — MIDAZOLAM HYDROCHLORIDE 1 MG/ML
INJECTION INTRAMUSCULAR; INTRAVENOUS
Status: DISCONTINUED | OUTPATIENT
Start: 2019-07-01 | End: 2019-07-01

## 2019-07-01 RX ORDER — SODIUM CHLORIDE 0.9 % (FLUSH) 0.9 %
10 SYRINGE (ML) INJECTION AS NEEDED
Status: DISCONTINUED | OUTPATIENT
Start: 2019-07-01 | End: 2019-07-01

## 2019-07-01 RX ORDER — SODIUM CHLORIDE, SODIUM LACTATE, POTASSIUM CHLORIDE, CALCIUM CHLORIDE 600; 310; 30; 20 MG/100ML; MG/100ML; MG/100ML; MG/100ML
INJECTION, SOLUTION INTRAVENOUS CONTINUOUS
Status: DISCONTINUED | OUTPATIENT
Start: 2019-07-01 | End: 2019-07-01

## 2019-07-01 NOTE — TELEPHONE ENCOUNTER
Patient paged back   Reviewed the EGD report from Dr. Allison Doyle  She is aware that the path is pending ?  Wed   Her symptoms may all be related to alendronate and local irritation and not Cordero's

## 2019-07-01 NOTE — OPERATIVE REPORT
Esophagogastroduodenoscopy (EGD) Report    [place]     1960 Age 60year old   PCP Tremayne Naylor MD Endoscopist Christiano Felipe MD     Date of procedure: 19    Procedure: EGD w/ biopsy     Pre-operative diagnosis: heartburn, dysph colored mucosa in the distal esophagus. Multiple cold forceps biopsies were obtained. The esophageal mucosa appeared normal otherwise.  Multiple passes were performed from the upper esophageal sphincter to the squamocolumnar junction without other abnormali

## 2019-07-01 NOTE — H&P
History & Physical Examination    Patient Name: Brandi Pimentel  MRN: [id number]  [facility]: [id number]  YOB: 1960    Diagnosis: dysphagia, heartburn      Medications Prior to Admission:  Pantoprazole Sodium 40 MG Oral Tab EC Take 1 tablet (40 mg tota JUVENTINO DEPO MEDROL T10-12   • Renal disorder     right side ureter and kidney removal    • Urothelial carcinoma ([facility] [place] [address] 3/21/2014    right     Past Surgical History:   Procedure Laterality Date   • BREAST LUMPECTOMY      /EBR/chemo - cytoxan=taxotere   • C EXTREMITIES [ X]      I have discussed the risks and benefits and alternatives of the procedure with the patient/family. They understand and agree to proceed with plan of care. I have reviewed the History and Physical done within the last 30 days.   An

## 2019-07-02 ENCOUNTER — TELEPHONE (OUTPATIENT)
Dept: GASTROENTEROLOGY | Facility: CLINIC | Age: 59
End: 2019-07-02

## 2019-07-02 NOTE — TELEPHONE ENCOUNTER
Called patient to discuss results. No answer. Left voice message.     Too Lentz MD  [facility], [facility] - Gastroenterology  7/2/2019  3:36 PM

## 2019-07-03 NOTE — TELEPHONE ENCOUNTER
Called patient back. Discussed results of biopsies from the esophagus consistent with short segment non-dysplasitc Cordero's esophagus. Discussed the pre-cancerous nature of this though the still very low risk of this becoming cancer.     Discussed recom

## 2019-07-16 ENCOUNTER — OFFICE VISIT (OUTPATIENT)
Dept: OTOLARYNGOLOGY | Facility: CLINIC | Age: 59
End: 2019-07-16
Payer: COMMERCIAL

## 2019-07-16 VITALS
WEIGHT: 131 LBS | SYSTOLIC BLOOD PRESSURE: 118 MMHG | TEMPERATURE: 98 F | DIASTOLIC BLOOD PRESSURE: 74 MMHG | BODY MASS INDEX: 21 KG/M2

## 2019-07-16 DIAGNOSIS — J02.9 PHARYNGITIS, UNSPECIFIED ETIOLOGY: Primary | ICD-10-CM

## 2019-07-16 PROCEDURE — 31575 DIAGNOSTIC LARYNGOSCOPY: CPT | Performed by: OTOLARYNGOLOGY

## 2019-07-16 PROCEDURE — 99213 OFFICE O/P EST LOW 20 MIN: CPT | Performed by: OTOLARYNGOLOGY

## 2019-07-16 NOTE — PROGRESS NOTES
Lynnette Israel is a 60year old female.  Patient presents with:  Throat Problem: pt presents with a feeling something is stuck in throat for 1 month    HPI:   For the last few months she has been experiencing a feeling of irritation and fullness in her throat 1992    x 3 vag vacuum, sp ,    • Problems with swallowing    • Radicular pain     HERPES ZOSTER / MARCAINE DEPO MEDROL T10-12   • Renal disorder     right side ureter and kidney removal    • [facility] 3/21[facility]2014    right      S Right: Normal, Left: Normal.    Ears Normal Inspection - Right: Normal, Left: Normal. Canal - Left: Normal. TM - Right: Normal, Left: Normal.     Procedures:  Endoscopy/Laryngoscopy  Pre-Procedure Care: Verbal consent was obtained.  Procedure/risks were exp

## 2019-07-17 RX ORDER — PANTOPRAZOLE SODIUM 40 MG/1
TABLET, DELAYED RELEASE ORAL
Qty: 60 TABLET | Refills: 0 | Status: SHIPPED | OUTPATIENT
Start: 2019-07-17 | End: 2019-08-13

## 2019-07-21 NOTE — PROGRESS NOTES
HPI   7/22/2019  Tobias Rendon is a 60year old female with a history of right Stage IIA IDC of the right breast and right nephroureterectomy for urothelial carcinoma. She is not aware of a change in either breast or lymphadenopathy.   She has not abdomi 2/4/2008 -  Chemotherapy     TC x 3 cycles; results of OncoType DX recurrence score 6-no further chemo      8/8/2008 Cancer Staged     BRCA I and BRCA II - no mutation      12/7/2015 -  Hormone Therapy     EMH OP IV FLUSH & EMH OP IV FLUSH & EMH OP IV FLU Colonoscopy 2/20/2018 which revealed small 1-5 mm polyps in the cecum and ascending colon as well as pancolonic diverticulosis. Genitourinary: Negative for dysuria, flank pain, hematuria and urgency.         GYN exam 5/7/2019 Dr. Gogo Perez DEXA SCAN:  The patient's bone density is within the osteoporotic range.  2014:   • Other specified disorders of uterus, not elsewhere classified     endometrial abnormality   • Pregnancy , , 1992    x 3 vag vacuum, sp ,    • Prob Transportation needs:        Medical: Not on file        Non-medical: Not on file    Tobacco Use      Smoking status: Former Smoker      Smokeless tobacco: Never Used      Tobacco comment: Quit age 31    Substance and Sexual Activity      Alcohol use: family h/o lymphoma   • Breast Cancer Self         2008   • Breast Cancer Maternal Cousin Female         post menopause   • Breast Cancer Maternal Cousin Female         post menopause         PHYSICAL EXAM:   BP (!) 171/81 (BP Location: Left arm, [place] Neurological: She is alert and oriented to person, place, and time. No cranial nerve deficit. Skin: Skin is warm and dry. No rash noted. No erythema. Psychiatric: She has a normal mood and affect.  Her behavior is normal. Judgment and thought content no Her bone density study 6/29/2018 revealed severe osteopenia and a fracture of her right humerus. She had dysphasia with alendronate 70 mg weekly and required upper endoscopy.   There is a question of goblet cell metaplasia versus Cordero's esophagus patien % 2.5 3.3   Basophils %      % 0.8 1.0   Immature Granulocyte %      % 0.2 0.0   Glucose      70 - 99 mg/dL 91 107 (H)   Sodium      136 - 145 mmol/L 141 142   Potassium      3.5 - 5.1 mmol/L 4.7 4.3   Chloride      98 - 112 mmol/L 109 109 (H)   Carbon Differential considerations for the findings in the distal esophageal biopsy (specimen\"A\") include goblet cell metaplasia of the gastric cardia and Cordero's esophagus. No dysplasia is identified.   Clinical correlation is recommended.     6/29/2018 bone 0.55 - 1.02 mg/dL 1.08 (H) 1.03 (H)   BUN/CREAT Ratio      10.0 - 20.0 20.4 (H) 19.4   CALCIUM      8.5 - 10.1 mg/dL 9.1 8.8   CALCULATED OSMOLALITY      275 - 295 mOsm/kg 295 297 (H)   GFR, Non-      >=60 56 (L) 59 (L)   GFR, -A 0.00 - 1.00 x10(3) uL 0.00     Neutrophils %      % 63.8 62 62   Lymphocytes %      % 25.5 27 29   Monocytes %      % 6.4 6 7   Eosinophils %      % 3.3 4 2   Basophils %      % 1.0 1 1   Immature Granulocyte %      % 0.0     Glucose      70 - 99 mg/dL LEFT BREAST:  No significant suspicious finding.   No significant change has occurred.     =====  CONCLUSION:     BI-RADS CATEGORY:     DIAGNOSTIC CATEGORY 2--BENIGN FINDING:    =====================================  Flexible cystoscopy and bladder barbotag

## 2019-07-22 ENCOUNTER — NURSE ONLY (OUTPATIENT)
Dept: HEMATOLOGY/ONCOLOGY | Facility: HOSPITAL | Age: 59
End: 2019-07-22
Attending: INTERNAL MEDICINE
Payer: COMMERCIAL

## 2019-07-22 VITALS
RESPIRATION RATE: 16 BRPM | OXYGEN SATURATION: 98 % | BODY MASS INDEX: 21.73 KG/M2 | WEIGHT: 132 LBS | SYSTOLIC BLOOD PRESSURE: 171 MMHG | DIASTOLIC BLOOD PRESSURE: 81 MMHG | HEIGHT: 65.5 IN | HEART RATE: 64 BPM | TEMPERATURE: 98 F

## 2019-07-22 DIAGNOSIS — M85.80 OSTEOPENIA DETERMINED BY X-RAY: ICD-10-CM

## 2019-07-22 DIAGNOSIS — R13.14 PHARYNGOESOPHAGEAL DYSPHAGIA: ICD-10-CM

## 2019-07-22 DIAGNOSIS — Z45.2 ENCOUNTER FOR ADJUSTMENT OR MANAGEMENT OF VASCULAR ACCESS DEVICE: Primary | ICD-10-CM

## 2019-07-22 DIAGNOSIS — F32.9 REACTIVE DEPRESSION: ICD-10-CM

## 2019-07-22 DIAGNOSIS — C68.9 UROTHELIAL CARCINOMA (HCC): ICD-10-CM

## 2019-07-22 DIAGNOSIS — C50.811 MALIGNANT NEOPLASM OF OVERLAPPING SITES OF RIGHT BREAST IN FEMALE, ESTROGEN RECEPTOR POSITIVE (HCC): Primary | ICD-10-CM

## 2019-07-22 DIAGNOSIS — Z51.81 ENCOUNTER FOR MONITORING AROMATASE INHIBITOR THERAPY: ICD-10-CM

## 2019-07-22 DIAGNOSIS — Z17.0 MALIGNANT NEOPLASM OF OVERLAPPING SITES OF RIGHT BREAST IN FEMALE, ESTROGEN RECEPTOR POSITIVE (HCC): Primary | ICD-10-CM

## 2019-07-22 DIAGNOSIS — S42.331S CLOSED DISPLACED OBLIQUE FRACTURE OF SHAFT OF RIGHT HUMERUS, SEQUELA: ICD-10-CM

## 2019-07-22 DIAGNOSIS — Z17.0 MALIGNANT NEOPLASM OF OVERLAPPING SITES OF RIGHT BREAST IN FEMALE, ESTROGEN RECEPTOR POSITIVE (HCC): ICD-10-CM

## 2019-07-22 DIAGNOSIS — Z79.811 ENCOUNTER FOR MONITORING AROMATASE INHIBITOR THERAPY: ICD-10-CM

## 2019-07-22 DIAGNOSIS — R12 HEARTBURN: ICD-10-CM

## 2019-07-22 DIAGNOSIS — R92.2 DENSE BREASTS: ICD-10-CM

## 2019-07-22 DIAGNOSIS — C50.811 MALIGNANT NEOPLASM OF OVERLAPPING SITES OF RIGHT BREAST IN FEMALE, ESTROGEN RECEPTOR POSITIVE (HCC): ICD-10-CM

## 2019-07-22 LAB
ALBUMIN SERPL-MCNC: 3.7 G/DL (ref 3.4–5)
ALBUMIN/GLOB SERPL: 1.2 {RATIO} (ref 1–2)
ALP LIVER SERPL-CCNC: 70 U/L (ref 46–118)
ALT SERPL-CCNC: 17 U/L (ref 13–56)
ANION GAP SERPL CALC-SCNC: 4 MMOL/L (ref 0–18)
AST SERPL-CCNC: 15 U/L (ref 15–37)
BASOPHILS # BLD AUTO: 0.04 X10(3) UL (ref 0–0.2)
BASOPHILS NFR BLD AUTO: 0.8 %
BILIRUB SERPL-MCNC: 0.6 MG/DL (ref 0.1–2)
BUN BLD-MCNC: 22 MG/DL (ref 7–18)
BUN/CREAT SERPL: 20.4 (ref 10–20)
CALCIUM BLD-MCNC: 9.1 MG/DL (ref 8.5–10.1)
CHLORIDE SERPL-SCNC: 109 MMOL/L (ref 98–112)
CO2 SERPL-SCNC: 28 MMOL/L (ref 21–32)
CREAT BLD-MCNC: 1.08 MG/DL (ref 0.55–1.02)
DEPRECATED HBV CORE AB SER IA-ACNC: 42.1 NG/ML (ref 18–340)
DEPRECATED RDW RBC AUTO: 44.7 FL (ref 35.1–46.3)
EOSINOPHIL # BLD AUTO: 0.13 X10(3) UL (ref 0–0.7)
EOSINOPHIL NFR BLD AUTO: 2.5 %
ERYTHROCYTE [DISTWIDTH] IN BLOOD BY AUTOMATED COUNT: 12.9 % (ref 11–15)
GLOBULIN PLAS-MCNC: 3 G/DL (ref 2.8–4.4)
GLUCOSE BLD-MCNC: 91 MG/DL (ref 70–99)
HCT VFR BLD AUTO: 38.9 % (ref 35–48)
HGB BLD-MCNC: 12.1 G/DL (ref 12–16)
IMM GRANULOCYTES # BLD AUTO: 0.01 X10(3) UL (ref 0–1)
IMM GRANULOCYTES NFR BLD: 0.2 %
IRON SATURATION: 22 % (ref 15–50)
IRON SERPL-MCNC: 82 UG/DL (ref 50–170)
LYMPHOCYTES # BLD AUTO: 0.97 X10(3) UL (ref 1–4)
LYMPHOCYTES NFR BLD AUTO: 18.7 %
M PROTEIN MFR SERPL ELPH: 6.7 G/DL (ref 6.4–8.2)
MCH RBC QN AUTO: 29.7 PG (ref 26–34)
MCHC RBC AUTO-ENTMCNC: 31.1 G/DL (ref 31–37)
MCV RBC AUTO: 95.6 FL (ref 80–100)
MONOCYTES # BLD AUTO: 0.35 X10(3) UL (ref 0.1–1)
MONOCYTES NFR BLD AUTO: 6.7 %
NEUTROPHILS # BLD AUTO: 3.69 X10 (3) UL (ref 1.5–7.7)
NEUTROPHILS # BLD AUTO: 3.69 X10(3) UL (ref 1.5–7.7)
NEUTROPHILS NFR BLD AUTO: 71.1 %
OSMOLALITY SERPL CALC.SUM OF ELEC: 295 MOSM/KG (ref 275–295)
PATIENT FASTING: NO
PHOSPHATE SERPL-MCNC: 3.2 MG/DL (ref 2.5–4.9)
PLATELET # BLD AUTO: 246 10(3)UL (ref 150–450)
POTASSIUM SERPL-SCNC: 4.7 MMOL/L (ref 3.5–5.1)
RBC # BLD AUTO: 4.07 X10(6)UL (ref 3.8–5.3)
SODIUM SERPL-SCNC: 141 MMOL/L (ref 136–145)
TOTAL IRON BINDING CAPACITY: 373 UG/DL (ref 240–450)
TRANSFERRIN SERPL-MCNC: 250 MG/DL (ref 200–360)
WBC # BLD AUTO: 5.2 X10(3) UL (ref 4–11)

## 2019-07-22 PROCEDURE — 36415 COLL VENOUS BLD VENIPUNCTURE: CPT

## 2019-07-22 PROCEDURE — 83540 ASSAY OF IRON: CPT

## 2019-07-22 PROCEDURE — 84466 ASSAY OF TRANSFERRIN: CPT

## 2019-07-22 PROCEDURE — 84100 ASSAY OF PHOSPHORUS: CPT

## 2019-07-22 PROCEDURE — 99214 OFFICE O/P EST MOD 30 MIN: CPT | Performed by: INTERNAL MEDICINE

## 2019-07-22 PROCEDURE — 85025 COMPLETE CBC W/AUTO DIFF WBC: CPT

## 2019-07-22 PROCEDURE — 80053 COMPREHEN METABOLIC PANEL: CPT

## 2019-07-22 PROCEDURE — 82728 ASSAY OF FERRITIN: CPT

## 2019-07-22 PROCEDURE — 36593 DECLOT VASCULAR DEVICE: CPT

## 2019-07-22 PROCEDURE — A4216 STERILE WATER/SALINE, 10 ML: HCPCS

## 2019-07-22 RX ORDER — HEPARIN SODIUM (PORCINE) LOCK FLUSH IV SOLN 100 UNIT/ML 100 UNIT/ML
5 SOLUTION INTRAVENOUS ONCE
Status: COMPLETED | OUTPATIENT
Start: 2019-07-22 | End: 2019-07-22

## 2019-07-22 RX ORDER — 0.9 % SODIUM CHLORIDE 0.9 %
VIAL (ML) INJECTION
Status: DISCONTINUED
Start: 2019-07-22 | End: 2019-07-22

## 2019-07-22 RX ORDER — HEPARIN SODIUM (PORCINE) LOCK FLUSH IV SOLN 100 UNIT/ML 100 UNIT/ML
5 SOLUTION INTRAVENOUS ONCE
Status: CANCELLED | OUTPATIENT
Start: 2019-09-02

## 2019-07-22 RX ORDER — 0.9 % SODIUM CHLORIDE 0.9 %
10 VIAL (ML) INJECTION ONCE
Status: CANCELLED | OUTPATIENT
Start: 2019-09-02

## 2019-07-22 RX ADMIN — HEPARIN SODIUM (PORCINE) LOCK FLUSH IV SOLN 100 UNIT/ML 500 UNITS: 100 SOLUTION INTRAVENOUS at 15:45:00

## 2019-07-22 NOTE — PATIENT INSTRUCTIONS
Orders for Zometa 4 mg every six months pending insurance problem     Continue healthy diet and weight bearing exercise     Call with need for CT scans

## 2019-07-22 NOTE — PROGRESS NOTES
Patient here for Labs from [facility] followed by MD visit. Unable to get a blood return from [facility]. Port flushed with several saline flushes, patient repositioned without any success.   TPA into Port at 1330, after 2 hours of dwell time, still no blood return f

## 2019-07-26 PROBLEM — M81.0 OSTEOPOROSIS: Status: ACTIVE | Noted: 2019-07-26

## 2019-07-31 ENCOUNTER — TELEPHONE (OUTPATIENT)
Dept: HEMATOLOGY/ONCOLOGY | Facility: HOSPITAL | Age: 59
End: 2019-07-31

## 2019-08-05 ENCOUNTER — OFFICE VISIT (OUTPATIENT)
Dept: HEMATOLOGY/ONCOLOGY | Facility: HOSPITAL | Age: 59
End: 2019-08-05
Attending: INTERNAL MEDICINE
Payer: COMMERCIAL

## 2019-08-05 VITALS
TEMPERATURE: 99 F | SYSTOLIC BLOOD PRESSURE: 117 MMHG | DIASTOLIC BLOOD PRESSURE: 75 MMHG | RESPIRATION RATE: 16 BRPM | OXYGEN SATURATION: 98 % | HEART RATE: 67 BPM

## 2019-08-05 DIAGNOSIS — Z45.2 ENCOUNTER FOR ADJUSTMENT OR MANAGEMENT OF VASCULAR ACCESS DEVICE: Primary | ICD-10-CM

## 2019-08-05 DIAGNOSIS — M81.0 AGE-RELATED OSTEOPOROSIS WITHOUT CURRENT PATHOLOGICAL FRACTURE: ICD-10-CM

## 2019-08-05 PROCEDURE — 96374 THER/PROPH/DIAG INJ IV PUSH: CPT

## 2019-08-05 RX ORDER — HEPARIN SODIUM (PORCINE) LOCK FLUSH IV SOLN 100 UNIT/ML 100 UNIT/ML
5 SOLUTION INTRAVENOUS ONCE
Status: COMPLETED | OUTPATIENT
Start: 2019-08-05 | End: 2019-08-05

## 2019-08-05 RX ORDER — HEPARIN SODIUM (PORCINE) LOCK FLUSH IV SOLN 100 UNIT/ML 100 UNIT/ML
5 SOLUTION INTRAVENOUS ONCE
Status: CANCELLED | OUTPATIENT
Start: 2019-08-19

## 2019-08-05 RX ORDER — HEPARIN SODIUM (PORCINE) LOCK FLUSH IV SOLN 100 UNIT/ML 100 UNIT/ML
SOLUTION INTRAVENOUS
Status: COMPLETED
Start: 2019-08-05 | End: 2019-08-05

## 2019-08-05 RX ORDER — 0.9 % SODIUM CHLORIDE 0.9 %
10 VIAL (ML) INJECTION ONCE
Status: CANCELLED | OUTPATIENT
Start: 2019-08-19

## 2019-08-05 RX ORDER — 0.9 % SODIUM CHLORIDE 0.9 %
VIAL (ML) INJECTION
Status: DISCONTINUED
Start: 2019-08-05 | End: 2019-08-05

## 2019-08-05 RX ADMIN — HEPARIN SODIUM (PORCINE) LOCK FLUSH IV SOLN 100 UNIT/ML 500 UNITS: 100 SOLUTION INTRAVENOUS at 10:35:00

## 2019-08-05 NOTE — PROGRESS NOTES
Pt to infusion area for her first dose of Zometa. Reviewed action of med and potential side effects. Denies any dental problems or upcoming dental work. Pt given Jannette information sheet on Zometa. Port accessed.  Blood return obtained after 150 ml saline b

## 2019-08-13 RX ORDER — PANTOPRAZOLE SODIUM 40 MG/1
TABLET, DELAYED RELEASE ORAL
Qty: 60 TABLET | Refills: 2 | Status: SHIPPED | OUTPATIENT
Start: 2019-08-13 | End: 2020-02-13

## 2019-08-19 NOTE — PROGRESS NOTES
[facility] - Gastroenterology                                                                                                  Clinic Progress Note    Patient pr in female, [facility] 12[facility]   • Multiple polyps of sigmoid colon 9/19/2016   • Osteopenia determined by x-ray 6/16/2014   • Osteoporosis screening 01/16/2014    DEXA SCAN:  The patient's bone density is within the osteoporotic ra • Cancer Other         family h/o lymphoma   • Breast Cancer Self         2008   • Breast Cancer Maternal Cousin Female         post menopause   • Breast Cancer Maternal Cousin Female         post menopause      Social History: Social History    Tobacco carcinoma, right breast cancer, osteopenia, tubal ligation, hysterectomy, prior tobacco use, anxiety, here for follow up    Resolution of symptoms with PPI and noted Cordero's esophagus (short segment non-dysplastic) on EGD suggests GERD as the etiology.

## 2019-08-20 ENCOUNTER — TELEPHONE (OUTPATIENT)
Dept: GASTROENTEROLOGY | Facility: CLINIC | Age: 59
End: 2019-08-20

## 2019-08-20 ENCOUNTER — OFFICE VISIT (OUTPATIENT)
Dept: GASTROENTEROLOGY | Facility: CLINIC | Age: 59
End: 2019-08-20
Payer: COMMERCIAL

## 2019-08-20 VITALS
HEART RATE: 66 BPM | BODY MASS INDEX: 21.73 KG/M2 | SYSTOLIC BLOOD PRESSURE: 133 MMHG | WEIGHT: 132 LBS | DIASTOLIC BLOOD PRESSURE: 85 MMHG | HEIGHT: 65.5 IN

## 2019-08-20 DIAGNOSIS — K22.70 BARRETT'S ESOPHAGUS WITHOUT DYSPLASIA: Primary | ICD-10-CM

## 2019-08-20 PROCEDURE — 99214 OFFICE O/P EST MOD 30 MIN: CPT | Performed by: INTERNAL MEDICINE

## 2019-08-20 NOTE — TELEPHONE ENCOUNTER
GI staff: please place recall for EGD in 1 year     Then close TE    Thanks    Allison Reich MD  [facility], [facility] - Gastroenterology  8/20/2019  10:28 AM

## 2019-09-03 ENCOUNTER — TELEPHONE (OUTPATIENT)
Dept: HEMATOLOGY/ONCOLOGY | Facility: HOSPITAL | Age: 59
End: 2019-09-03

## 2019-09-03 NOTE — TELEPHONE ENCOUNTER
I called Indio Avila to confirm her appointments for 9/4/19. She is scheduled for Lab. & Zometa. She states \" the frequency should be every six months. I just had it in August. Pleas check and let me know. \"

## 2019-09-04 ENCOUNTER — APPOINTMENT (OUTPATIENT)
Dept: HEMATOLOGY/ONCOLOGY | Facility: HOSPITAL | Age: 59
End: 2019-09-04
Attending: INTERNAL MEDICINE
Payer: COMMERCIAL

## 2019-09-13 ENCOUNTER — TELEPHONE (OUTPATIENT)
Dept: HEMATOLOGY/ONCOLOGY | Facility: HOSPITAL | Age: 59
End: 2019-09-13

## 2019-09-13 NOTE — TELEPHONE ENCOUNTER
[facility] calling PANTOPRAZOLE SODIUM 40 MG Oral Tab EC needs prior auth because now being taken twice daily.  tammy

## 2019-09-16 ENCOUNTER — APPOINTMENT (OUTPATIENT)
Dept: HEMATOLOGY/ONCOLOGY | Facility: HOSPITAL | Age: 59
End: 2019-09-16
Attending: INTERNAL MEDICINE
Payer: COMMERCIAL

## 2019-09-23 ENCOUNTER — NURSE ONLY (OUTPATIENT)
Dept: HEMATOLOGY/ONCOLOGY | Facility: HOSPITAL | Age: 59
End: 2019-09-23
Attending: INTERNAL MEDICINE
Payer: COMMERCIAL

## 2019-09-23 DIAGNOSIS — Z45.2 ENCOUNTER FOR ADJUSTMENT OR MANAGEMENT OF VASCULAR ACCESS DEVICE: Primary | ICD-10-CM

## 2019-09-23 PROCEDURE — 96523 IRRIG DRUG DELIVERY DEVICE: CPT

## 2019-09-23 RX ORDER — 0.9 % SODIUM CHLORIDE 0.9 %
VIAL (ML) INJECTION
Status: DISCONTINUED
Start: 2019-09-23 | End: 2019-09-23

## 2019-09-23 RX ORDER — HEPARIN SODIUM (PORCINE) LOCK FLUSH IV SOLN 100 UNIT/ML 100 UNIT/ML
5 SOLUTION INTRAVENOUS ONCE
Status: CANCELLED | OUTPATIENT
Start: 2019-10-14

## 2019-09-23 RX ORDER — 0.9 % SODIUM CHLORIDE 0.9 %
10 VIAL (ML) INJECTION ONCE
Status: CANCELLED | OUTPATIENT
Start: 2019-10-14

## 2019-09-23 RX ORDER — HEPARIN SODIUM (PORCINE) LOCK FLUSH IV SOLN 100 UNIT/ML 100 UNIT/ML
5 SOLUTION INTRAVENOUS ONCE
Status: COMPLETED | OUTPATIENT
Start: 2019-09-23 | End: 2019-09-23

## 2019-09-23 RX ADMIN — HEPARIN SODIUM (PORCINE) LOCK FLUSH IV SOLN 100 UNIT/ML 500 UNITS: 100 SOLUTION INTRAVENOUS at 10:01:00

## 2019-09-23 NOTE — PROGRESS NOTES
Patient arrives for port flush. Reports prior times unable to get blood return even after doing TPA. States however when she came in august it gave great blood return. Port accessed using sterile technique, port flushes well, no blood return noted.  Patient

## 2019-09-24 ENCOUNTER — APPOINTMENT (OUTPATIENT)
Dept: HEMATOLOGY/ONCOLOGY | Facility: HOSPITAL | Age: 59
End: 2019-09-24
Attending: INTERNAL MEDICINE
Payer: COMMERCIAL

## 2019-09-27 PROBLEM — K22.70 BARRETT'S ESOPHAGUS: Status: ACTIVE | Noted: 2019-07-01

## 2019-10-10 RX ORDER — ESCITALOPRAM OXALATE 20 MG/1
TABLET ORAL
Qty: 30 TABLET | Refills: 11 | Status: SHIPPED | OUTPATIENT
Start: 2019-10-10 | End: 2020-10-05

## 2019-11-25 ENCOUNTER — OFFICE VISIT (OUTPATIENT)
Dept: HEMATOLOGY/ONCOLOGY | Facility: HOSPITAL | Age: 59
End: 2019-11-25
Attending: INTERNAL MEDICINE
Payer: COMMERCIAL

## 2019-11-25 VITALS
SYSTOLIC BLOOD PRESSURE: 132 MMHG | HEIGHT: 65.5 IN | DIASTOLIC BLOOD PRESSURE: 88 MMHG | WEIGHT: 135 LBS | TEMPERATURE: 99 F | OXYGEN SATURATION: 100 % | RESPIRATION RATE: 16 BRPM | HEART RATE: 67 BPM | BODY MASS INDEX: 22.22 KG/M2

## 2019-11-25 DIAGNOSIS — Z51.81 ENCOUNTER FOR MONITORING AROMATASE INHIBITOR THERAPY: ICD-10-CM

## 2019-11-25 DIAGNOSIS — Z17.0 MALIGNANT NEOPLASM OF OVERLAPPING SITES OF RIGHT BREAST IN FEMALE, ESTROGEN RECEPTOR POSITIVE (HCC): Primary | ICD-10-CM

## 2019-11-25 DIAGNOSIS — F32.9 REACTIVE DEPRESSION: ICD-10-CM

## 2019-11-25 DIAGNOSIS — M85.80 OSTEOPENIA, UNSPECIFIED LOCATION: ICD-10-CM

## 2019-11-25 DIAGNOSIS — C50.811 MALIGNANT NEOPLASM OF OVERLAPPING SITES OF RIGHT BREAST IN FEMALE, ESTROGEN RECEPTOR POSITIVE (HCC): Primary | ICD-10-CM

## 2019-11-25 DIAGNOSIS — C68.9 UROTHELIAL CARCINOMA (HCC): ICD-10-CM

## 2019-11-25 DIAGNOSIS — M89.8X8 ILIAC BONE PAIN: ICD-10-CM

## 2019-11-25 DIAGNOSIS — Z45.2 ENCOUNTER FOR ADJUSTMENT OR MANAGEMENT OF VASCULAR ACCESS DEVICE: Primary | ICD-10-CM

## 2019-11-25 DIAGNOSIS — M85.80 OSTEOPENIA DETERMINED BY X-RAY: ICD-10-CM

## 2019-11-25 DIAGNOSIS — Z86.2 HISTORY OF ANEMIA: ICD-10-CM

## 2019-11-25 DIAGNOSIS — Z79.811 ENCOUNTER FOR MONITORING AROMATASE INHIBITOR THERAPY: ICD-10-CM

## 2019-11-25 DIAGNOSIS — R92.2 DENSE BREASTS: ICD-10-CM

## 2019-11-25 PROCEDURE — 99214 OFFICE O/P EST MOD 30 MIN: CPT | Performed by: INTERNAL MEDICINE

## 2019-11-25 PROCEDURE — 36415 COLL VENOUS BLD VENIPUNCTURE: CPT

## 2019-11-25 PROCEDURE — 83540 ASSAY OF IRON: CPT

## 2019-11-25 PROCEDURE — 82746 ASSAY OF FOLIC ACID SERUM: CPT

## 2019-11-25 PROCEDURE — 84466 ASSAY OF TRANSFERRIN: CPT

## 2019-11-25 PROCEDURE — 80053 COMPREHEN METABOLIC PANEL: CPT

## 2019-11-25 PROCEDURE — 85025 COMPLETE CBC W/AUTO DIFF WBC: CPT

## 2019-11-25 PROCEDURE — 82607 VITAMIN B-12: CPT

## 2019-11-25 PROCEDURE — 36593 DECLOT VASCULAR DEVICE: CPT

## 2019-11-25 PROCEDURE — A4216 STERILE WATER/SALINE, 10 ML: HCPCS

## 2019-11-25 PROCEDURE — 82728 ASSAY OF FERRITIN: CPT

## 2019-11-25 RX ORDER — HEPARIN SODIUM (PORCINE) LOCK FLUSH IV SOLN 100 UNIT/ML 100 UNIT/ML
5 SOLUTION INTRAVENOUS ONCE
Status: COMPLETED | OUTPATIENT
Start: 2019-11-25 | End: 2019-11-25

## 2019-11-25 RX ORDER — 0.9 % SODIUM CHLORIDE 0.9 %
10 VIAL (ML) INJECTION ONCE
Status: CANCELLED | OUTPATIENT
Start: 2019-12-09

## 2019-11-25 RX ORDER — HEPARIN SODIUM (PORCINE) LOCK FLUSH IV SOLN 100 UNIT/ML 100 UNIT/ML
5 SOLUTION INTRAVENOUS ONCE
Status: CANCELLED | OUTPATIENT
Start: 2019-12-09

## 2019-11-25 RX ORDER — 0.9 % SODIUM CHLORIDE 0.9 %
VIAL (ML) INJECTION
Status: DISCONTINUED
Start: 2019-11-25 | End: 2019-11-25

## 2019-11-25 RX ADMIN — HEPARIN SODIUM (PORCINE) LOCK FLUSH IV SOLN 100 UNIT/ML 500 UNITS: 100 SOLUTION INTRAVENOUS at 12:39:00

## 2019-11-25 NOTE — PATIENT INSTRUCTIONS
Bone scan to help localize a potential bone problem    Mammogram 2/27/2020 of after     Continue anastrozole and the pantoprazole     Call with increasing pain

## 2019-11-25 NOTE — PROGRESS NOTES
HPI   11/25/2019  Faye Conner is a 60year old female with a history of right Stage IIA IDC of the right breast and right nephroureterectomy for urothelial carcinoma. She is not aware of a change in either breast or lymphadenopathy.   She has not abdom Right breast core biopsies-invasive ductal carcinoma.   ER 75%, SD 90%, HER-2/keyla negative; right axillary node FNA-no malignancy       1/4/2008 Cancer Staged     Stage IIA T1b, N1, MX      1/4/2008 Surgery     Right lumpectomy and sentinel lymph node bio HENT: Negative for congestion, dental problem, mouth sores and sinus pressure. Eyes: Positive for visual disturbance. Wears reading glasses   Respiratory: Negative for cough and shortness of breath.     Cardiovascular: Negative for chest pain and • Hydronephrosis    • Lymphedema of arm     Right arm   • Malignant neoplasm of overlapping sites of right breast in female, estrogen receptor positive ([facility] [place] [address] 12/14/2007   • Multiple polyps of sigmoid colon 9/19/2016   • Osteopenia determined by x-ray 6/16/ Marital status:       Spouse name: Not on file      Number of children: Not on file      Years of education: Not on file      Highest education level: Not on file    Occupational History      Not on file    Social Needs      Financial resource s Seat Belt: Not Asked        Self-Exams: Not Asked    Social History Narrative      Not on file    Family History   Problem Relation Age of Onset   • Heart Disorder Father    • Gastro-Intestinal Disorder Mother         diverticulitis   • Genito-Urinar Head (right side): No submental, no submandibular, no preauricular and no posterior auricular adenopathy present. Head (left side): No submental, no submandibular, no preauricular and no posterior auricular adenopathy present.      She has no ce Patient has localized pain over the right posterior iliac crest with intermittent radiation to the right posterior thigh.   She will have a bone scan which will then allow us to target additional studies to rule out metastatic disease and direct symptomatic Prelim Neutrophil Abs      1.50 - 7.70 x10 (3) uL 2.01 3.69 2.50   Neutrophils Absolute      1.50 - 7.70 x10(3) uL 2.01 3.69 2.50   Lymphocytes Absolute      1.00 - 4.00 x10(3) uL 0.93 (L) 0.97 (L) 1.00   Monocytes Absolute      0.10 - 1.00 x10(3) uL 0.21 >=8.7 ng/mL 16.1  18.9   Vitamin B12      193 - 986 pg/mL 439  455   FERRITIN      18.0 - 340.0 ng/mL 41.0 42.1 28.0   PHOSPHORUS      2.5 - 4.9 mg/dL  3.2      7/1/2019 EGD pathology  A.  Distal esophagus; biopsy:   · Squamocolumnar junctional mucosa w Basophils Absolute      0.00 - 0.20 x10(3) uL 0.04 0.04   Immature Granulocyte Absolute      0.00 - 1.00 x10(3) uL 0.01 0.00   Neutrophils %      % 71.1 63.8   Lymphocytes %      % 18.7 25.5   Monocytes %      % 6.7 6.4   Eosinophils %      % 2.5 3.3   Bas 26.0 - 34.0 pg 28.3 30.0 29.6   MCHC      31.0 - 37.0 g/dL 30.6 (L) 32.6 32.8   RDW-SD      35.1 - 46.3 fL 44.4     RDW      11.0 - 15.0 % 13.1 13.9 13.3   Platelet Count      027.8 - 450.0 10(3)uL 237.0 377 204   Prelim Neutrophil Abs      1.50 - 7.70 Iron, Serum      50 - 170 ug/dL 118     Transferrin      200 - 360 mg/dL 267     Iron Bind. Cap.(TIBC)      240 - 450 ug/dL 398     Iron Saturation      15 - 50 % 30     FOLATE (FOLIC ACID), SERUM      >=8.7 ng/mL 18.9     Vitamin B12      193 - 986 pg/mL 4

## 2019-11-25 NOTE — PROGRESS NOTES
Pt here for port flush, labs and then to see oncologist.    Mary Callands with only 2ml blood return when reclined and coughing at best.  Flushed well, repositioned, cough/ deep breathe, no further return  TPA instilled at 1300 - capped/clamped and dressed port sit

## 2019-11-26 PROBLEM — M89.8X8 ILIAC BONE PAIN: Status: ACTIVE | Noted: 2019-11-26

## 2019-11-29 ENCOUNTER — HOSPITAL ENCOUNTER (OUTPATIENT)
Dept: NUCLEAR MEDICINE | Facility: HOSPITAL | Age: 59
Discharge: HOME | End: 2019-11-29
Attending: INTERNAL MEDICINE
Payer: COMMERCIAL

## 2019-11-29 DIAGNOSIS — C68.9 UROTHELIAL CARCINOMA (HCC): ICD-10-CM

## 2019-11-29 DIAGNOSIS — M89.8X8 ILIAC BONE PAIN: ICD-10-CM

## 2019-11-29 DIAGNOSIS — C50.811 MALIGNANT NEOPLASM OF OVERLAPPING SITES OF RIGHT BREAST IN FEMALE, ESTROGEN RECEPTOR POSITIVE (HCC): ICD-10-CM

## 2019-11-29 DIAGNOSIS — Z17.0 MALIGNANT NEOPLASM OF OVERLAPPING SITES OF RIGHT BREAST IN FEMALE, ESTROGEN RECEPTOR POSITIVE (HCC): ICD-10-CM

## 2019-11-29 PROCEDURE — 78306 BONE IMAGING WHOLE BODY: CPT | Performed by: INTERNAL MEDICINE

## 2019-11-29 PROCEDURE — 78320 NM BONE SCAN WITH SPECT  (CPT=78306/78320): CPT | Performed by: INTERNAL MEDICINE

## 2019-12-02 ENCOUNTER — TELEPHONE (OUTPATIENT)
Dept: HEMATOLOGY/ONCOLOGY | Facility: HOSPITAL | Age: 59
End: 2019-12-02

## 2019-12-03 NOTE — TELEPHONE ENCOUNTER
Right buttock pain has improved with heat warm baths and is markedly diminished. Bone scan reviewed and patient called with the results she does have some degenerative arthritis changes but nothing to suggest metastatic disease.

## 2019-12-16 ENCOUNTER — TELEPHONE (OUTPATIENT)
Dept: HEMATOLOGY/ONCOLOGY | Facility: HOSPITAL | Age: 59
End: 2019-12-16

## 2019-12-16 DIAGNOSIS — I89.0 LYMPHEDEMA OF ARM: Primary | ICD-10-CM

## 2019-12-16 DIAGNOSIS — I89.0 LYMPHEDEMA OF RIGHT ARM: ICD-10-CM

## 2019-12-16 NOTE — TELEPHONE ENCOUNTER
Patient forgot to ask for a prescription for compression garments. She states she gets every year.  Thank you

## 2020-01-03 RX ORDER — ANASTROZOLE 1 MG/1
1 TABLET ORAL
Qty: 30 TABLET | Refills: 11 | Status: SHIPPED | OUTPATIENT
Start: 2020-01-03 | End: 2020-12-30

## 2020-02-04 ENCOUNTER — NURSE ONLY (OUTPATIENT)
Dept: HEMATOLOGY/ONCOLOGY | Facility: HOSPITAL | Age: 60
End: 2020-02-04
Attending: INTERNAL MEDICINE
Payer: COMMERCIAL

## 2020-02-04 ENCOUNTER — OFFICE VISIT (OUTPATIENT)
Dept: INTERNAL MEDICINE CLINIC | Facility: CLINIC | Age: 60
End: 2020-02-04
Payer: COMMERCIAL

## 2020-02-04 VITALS
BODY MASS INDEX: 21.79 KG/M2 | HEART RATE: 63 BPM | TEMPERATURE: 98 F | WEIGHT: 137.19 LBS | OXYGEN SATURATION: 99 % | DIASTOLIC BLOOD PRESSURE: 88 MMHG | HEIGHT: 66.5 IN | SYSTOLIC BLOOD PRESSURE: 124 MMHG

## 2020-02-04 VITALS
HEART RATE: 63 BPM | RESPIRATION RATE: 16 BRPM | TEMPERATURE: 98 F | SYSTOLIC BLOOD PRESSURE: 147 MMHG | DIASTOLIC BLOOD PRESSURE: 87 MMHG | OXYGEN SATURATION: 99 %

## 2020-02-04 DIAGNOSIS — D64.9 ANEMIA: Primary | ICD-10-CM

## 2020-02-04 DIAGNOSIS — Z00.00 ROUTINE HEALTH MAINTENANCE: Primary | ICD-10-CM

## 2020-02-04 DIAGNOSIS — D12.5 ADENOMATOUS POLYP OF SIGMOID COLON: ICD-10-CM

## 2020-02-04 DIAGNOSIS — M85.80 OSTEOPENIA DETERMINED BY X-RAY: ICD-10-CM

## 2020-02-04 DIAGNOSIS — C50.811 MALIGNANT NEOPLASM OF OVERLAPPING SITES OF RIGHT BREAST IN FEMALE, ESTROGEN RECEPTOR POSITIVE (HCC): ICD-10-CM

## 2020-02-04 DIAGNOSIS — Z45.2 ENCOUNTER FOR ADJUSTMENT OR MANAGEMENT OF VASCULAR ACCESS DEVICE: Primary | ICD-10-CM

## 2020-02-04 DIAGNOSIS — R53.83 OTHER FATIGUE: ICD-10-CM

## 2020-02-04 DIAGNOSIS — C68.9 UROTHELIAL CARCINOMA (HCC): ICD-10-CM

## 2020-02-04 DIAGNOSIS — M81.0 AGE-RELATED OSTEOPOROSIS WITHOUT CURRENT PATHOLOGICAL FRACTURE: ICD-10-CM

## 2020-02-04 DIAGNOSIS — Z17.0 MALIGNANT NEOPLASM OF OVERLAPPING SITES OF RIGHT BREAST IN FEMALE, ESTROGEN RECEPTOR POSITIVE (HCC): ICD-10-CM

## 2020-02-04 DIAGNOSIS — K63.5 MULTIPLE POLYPS OF SIGMOID COLON: ICD-10-CM

## 2020-02-04 DIAGNOSIS — Z45.2 ENCOUNTER FOR ADJUSTMENT OR MANAGEMENT OF VASCULAR ACCESS DEVICE: ICD-10-CM

## 2020-02-04 LAB
ALBUMIN SERPL-MCNC: 3.6 G/DL (ref 3.4–5)
ALBUMIN/GLOB SERPL: 1.2 {RATIO} (ref 1–2)
ALP LIVER SERPL-CCNC: 49 U/L (ref 50–130)
ALT SERPL-CCNC: 16 U/L (ref 13–56)
ANION GAP SERPL CALC-SCNC: 4 MMOL/L (ref 0–18)
AST SERPL-CCNC: 13 U/L (ref 15–37)
BASOPHILS # BLD AUTO: 0.04 X10(3) UL (ref 0–0.2)
BASOPHILS NFR BLD AUTO: 1 %
BILIRUB SERPL-MCNC: 0.5 MG/DL (ref 0.1–2)
BUN BLD-MCNC: 22 MG/DL (ref 7–18)
BUN/CREAT SERPL: 21.4 (ref 10–20)
CALCIUM BLD-MCNC: 8.5 MG/DL (ref 8.5–10.1)
CHLORIDE SERPL-SCNC: 109 MMOL/L (ref 98–112)
CO2 SERPL-SCNC: 27 MMOL/L (ref 21–32)
CREAT BLD-MCNC: 1.03 MG/DL (ref 0.55–1.02)
DEPRECATED RDW RBC AUTO: 47.8 FL (ref 35.1–46.3)
EOSINOPHIL # BLD AUTO: 0.09 X10(3) UL (ref 0–0.7)
EOSINOPHIL NFR BLD AUTO: 2.3 %
ERYTHROCYTE [DISTWIDTH] IN BLOOD BY AUTOMATED COUNT: 13.7 % (ref 11–15)
GLOBULIN PLAS-MCNC: 3 G/DL (ref 2.8–4.4)
GLUCOSE BLD-MCNC: 99 MG/DL (ref 70–99)
HCT VFR BLD AUTO: 38 % (ref 35–48)
HGB BLD-MCNC: 12.1 G/DL (ref 12–16)
IMM GRANULOCYTES # BLD AUTO: 0.01 X10(3) UL (ref 0–1)
IMM GRANULOCYTES NFR BLD: 0.3 %
IRON SATURATION: 21 % (ref 15–50)
IRON SERPL-MCNC: 75 UG/DL (ref 50–170)
LYMPHOCYTES # BLD AUTO: 0.97 X10(3) UL (ref 1–4)
LYMPHOCYTES NFR BLD AUTO: 24.8 %
M PROTEIN MFR SERPL ELPH: 6.6 G/DL (ref 6.4–8.2)
MCH RBC QN AUTO: 30.2 PG (ref 26–34)
MCHC RBC AUTO-ENTMCNC: 31.8 G/DL (ref 31–37)
MCV RBC AUTO: 94.8 FL (ref 80–100)
MONOCYTES # BLD AUTO: 0.28 X10(3) UL (ref 0.1–1)
MONOCYTES NFR BLD AUTO: 7.2 %
NEUTROPHILS # BLD AUTO: 2.52 X10 (3) UL (ref 1.5–7.7)
NEUTROPHILS # BLD AUTO: 2.52 X10(3) UL (ref 1.5–7.7)
NEUTROPHILS NFR BLD AUTO: 64.4 %
OSMOLALITY SERPL CALC.SUM OF ELEC: 293 MOSM/KG (ref 275–295)
PLATELET # BLD AUTO: 226 10(3)UL (ref 150–450)
POTASSIUM SERPL-SCNC: 4.5 MMOL/L (ref 3.5–5.1)
RBC # BLD AUTO: 4.01 X10(6)UL (ref 3.8–5.3)
SODIUM SERPL-SCNC: 140 MMOL/L (ref 136–145)
TOTAL IRON BINDING CAPACITY: 353 UG/DL (ref 240–450)
TRANSFERRIN SERPL-MCNC: 237 MG/DL (ref 200–360)
WBC # BLD AUTO: 3.9 X10(3) UL (ref 4–11)

## 2020-02-04 PROCEDURE — 96374 THER/PROPH/DIAG INJ IV PUSH: CPT

## 2020-02-04 PROCEDURE — 83540 ASSAY OF IRON: CPT

## 2020-02-04 PROCEDURE — 84466 ASSAY OF TRANSFERRIN: CPT

## 2020-02-04 PROCEDURE — 99386 PREV VISIT NEW AGE 40-64: CPT | Performed by: INTERNAL MEDICINE

## 2020-02-04 PROCEDURE — 36593 DECLOT VASCULAR DEVICE: CPT

## 2020-02-04 PROCEDURE — 85025 COMPLETE CBC W/AUTO DIFF WBC: CPT

## 2020-02-04 PROCEDURE — 80053 COMPREHEN METABOLIC PANEL: CPT

## 2020-02-04 RX ORDER — HEPARIN SODIUM (PORCINE) LOCK FLUSH IV SOLN 100 UNIT/ML 100 UNIT/ML
5 SOLUTION INTRAVENOUS ONCE
Status: CANCELLED | OUTPATIENT
Start: 2020-03-03

## 2020-02-04 RX ORDER — 0.9 % SODIUM CHLORIDE 0.9 %
VIAL (ML) INJECTION
Status: DISCONTINUED
Start: 2020-02-04 | End: 2020-02-04

## 2020-02-04 RX ORDER — 0.9 % SODIUM CHLORIDE 0.9 %
10 VIAL (ML) INJECTION ONCE
Status: CANCELLED | OUTPATIENT
Start: 2020-03-03

## 2020-02-04 RX ORDER — HEPARIN SODIUM (PORCINE) LOCK FLUSH IV SOLN 100 UNIT/ML 100 UNIT/ML
5 SOLUTION INTRAVENOUS ONCE
Status: COMPLETED | OUTPATIENT
Start: 2020-02-04 | End: 2020-02-04

## 2020-02-04 RX ORDER — HEPARIN SODIUM (PORCINE) LOCK FLUSH IV SOLN 100 UNIT/ML 100 UNIT/ML
SOLUTION INTRAVENOUS
Status: DISPENSED
Start: 2020-02-04 | End: 2020-02-05

## 2020-02-04 RX ADMIN — HEPARIN SODIUM (PORCINE) LOCK FLUSH IV SOLN 100 UNIT/ML 500 UNITS: 100 SOLUTION INTRAVENOUS at 12:58:00

## 2020-02-04 NOTE — PROGRESS NOTES
Nancy to infusion for zometa. Calcium level 8.5 - no invasive dental procedure given/planned. Will have routine cleaning and notify dentist of zometa infusion. No blood return from port, flushes easily no resistance.  Sushil Rodriguez denies any discomfort w

## 2020-02-04 NOTE — PROGRESS NOTES
Michael Santacruz is a 63year old female. Patient presents with:  Establish Care: New patient here to establish care, last colonoscopy 02/2018, last mammo 02/2019.       HPI:   Michael Santacruz is a 63year old female who presents for a complete physical exam.   Her osteoporotic range.  2014:   • Other specified disorders of uterus, not elsewhere classified     endometrial abnormality   • Pregnancy , , 1992    x 3 vag vacuum, sp ,    • Problems with swallowing    • Radicular pain     HERPES ZO Social History:   Social History    Tobacco Use      Smoking status: Former Smoker      Smokeless tobacco: Never Used      Tobacco comment: Quit age 31    Alcohol use:  Yes      Alcohol/week: 5.0 standard drinks      Types: 1 Glasses of wine, 4 Standar wears a glove    Urothelial carcinoma ([facility] [address]  dx'ed 12/2013 (presented with gross hematuria); s/p chemo 1/2014-2/2014; s/p R nephroureterectomy and pelvic node dissection at [facility] (Dr. Marquise Frausto).   Followed annually by urology Dr. Marquise Frausto for cysto, CXR, CT abd/

## 2020-02-10 ENCOUNTER — HOSPITAL ENCOUNTER (OUTPATIENT)
Dept: MAMMOGRAPHY | Age: 60
Discharge: HOME | End: 2020-02-10
Attending: INTERNAL MEDICINE
Payer: COMMERCIAL

## 2020-02-10 ENCOUNTER — APPOINTMENT (OUTPATIENT)
Dept: LAB | Age: 60
End: 2020-02-10
Attending: INTERNAL MEDICINE
Payer: COMMERCIAL

## 2020-02-10 DIAGNOSIS — Z85.3 PERSONAL HISTORY OF BREAST CANCER: ICD-10-CM

## 2020-02-10 DIAGNOSIS — M85.80 OSTEOPENIA DETERMINED BY X-RAY: ICD-10-CM

## 2020-02-10 DIAGNOSIS — Z12.39 SCREENING FOR BREAST CANCER: ICD-10-CM

## 2020-02-10 DIAGNOSIS — Z00.00 ROUTINE HEALTH MAINTENANCE: ICD-10-CM

## 2020-02-10 DIAGNOSIS — R92.2 DENSE BREAST TISSUE ON MAMMOGRAM: ICD-10-CM

## 2020-02-10 LAB
CHOLEST SMN-MCNC: 176 MG/DL (ref ?–200)
HDLC SERPL-MCNC: 83 MG/DL (ref 40–59)
LDLC SERPL CALC-MCNC: 85 MG/DL (ref ?–100)
NONHDLC SERPL-MCNC: 93 MG/DL (ref ?–130)
PATIENT FASTING Y/N/NP: YES
TRIGL SERPL-MCNC: 41 MG/DL (ref 30–149)
TSI SER-ACNC: 1.24 MIU/ML (ref 0.36–3.74)
VLDLC SERPL CALC-MCNC: 8 MG/DL (ref 0–30)

## 2020-02-10 PROCEDURE — 36415 COLL VENOUS BLD VENIPUNCTURE: CPT | Performed by: INTERNAL MEDICINE

## 2020-02-10 PROCEDURE — 77067 SCR MAMMO BI INCL CAD: CPT | Performed by: INTERNAL MEDICINE

## 2020-02-10 PROCEDURE — 84443 ASSAY THYROID STIM HORMONE: CPT | Performed by: INTERNAL MEDICINE

## 2020-02-10 PROCEDURE — 77063 BREAST TOMOSYNTHESIS BI: CPT | Performed by: INTERNAL MEDICINE

## 2020-02-10 PROCEDURE — 80061 LIPID PANEL: CPT

## 2020-02-10 PROCEDURE — 82306 VITAMIN D 25 HYDROXY: CPT

## 2020-02-10 NOTE — PROGRESS NOTES
Guy Way M.D.  Adi Reyes M.D.  Krystyna Hussein M.D.    [facility]  Gynecologic Oncology  [facility], 2nd Floor  [address] [place], [place] [address]  Telephone :  [phone]  Facsimile:     [phone]   July 29, 2017    Ha Neumann M.D.  [address] [place] [address] [address]  [address]  [place], [place] [address]    RE:  MRS. SCOTT WILKERSON  [facility] No.:  [id number]  YOB: 1982    Dear Ha:    This is a letter in followup for Mrs. Wilkerson.    As you know, you referred her to me because of an elevated CEA that was obtained when you were evaluating the patient for chronic pelvic pain and the role for surgery.    A repeat CEA in our lab was also elevated at 7.7.  She has been seen by Dr. Wilder Schwartz in Medical Oncology.  He believes that the elevated CEA is most likely related to her smoking and a fatty liver.  I also obtained a vaginal ultrasound that showed two follicles, one in each of the ovaries.  No other abnormalities were noted on the ultrasound.  Likewise, a CT scan of the chest, abdomen and pelvis showed no evidence for primary tumor.  The patient did have a small pleural-   based nodule in the right lower lobe, likely a granuloma, but she will need a repeat in four to six months.    She has also undergone a colonoscopy, which was normal.    At this time, we have advised her to stop smoking and to have a repeat CA-125 performed after that.  I will also arrange for her to have a followup CT scan of the chest in four months.    Thank you very much for the opportunity to care for her.    Sincerely,          Adi Reyes M.D.  Gynecologic Oncology    RCK/bryce    [id number] BPerez 7.17.17  [facility]:[id number]      Mammogram BENIGN HURRAY!

## 2020-02-12 LAB — 25(OH)D3 SERPL-MCNC: 76.4 NG/ML (ref 30–100)

## 2020-02-13 ENCOUNTER — TELEPHONE (OUTPATIENT)
Dept: HEMATOLOGY/ONCOLOGY | Facility: HOSPITAL | Age: 60
End: 2020-02-13

## 2020-02-13 RX ORDER — PANTOPRAZOLE SODIUM 40 MG/1
40 TABLET, DELAYED RELEASE ORAL
COMMUNITY
End: 2021-07-06

## 2020-02-13 RX ORDER — PANTOPRAZOLE SODIUM 40 MG/1
TABLET, DELAYED RELEASE ORAL
Qty: 60 TABLET | Refills: 0 | OUTPATIENT
Start: 2020-02-13

## 2020-02-13 NOTE — TELEPHONE ENCOUNTER
Patient spoke to Chioma regarding pantoprazole, she was looking to get a refill but realized she only takes one a day so she has a whole bottle let. Any questions she said to call her.  Thank you

## 2020-02-13 NOTE — TELEPHONE ENCOUNTER
Spoke with Alexus Chandler, states that she is not taking pantoprazole-- she must have been taking it when she was on the alendronate, which was upsetting her stomach-- she is not on zometa.

## 2020-02-25 ENCOUNTER — APPOINTMENT (OUTPATIENT)
Dept: HEMATOLOGY/ONCOLOGY | Facility: HOSPITAL | Age: 60
End: 2020-02-25
Attending: INTERNAL MEDICINE
Payer: COMMERCIAL

## 2020-03-19 RX ORDER — PANTOPRAZOLE SODIUM 40 MG/1
TABLET, DELAYED RELEASE ORAL
Qty: 60 TABLET | Refills: 0 | OUTPATIENT
Start: 2020-03-19

## 2020-03-23 RX ORDER — PANTOPRAZOLE SODIUM 40 MG/1
TABLET, DELAYED RELEASE ORAL
Qty: 60 TABLET | Refills: 0 | OUTPATIENT
Start: 2020-03-23

## 2020-03-24 ENCOUNTER — TELEPHONE (OUTPATIENT)
Dept: HEMATOLOGY/ONCOLOGY | Facility: HOSPITAL | Age: 60
End: 2020-03-24

## 2020-03-24 ENCOUNTER — TELEPHONE (OUTPATIENT)
Dept: GASTROENTEROLOGY | Facility: CLINIC | Age: 60
End: 2020-03-24

## 2020-03-24 RX ORDER — PANTOPRAZOLE SODIUM 40 MG/1
40 TABLET, DELAYED RELEASE ORAL
Qty: 90 TABLET | Refills: 3 | Status: SHIPPED | OUTPATIENT
Start: 2020-03-24 | End: 2021-02-09

## 2020-03-24 NOTE — TELEPHONE ENCOUNTER
Yes agree with the RN triage advice.     Thank you    Med flavia Bobo MD  [facility], [facility] - Gastroenterology  3/24/2020  11:17 AM

## 2020-03-24 NOTE — TELEPHONE ENCOUNTER
Jaylyn Moore called and asked that she get a call back to go over her pantoprazole script with a nurse. Please give the patient a call back.

## 2020-03-24 NOTE — TELEPHONE ENCOUNTER
Patient contacted and reviewed Dr. Annamarie Wood results message from [facility] 8/20/2019 explained d/t EGD results from 7/1/2019- Cordero's esophagus, she was to remain on PPI indefinetly and repeat EGD 7/2020.  I reviewed in light of COVID-19, her procedure will more than

## 2020-03-24 NOTE — TELEPHONE ENCOUNTER
Spoke with Mat Palm, we had spoke perviously and she was on protonix because the alendronate was causing her to have heart burn. She is no longer on the alendronate, is on zometa, and denies having hearburn/reflux. She did state that Dr. Kennedy had recommended being on the medication as a preventative measure and protect stomach lining and states he would refill this for her-- she will call his office for refill.

## 2020-03-30 ENCOUNTER — APPOINTMENT (OUTPATIENT)
Dept: HEMATOLOGY/ONCOLOGY | Facility: HOSPITAL | Age: 60
End: 2020-03-30
Attending: INTERNAL MEDICINE
Payer: COMMERCIAL

## 2020-05-19 ENCOUNTER — APPOINTMENT (OUTPATIENT)
Dept: HEMATOLOGY/ONCOLOGY | Facility: HOSPITAL | Age: 60
End: 2020-05-19
Attending: INTERNAL MEDICINE
Payer: COMMERCIAL

## 2020-05-29 RX ORDER — HEPARIN SODIUM (PORCINE) LOCK FLUSH IV SOLN 100 UNIT/ML 100 UNIT/ML
5 SOLUTION INTRAVENOUS ONCE
Status: CANCELLED | OUTPATIENT
Start: 2020-05-29

## 2020-05-29 RX ORDER — 0.9 % SODIUM CHLORIDE 0.9 %
10 VIAL (ML) INJECTION ONCE
Status: CANCELLED | OUTPATIENT
Start: 2020-05-29

## 2020-06-01 ENCOUNTER — TELEPHONE (OUTPATIENT)
Dept: HEMATOLOGY/ONCOLOGY | Facility: HOSPITAL | Age: 60
End: 2020-06-01

## 2020-06-01 ENCOUNTER — HOSPITAL ENCOUNTER (OUTPATIENT)
Dept: GENERAL RADIOLOGY | Facility: HOSPITAL | Age: 60
Discharge: HOME | End: 2020-06-01
Attending: INTERNAL MEDICINE
Payer: COMMERCIAL

## 2020-06-01 ENCOUNTER — OFFICE VISIT (OUTPATIENT)
Dept: HEMATOLOGY/ONCOLOGY | Facility: HOSPITAL | Age: 60
End: 2020-06-01
Attending: INTERNAL MEDICINE
Payer: COMMERCIAL

## 2020-06-01 VITALS
SYSTOLIC BLOOD PRESSURE: 157 MMHG | RESPIRATION RATE: 16 BRPM | HEART RATE: 74 BPM | BODY MASS INDEX: 22 KG/M2 | DIASTOLIC BLOOD PRESSURE: 85 MMHG | OXYGEN SATURATION: 100 % | HEIGHT: 65.5 IN | TEMPERATURE: 98 F

## 2020-06-01 DIAGNOSIS — C50.811 MALIGNANT NEOPLASM OF OVERLAPPING SITES OF RIGHT BREAST IN FEMALE, ESTROGEN RECEPTOR POSITIVE (HCC): ICD-10-CM

## 2020-06-01 DIAGNOSIS — C68.9 UROTHELIAL CARCINOMA (HCC): ICD-10-CM

## 2020-06-01 DIAGNOSIS — Z79.811 ENCOUNTER FOR MONITORING AROMATASE INHIBITOR THERAPY: ICD-10-CM

## 2020-06-01 DIAGNOSIS — Z17.0 MALIGNANT NEOPLASM OF OVERLAPPING SITES OF RIGHT BREAST IN FEMALE, ESTROGEN RECEPTOR POSITIVE (HCC): ICD-10-CM

## 2020-06-01 DIAGNOSIS — Z51.81 ENCOUNTER FOR MONITORING AROMATASE INHIBITOR THERAPY: ICD-10-CM

## 2020-06-01 DIAGNOSIS — Z00.00 ROUTINE HEALTH MAINTENANCE: ICD-10-CM

## 2020-06-01 DIAGNOSIS — C50.811 MALIGNANT NEOPLASM OF OVERLAPPING SITES OF RIGHT BREAST IN FEMALE, ESTROGEN RECEPTOR POSITIVE (HCC): Primary | ICD-10-CM

## 2020-06-01 DIAGNOSIS — M85.80 OSTEOPENIA DETERMINED BY X-RAY: ICD-10-CM

## 2020-06-01 DIAGNOSIS — M81.0 AGE-RELATED OSTEOPOROSIS WITHOUT CURRENT PATHOLOGICAL FRACTURE: Primary | ICD-10-CM

## 2020-06-01 DIAGNOSIS — Z17.0 MALIGNANT NEOPLASM OF OVERLAPPING SITES OF RIGHT BREAST IN FEMALE, ESTROGEN RECEPTOR POSITIVE (HCC): Primary | ICD-10-CM

## 2020-06-01 DIAGNOSIS — Z45.2 ENCOUNTER FOR ADJUSTMENT AND MANAGEMENT OF VASCULAR ACCESS DEVICE: ICD-10-CM

## 2020-06-01 PROCEDURE — 99214 OFFICE O/P EST MOD 30 MIN: CPT | Performed by: INTERNAL MEDICINE

## 2020-06-01 PROCEDURE — 36598 INJ W/FLUOR EVAL CV DEVICE: CPT | Performed by: INTERNAL MEDICINE

## 2020-06-01 PROCEDURE — 85025 COMPLETE CBC W/AUTO DIFF WBC: CPT

## 2020-06-01 PROCEDURE — 36415 COLL VENOUS BLD VENIPUNCTURE: CPT

## 2020-06-01 PROCEDURE — 96523 IRRIG DRUG DELIVERY DEVICE: CPT

## 2020-06-01 PROCEDURE — 82565 ASSAY OF CREATININE: CPT

## 2020-06-01 PROCEDURE — 82310 ASSAY OF CALCIUM: CPT

## 2020-06-01 PROCEDURE — 80053 COMPREHEN METABOLIC PANEL: CPT

## 2020-06-01 RX ORDER — HEPARIN SODIUM (PORCINE) LOCK FLUSH IV SOLN 100 UNIT/ML 100 UNIT/ML
5 SOLUTION INTRAVENOUS ONCE
Status: CANCELLED | OUTPATIENT
Start: 2020-06-22

## 2020-06-01 RX ORDER — 0.9 % SODIUM CHLORIDE 0.9 %
VIAL (ML) INJECTION
Status: DISPENSED
Start: 2020-06-01 | End: 2020-06-02

## 2020-06-01 RX ORDER — 0.9 % SODIUM CHLORIDE 0.9 %
10 VIAL (ML) INJECTION ONCE
Status: CANCELLED | OUTPATIENT
Start: 2020-06-22

## 2020-06-01 RX ORDER — HEPARIN SODIUM (PORCINE) LOCK FLUSH IV SOLN 100 UNIT/ML 100 UNIT/ML
5 SOLUTION INTRAVENOUS ONCE
Status: COMPLETED | OUTPATIENT
Start: 2020-06-01 | End: 2020-06-01

## 2020-06-01 RX ADMIN — HEPARIN SODIUM (PORCINE) LOCK FLUSH IV SOLN 100 UNIT/ML 500 UNITS: 100 SOLUTION INTRAVENOUS at 15:00:00

## 2020-06-01 NOTE — PROGRESS NOTES
1230: pt here for port flush and labs. Port without blood return once again. Pt does not think TPA will work - hasn't often. I let Dr Lennox [place] office know, spoke with Kelly Alonso.   Left port accessed, dressed, capped/clamped - possible port flow study t

## 2020-06-01 NOTE — PROGRESS NOTES
HPI   6/1/2020  Danielle Shaw is a 63year old female with a history of right Stage IIA IDC of the right breast 2007 and right nephroureterectomy for urothelial carcinoma 2013. She is not aware of a change in either breast or lymphadenopathy.   She has n She had a repeat CT scans of the abdomen and pelvis and chest x-ray at [facility]  9/12/2018 and was seen by Dr. Goldy Cody in follow-up for her urothelial carcinoma. She had a CT urogram with and without contrast on 9/9/2019.   She will be scheduled for [facility] Review of Systems:     Review of Systems   Constitutional: Positive for activity change. Negative for appetite change, chills and unexpected weight change.         Exercising with walking, stationary bike   HENT: Negative for congestion, dental probl • Cholecalciferol (VITAMIN D) 2000 units Oral Cap Take 1 capsule by mouth daily. • Calcium Carbonate-Vitamin D (CALCIUM + D OR) Take 1 tablet by mouth daily.        Allergies:   No Known Allergies    Past Medical History:   Diagnosis Date   • Adenomatou • COLT BIOPSY STEREO NODULE 2 SITE BILAT (CPT=19081/88758)  2000   • NEEDLE BIOPSY LEFT  2000   • OTHER SURGICAL HISTORY  1988    CONE BIOPSY    • OTHER SURGICAL HISTORY  2008    D&C due to endometrial abnormality   • OTHER SURGICAL HISTORY  1991    HAND GUPTA Forced sexual activity: Not on file    Other Topics      Concerns:         Service: Not Asked        Blood Transfusions: Not Asked        Caffeine Concern: Yes          COFFEE 1 CUP/DAILY        Occupational Exposure: Not Asked        Wellington Castleman Pulmonary/Chest: Effort normal and breath sounds normal. No respiratory distress. She has no wheezes. She exhibits no tenderness. Right breast exhibits no inverted nipple, no mass, no nipple discharge, no skin change and no tenderness.  Left breast exhibits Patient does not have recurrent symptoms of her right ypTis N0 M0 urothelial carcinoma following dose dense MVAC and right nephro ureterectomy. Her CT urogram with and without contrast 9/9/2019 and chest x-ray were negative for metastatic disease.   She wi 35.1 - 46.3 fL 45.5  47.8 (H)   RDW      11.0 - 15.0 % 13.1  13.7   Platelet Count      245.0 - 450.0 10(3)uL 230.0  226.0   Prelim Neutrophil Abs      1.50 - 7.70 x10 (3) uL 2.96  2.52   Neutrophils Absolute      1.50 - 7.70 x10(3) uL 2.96  2.52   Lym VLDL      0 - 30 mg/dL  8    NON HDL CHOL      <130 mg/dL  93    Iron, Serum      50 - 170 ug/dL   75   Transferrin      200 - 360 mg/dL   237   Iron Bind. Cap.(TIBC)      240 - 450 ug/dL   353   Iron Saturation      15 - 50 %   21   TSH      0.358 - 3.740 =========================================================  2/26/2019 2D 3D AYLA COLT   BREAST COMPOSITION:           CATEGORY c- The breasts are heterogeneously dense, which may obscure small masses.    FINDINGS:           RIGHT BREAST:  No significant suspi

## 2020-06-02 NOTE — TELEPHONE ENCOUNTER
Spoke with Ebenezer Arnold and let her know that her port flow study was normal. She states she does not mind having blood/getting zometa peripherally and ultimately if the port does not produce blood return she may consider removing it.

## 2020-06-02 NOTE — TELEPHONE ENCOUNTER
Called patient with port study -could only leave a message on the home phone number/mobile phone number

## 2020-06-30 ENCOUNTER — HOSPITAL ENCOUNTER (OUTPATIENT)
Dept: BONE DENSITY | Facility: HOSPITAL | Age: 60
Discharge: HOME | End: 2020-06-30
Attending: INTERNAL MEDICINE
Payer: COMMERCIAL

## 2020-06-30 DIAGNOSIS — M85.80 OSTEOPENIA DETERMINED BY X-RAY: ICD-10-CM

## 2020-06-30 PROCEDURE — 77080 DXA BONE DENSITY AXIAL: CPT | Performed by: INTERNAL MEDICINE

## 2020-07-02 ENCOUNTER — TELEPHONE (OUTPATIENT)
Dept: GASTROENTEROLOGY | Facility: CLINIC | Age: 60
End: 2020-07-02

## 2020-07-02 NOTE — TELEPHONE ENCOUNTER
----- Message from Socorro Ellis RN sent at 2019 10:56 AM CDT -----  Regardin year EGD recall  Per Dr. Jerry Guidry 1 year EGD recall

## 2020-07-16 ENCOUNTER — TELEPHONE (OUTPATIENT)
Dept: GASTROENTEROLOGY | Facility: CLINIC | Age: 60
End: 2020-07-16

## 2020-07-16 DIAGNOSIS — K22.70 BARRETT'S ESOPHAGUS WITHOUT DYSPLASIA: Primary | ICD-10-CM

## 2020-07-16 NOTE — TELEPHONE ENCOUNTER
GI staff:    Please schedule patient for EGD with for Cordero's esophagus without dysplasia with MAC at the [facility] or the hospital    Thanks    Too Lentz MD  [facility], [facility] - Gastroenterology  7/16/2020  2:18 PM

## 2020-07-16 NOTE — TELEPHONE ENCOUNTER
Last Procedure, Date, MD:  EGD with Dr. Leanne Garcia on 7/1/2019  Last Diagnosis: \"Discussed results of biopsies from the esophagus consistent with short segment non-dysplasitc Cordero's esophagus.  Discussed the pre-cancerous nature of this though the still very l

## 2020-07-21 NOTE — TELEPHONE ENCOUNTER
Scheduled for:   EGD 51472  Provider Name:  Dr. Verna Luna  Date:  8/11/20  Location:    [facility]  Sedation:  MAC  Time:  1500 (pt is aware to arrive at 1400)   Prep:  Nothing to eat after midnight   Nothing to drink after 1200 the day of the procedure  Meds/All

## 2020-08-04 ENCOUNTER — APPOINTMENT (OUTPATIENT)
Dept: HEMATOLOGY/ONCOLOGY | Facility: HOSPITAL | Age: 60
End: 2020-08-04
Attending: INTERNAL MEDICINE
Payer: COMMERCIAL

## 2020-08-10 ENCOUNTER — TELEPHONE (OUTPATIENT)
Dept: GASTROENTEROLOGY | Facility: CLINIC | Age: 60
End: 2020-08-10

## 2020-08-10 DIAGNOSIS — K22.70 BARRETT'S ESOPHAGUS WITHOUT DYSPLASIA: Primary | ICD-10-CM

## 2020-08-10 NOTE — TELEPHONE ENCOUNTER
Rescheduled for:   EGD [id number]  Provider Name:  Dr. Mere Tapia  Date:  8/11/20  [facility]  [facility]  Time:  1500 (pt is aware to arrive at 1400)   Prep:  Nothing to eat after midnight               Nothing to drink after 1200 the d

## 2020-08-11 ENCOUNTER — ANESTHESIA EVENT (OUTPATIENT)
Dept: ENDOSCOPY | Facility: HOSPITAL | Age: 60
End: 2020-08-11
Payer: COMMERCIAL

## 2020-08-11 ENCOUNTER — HOSPITAL ENCOUNTER (OUTPATIENT)
Facility: HOSPITAL | Age: 60
Setting detail: HOSPITAL OUTPATIENT SURGERY
Discharge: HOME | End: 2020-08-11
Attending: INTERNAL MEDICINE | Admitting: INTERNAL MEDICINE
Payer: COMMERCIAL

## 2020-08-11 ENCOUNTER — ANESTHESIA (OUTPATIENT)
Dept: ENDOSCOPY | Facility: HOSPITAL | Age: 60
End: 2020-08-11
Payer: COMMERCIAL

## 2020-08-11 DIAGNOSIS — K22.70 BARRETT'S ESOPHAGUS WITHOUT DYSPLASIA: ICD-10-CM

## 2020-08-11 LAB — SARS-COV-2 RNA RESP QL NAA+PROBE: NOT DETECTED

## 2020-08-11 PROCEDURE — 0DB68ZX EXCISION OF STOMACH, VIA NATURAL OR ARTIFICIAL OPENING ENDOSCOPIC, DIAGNOSTIC: ICD-10-PCS | Performed by: INTERNAL MEDICINE

## 2020-08-11 PROCEDURE — 0DB58ZX EXCISION OF ESOPHAGUS, VIA NATURAL OR ARTIFICIAL OPENING ENDOSCOPIC, DIAGNOSTIC: ICD-10-PCS | Performed by: INTERNAL MEDICINE

## 2020-08-11 PROCEDURE — 43239 EGD BIOPSY SINGLE/MULTIPLE: CPT | Performed by: INTERNAL MEDICINE

## 2020-08-11 RX ORDER — LIDOCAINE HYDROCHLORIDE 10 MG/ML
INJECTION, SOLUTION EPIDURAL; INFILTRATION; INTRACAUDAL; PERINEURAL AS NEEDED
Status: DISCONTINUED | OUTPATIENT
Start: 2020-08-11 | End: 2020-08-11 | Stop reason: SURG

## 2020-08-11 RX ORDER — SODIUM CHLORIDE, SODIUM LACTATE, POTASSIUM CHLORIDE, CALCIUM CHLORIDE 600; 310; 30; 20 MG/100ML; MG/100ML; MG/100ML; MG/100ML
INJECTION, SOLUTION INTRAVENOUS CONTINUOUS
Status: DISCONTINUED | OUTPATIENT
Start: 2020-08-11 | End: 2020-08-11

## 2020-08-11 RX ADMIN — SODIUM CHLORIDE, SODIUM LACTATE, POTASSIUM CHLORIDE, CALCIUM CHLORIDE: 600; 310; 30; 20 INJECTION, SOLUTION INTRAVENOUS at 15:59:00

## 2020-08-11 RX ADMIN — LIDOCAINE HYDROCHLORIDE 50 MG: 10 INJECTION, SOLUTION EPIDURAL; INFILTRATION; INTRACAUDAL; PERINEURAL at 15:48:00

## 2020-08-11 NOTE — H&P
History & Physical Examination    Patient Name: Keven Zarate  MRN: [id number]  CSN: [id number]  YOB: 1960    Diagnosis: Cordero's esophagus    Pantoprazole Sodium 40 MG Oral Tab EC, Take 1 tablet (40 mg total) by mouth every morning before br abnormality   • Pregnancy [address]    x 3 vag vacuum, sp ,    • Problems with swallowing    • Radicular pain     HERPES ZOSTER / MARCAINE DEPO MEDROL T10-12   • Reactive depression 2014   • Renal disorder     right side ureter and tobacco: Never Used      Tobacco comment: Quit age 31    Alcohol use:  Yes      Alcohol/week: 5.0 standard drinks      Types: 1 Glasses of wine, 4 Standard drinks or equivalent per week      Frequency: 2-3 times a week      Comment: [address] [place] [address]

## 2020-08-11 NOTE — ANESTHESIA POSTPROCEDURE EVALUATION
Patient: Agnes Patel    Procedure Summary     Date:  08/11/20 Room / Location:  [address] [place] [address] ENDOSCOPY 03 / [address] [place] [address] ENDOSCOPY    Anesthesia Start:  5660 Anesthesia Stop:  2161    Procedure:  ESOPHAGOGASTRODUODENOSCOPY (EGD) (N/A ) Diagnosis:       Cordero's esophagus

## 2020-08-11 NOTE — ANESTHESIA PREPROCEDURE EVALUATION
Anesthesia PreOp Note    HPI:     Patrick Hdez is a 63year old female who presents for preoperative consultation requested by: Parvin Marshall MD    Date of Surgery: 8/11/2020    Procedure(s):  ESOPHAGOGASTRODUODENOSCOPY (EGD)  Indication: Cordero's e esophagus 07/01/2019   • Breast cancer ([facility] [address] DX 12/14/2007    RIGHT BREAST,    • Cervical dysplasia    • Depression    • Herpes zoster    • Hydronephrosis    • Lymphedema of arm     Right arm   • Malignant neoplasm of overlapping sites of right breast in f PORT, INDWELLING, IMP     • RADIATION RIGHT  2008   • TOTAL ABDOMINAL HYSTERECTOMY  2010    BSO       Pantoprazole Sodium 40 MG Oral Tab EC, Take 1 tablet (40 mg total) by mouth every morning before breakfast., Disp: 90 tablet, Rfl: 3, 8/11/2020  Pantopraz Medical: Not on file        Non-medical: Not on file    Tobacco Use      Smoking status: Former Smoker      Smokeless tobacco: Never Used      Tobacco comment: Quit age 31    Substance and Sexual Activity      Alcohol use:  Yes        Alcohol/week: 5.0 s 06/01/2020    .0 06/01/2020     Lab Results   Component Value Date     06/01/2020    K 4.1 06/01/2020     06/01/2020    CO2 28.0 06/01/2020    BUN 19 (H) 06/01/2020    CREATSERUM 1.14 (H) 06/01/2020    CREATSERUM 1.14 (H) 06/01/2020    G

## 2020-08-11 NOTE — OPERATIVE REPORT
Esophagogastroduodenoscopy (EGD) Report    Kalyani Mathews    TAI 1960 Age 63year old   PCP Mohamud Quiñonez MD Endoscopist Sunil Barroso MD     Date of procedure: 20    Procedure: EGD w/ biopsy     Pre-operative diagnosis: Cordero's esophagu Cordero's esophagus (C0M1.0-1.5). The Cordero's segment was examined carefully under high definition white light and narrow band imaging without any nodules or other abnormalities visualized.  Multiple cold forceps biopsies were obtained randomly from the s

## 2020-08-12 VITALS
TEMPERATURE: 98 F | RESPIRATION RATE: 14 BRPM | HEART RATE: 62 BPM | BODY MASS INDEX: 20.89 KG/M2 | WEIGHT: 130 LBS | SYSTOLIC BLOOD PRESSURE: 167 MMHG | HEIGHT: 66 IN | OXYGEN SATURATION: 98 % | DIASTOLIC BLOOD PRESSURE: 82 MMHG

## 2020-08-13 ENCOUNTER — OFFICE VISIT (OUTPATIENT)
Dept: HEMATOLOGY/ONCOLOGY | Facility: HOSPITAL | Age: 60
End: 2020-08-13
Attending: INTERNAL MEDICINE
Payer: COMMERCIAL

## 2020-08-13 ENCOUNTER — TELEPHONE (OUTPATIENT)
Dept: GASTROENTEROLOGY | Facility: CLINIC | Age: 60
End: 2020-08-13

## 2020-08-13 VITALS
SYSTOLIC BLOOD PRESSURE: 142 MMHG | OXYGEN SATURATION: 100 % | HEART RATE: 66 BPM | TEMPERATURE: 98 F | DIASTOLIC BLOOD PRESSURE: 85 MMHG | RESPIRATION RATE: 16 BRPM

## 2020-08-13 DIAGNOSIS — M81.0 AGE-RELATED OSTEOPOROSIS WITHOUT CURRENT PATHOLOGICAL FRACTURE: Primary | ICD-10-CM

## 2020-08-13 DIAGNOSIS — Z45.2 ENCOUNTER FOR ADJUSTMENT OR MANAGEMENT OF VASCULAR ACCESS DEVICE: Primary | ICD-10-CM

## 2020-08-13 DIAGNOSIS — C50.811 MALIGNANT NEOPLASM OF OVERLAPPING SITES OF RIGHT BREAST IN FEMALE, ESTROGEN RECEPTOR POSITIVE (HCC): ICD-10-CM

## 2020-08-13 DIAGNOSIS — C68.9 UROTHELIAL CARCINOMA (HCC): ICD-10-CM

## 2020-08-13 DIAGNOSIS — Z00.00 ROUTINE HEALTH MAINTENANCE: ICD-10-CM

## 2020-08-13 DIAGNOSIS — Z17.0 MALIGNANT NEOPLASM OF OVERLAPPING SITES OF RIGHT BREAST IN FEMALE, ESTROGEN RECEPTOR POSITIVE (HCC): ICD-10-CM

## 2020-08-13 LAB
CALCIUM BLD-MCNC: 8.8 MG/DL (ref 8.5–10.1)
CREAT BLD-MCNC: 1.08 MG/DL (ref 0.55–1.02)

## 2020-08-13 PROCEDURE — 96365 THER/PROPH/DIAG IV INF INIT: CPT

## 2020-08-13 PROCEDURE — 82565 ASSAY OF CREATININE: CPT

## 2020-08-13 PROCEDURE — 82310 ASSAY OF CALCIUM: CPT

## 2020-08-13 RX ORDER — HEPARIN SODIUM (PORCINE) LOCK FLUSH IV SOLN 100 UNIT/ML 100 UNIT/ML
5 SOLUTION INTRAVENOUS ONCE
Status: CANCELLED | OUTPATIENT
Start: 2020-08-13

## 2020-08-13 RX ORDER — 0.9 % SODIUM CHLORIDE 0.9 %
VIAL (ML) INJECTION
Status: DISCONTINUED
Start: 2020-08-13 | End: 2020-08-13

## 2020-08-13 RX ORDER — HEPARIN SODIUM (PORCINE) LOCK FLUSH IV SOLN 100 UNIT/ML 100 UNIT/ML
5 SOLUTION INTRAVENOUS ONCE
Status: COMPLETED | OUTPATIENT
Start: 2020-08-13 | End: 2020-08-13

## 2020-08-13 RX ORDER — 0.9 % SODIUM CHLORIDE 0.9 %
10 VIAL (ML) INJECTION ONCE
Status: CANCELLED | OUTPATIENT
Start: 2020-08-13

## 2020-08-13 RX ORDER — 0.9 % SODIUM CHLORIDE 0.9 %
10 VIAL (ML) INJECTION ONCE
Status: DISCONTINUED | OUTPATIENT
Start: 2020-08-13 | End: 2020-08-13

## 2020-08-13 RX ORDER — HEPARIN SODIUM (PORCINE) LOCK FLUSH IV SOLN 100 UNIT/ML 100 UNIT/ML
SOLUTION INTRAVENOUS
Status: COMPLETED
Start: 2020-08-13 | End: 2020-08-13

## 2020-08-13 RX ADMIN — HEPARIN SODIUM (PORCINE) LOCK FLUSH IV SOLN 100 UNIT/ML 500 UNITS: 100 SOLUTION INTRAVENOUS at 12:03:00

## 2020-08-13 NOTE — PROGRESS NOTES
Nancy to infusion for zometa. No invasive dental procedure given/planned. Will have routine cleaning and notify dentist of zometa infusion. No blood return from port, port study was done and was negative for any issues with port.  Labs drawn from left

## 2020-08-13 NOTE — TELEPHONE ENCOUNTER
Entered into Epic:Recall EGD in 3 year per Dr. Sharon Bernard for Cordero's esophagus. Last EGD done 8/11/2020, next due 8/11/2023. HM updated.

## 2020-08-13 NOTE — TELEPHONE ENCOUNTER
GI staff:    Please recall patient for EGD for Cordero's esophagus in 3 years    Then close encounter    Thanks    Christiano Felipe MD  [facility], [facility] - Gastroenterology  8/13/2020  10:50 AM

## 2020-10-05 RX ORDER — ESCITALOPRAM OXALATE 20 MG/1
TABLET ORAL
Qty: 30 TABLET | Refills: 3 | Status: SHIPPED | OUTPATIENT
Start: 2020-10-05 | End: 2021-03-29

## 2020-11-09 ENCOUNTER — OFFICE VISIT (OUTPATIENT)
Dept: OBGYN CLINIC | Facility: CLINIC | Age: 60
End: 2020-11-09
Payer: COMMERCIAL

## 2020-11-09 VITALS
SYSTOLIC BLOOD PRESSURE: 142 MMHG | BODY MASS INDEX: 21 KG/M2 | WEIGHT: 132 LBS | HEART RATE: 59 BPM | DIASTOLIC BLOOD PRESSURE: 86 MMHG

## 2020-11-09 DIAGNOSIS — Z01.419 ENCOUNTER FOR GYNECOLOGICAL EXAMINATION WITHOUT ABNORMAL FINDING: Primary | ICD-10-CM

## 2020-11-09 PROCEDURE — 99072 ADDL SUPL MATRL&STAF TM PHE: CPT | Performed by: OBSTETRICS & GYNECOLOGY

## 2020-11-09 PROCEDURE — 3077F SYST BP >= 140 MM HG: CPT | Performed by: OBSTETRICS & GYNECOLOGY

## 2020-11-09 PROCEDURE — 3079F DIAST BP 80-89 MM HG: CPT | Performed by: OBSTETRICS & GYNECOLOGY

## 2020-11-09 PROCEDURE — 99396 PREV VISIT EST AGE 40-64: CPT | Performed by: OBSTETRICS & GYNECOLOGY

## 2020-11-09 NOTE — PROGRESS NOTES
Kalyani Rater is a 63year old female [id number] No LMP recorded. Patient has had a hysterectomy. who presents for Patient presents with:  Gyn Exam: Annual  She has no complaints. Had cystoscopy this year and is due for a colonoscopy in 2021.     OBSTETRICS HI Performed by Al Carmichael MD at [address] [place] [address] ENDOSCOPY   • ESOPHAGOGASTRODUODENOSCOPY (EGD) N/A 7/1/2019    Performed by Al Carmichael MD at [address] [place] [address] Right 11/28/2018    Performed by Deann Tuttle, Talks on phone: Not on file        Gets together: Not on file        Attends [employer] service: Not on file        Active member of club or organization: Not on file        Attends meetings of clubs or organizations: Not on file        Relationship status: Carbonate-Vitamin D (CALCIUM + D OR), Take 1 tablet by mouth daily. , Disp: , Rfl:   •  Pantoprazole Sodium 40 MG Oral Tab EC, Take 1 tablet (40 mg total) by mouth every morning before breakfast. (Patient not taking: Reported on 11/9/2020 ), Disp: 90 tablet hair distribution, and no lesions  Urethral Meatus:  normal in size, location, without lesions and prolapse  Bladder:  No fullness, masses or tenderness  Vagina:  Normal appearance without lesions, no abnormal discharge,cuff intact  Adnexa: normal without

## 2020-11-29 NOTE — PROGRESS NOTES
HPI   11/30/2020  Maisha Smith is a 63year old female with a history of right Stage IIA IDC of the right breast 2007 and right nephroureterectomy for urothelial carcinoma 2013. She is not aware of a change in either breast or lymphadenopathy.   She has 50 mg, Adriamycin 50 mg, Cisplatin 115 mg, Neulasta Cycle 1 - 01/06/14; Cycle 2 01/20/14;  Cycle 3 02/03/14, Cycle 4 2/17/14      3/21/2014 Cancer Staged    ypTis N0 Mx     3/21/2014 Surgery    RIGHT NEPHROURETERECTOMY at [facility] by Dr. Priyanka Cullen: tablet 3   • Pantoprazole Sodium 40 MG Oral Tab EC Take 40 mg by mouth every morning before breakfast.     • acetaminophen (TYLENOL CHILDRENS) 160 MG/5ML Oral Suspension Take by mouth.      • anastrozole 1 MG Oral Tab tab Take 1 tablet (1 mg total) by mouth 12/6/2016    Performed by Mabel Forbes MD at [address] [place] [address] ENDOSCOPY   • D & C     • EGD  07/01/2019   • EGD  08/11/2020   • ESOPHAGOGASTRODUODENOSCOPY (EGD) N/A 8/11/2020    Performed by Taylor Greer MD at [place] [address] activity        Days per week: Not on file        Minutes per session: Not on file      Stress: Not on file    Relationships      Social connections        Talks on phone: Not on file        Gets together: Not on file        Attends Church service: Not kg/m²   No complaints of pain today   HENT:   Head: Normocephalic and atraumatic. Nose: Nose normal.   Mouth/Throat: No oropharyngeal exudate. Eyes: Pupils are equal, round, and reactive to light. Conjunctivae and EOM are normal. No scleral icterus. dysplasia  Osteopenia determined by x-ray  Encounter for screening mammogram for malignant neoplasm of breast     Patient does not have clinical evidence of recurrence of her hormone receptor positive, low risk Oncotype Dx pT1b N1 M0 infiltrating ductal ri 39.0 37.4   MCV      80.0 - 100.0 fL 95.8 95.7   MCH      26.0 - 34.0 pg 30.5 30.7   MCHC      31.0 - 37.0 g/dL 31.8 32.1   RDW-SD      35.1 - 46.3 fL 46.8 (H) 45.5   RDW      11.0 - 15.0 % 13.2 13.1   Platelet Count      492.7 - 450.0 10(3)uL 240.0 230.0 significant histopathology  · Diff-quik stain (with reactive positive control) is negative for Helicobacter pylori-like organisms   B.  Esophagus; biopsy:  · Cordero's esophagus  · Negative for dysplasia  ====================================================

## 2020-11-30 ENCOUNTER — NURSE ONLY (OUTPATIENT)
Dept: HEMATOLOGY/ONCOLOGY | Facility: HOSPITAL | Age: 60
End: 2020-11-30
Attending: INTERNAL MEDICINE
Payer: COMMERCIAL

## 2020-11-30 VITALS
DIASTOLIC BLOOD PRESSURE: 89 MMHG | HEART RATE: 55 BPM | OXYGEN SATURATION: 99 % | TEMPERATURE: 98 F | SYSTOLIC BLOOD PRESSURE: 157 MMHG | RESPIRATION RATE: 16 BRPM

## 2020-11-30 DIAGNOSIS — C68.9 UROTHELIAL CARCINOMA (HCC): ICD-10-CM

## 2020-11-30 DIAGNOSIS — Z79.811 ENCOUNTER FOR MONITORING AROMATASE INHIBITOR THERAPY: ICD-10-CM

## 2020-11-30 DIAGNOSIS — K22.70 BARRETT'S ESOPHAGUS WITHOUT DYSPLASIA: ICD-10-CM

## 2020-11-30 DIAGNOSIS — M81.0 AGE-RELATED OSTEOPOROSIS WITHOUT CURRENT PATHOLOGICAL FRACTURE: Primary | ICD-10-CM

## 2020-11-30 DIAGNOSIS — Z12.31 ENCOUNTER FOR SCREENING MAMMOGRAM FOR MALIGNANT NEOPLASM OF BREAST: ICD-10-CM

## 2020-11-30 DIAGNOSIS — I89.0 LYMPHEDEMA OF RIGHT ARM: ICD-10-CM

## 2020-11-30 DIAGNOSIS — Z51.81 ENCOUNTER FOR MONITORING AROMATASE INHIBITOR THERAPY: ICD-10-CM

## 2020-11-30 DIAGNOSIS — M85.80 OSTEOPENIA DETERMINED BY X-RAY: ICD-10-CM

## 2020-11-30 DIAGNOSIS — Z00.00 ROUTINE HEALTH MAINTENANCE: ICD-10-CM

## 2020-11-30 DIAGNOSIS — R92.2 DENSE BREASTS: ICD-10-CM

## 2020-11-30 DIAGNOSIS — Z17.0 MALIGNANT NEOPLASM OF OVERLAPPING SITES OF RIGHT BREAST IN FEMALE, ESTROGEN RECEPTOR POSITIVE (HCC): ICD-10-CM

## 2020-11-30 DIAGNOSIS — Z17.0 MALIGNANT NEOPLASM OF OVERLAPPING SITES OF RIGHT BREAST IN FEMALE, ESTROGEN RECEPTOR POSITIVE (HCC): Primary | ICD-10-CM

## 2020-11-30 DIAGNOSIS — C50.811 MALIGNANT NEOPLASM OF OVERLAPPING SITES OF RIGHT BREAST IN FEMALE, ESTROGEN RECEPTOR POSITIVE (HCC): ICD-10-CM

## 2020-11-30 DIAGNOSIS — C50.811 MALIGNANT NEOPLASM OF OVERLAPPING SITES OF RIGHT BREAST IN FEMALE, ESTROGEN RECEPTOR POSITIVE (HCC): Primary | ICD-10-CM

## 2020-11-30 PROCEDURE — 96523 IRRIG DRUG DELIVERY DEVICE: CPT

## 2020-11-30 PROCEDURE — 36415 COLL VENOUS BLD VENIPUNCTURE: CPT

## 2020-11-30 PROCEDURE — 99214 OFFICE O/P EST MOD 30 MIN: CPT | Performed by: INTERNAL MEDICINE

## 2020-11-30 PROCEDURE — 80053 COMPREHEN METABOLIC PANEL: CPT

## 2020-11-30 PROCEDURE — 85025 COMPLETE CBC W/AUTO DIFF WBC: CPT

## 2020-11-30 RX ORDER — SODIUM CHLORIDE 9 MG/ML
INJECTION INTRAVENOUS
Status: DISCONTINUED
Start: 2020-11-30 | End: 2020-11-30

## 2020-11-30 RX ORDER — 0.9 % SODIUM CHLORIDE 0.9 %
10 VIAL (ML) INJECTION ONCE
Status: CANCELLED | OUTPATIENT
Start: 2020-11-30

## 2020-11-30 RX ORDER — HEPARIN SODIUM (PORCINE) LOCK FLUSH IV SOLN 100 UNIT/ML 100 UNIT/ML
5 SOLUTION INTRAVENOUS ONCE
Status: COMPLETED | OUTPATIENT
Start: 2020-11-30 | End: 2020-11-30

## 2020-11-30 RX ORDER — HEPARIN SODIUM (PORCINE) LOCK FLUSH IV SOLN 100 UNIT/ML 100 UNIT/ML
5 SOLUTION INTRAVENOUS ONCE
Status: CANCELLED | OUTPATIENT
Start: 2020-11-30

## 2020-11-30 RX ADMIN — HEPARIN SODIUM (PORCINE) LOCK FLUSH IV SOLN 100 UNIT/ML 500 UNITS: 100 SOLUTION INTRAVENOUS at 12:06:00

## 2020-12-30 RX ORDER — ANASTROZOLE 1 MG/1
1 TABLET ORAL DAILY
Qty: 30 TABLET | Refills: 0 | Status: SHIPPED | OUTPATIENT
Start: 2020-12-30 | End: 2020-12-30

## 2021-01-27 ENCOUNTER — TELEPHONE (OUTPATIENT)
Dept: GASTROENTEROLOGY | Facility: CLINIC | Age: 61
End: 2021-01-27

## 2021-01-27 NOTE — TELEPHONE ENCOUNTER
----- Message from Cristofer Reddy RN sent at 2/27/2018 10:15 AM CST -----  Regarding: recall CLN 3 years  Recall CLN 3 years

## 2021-02-02 ENCOUNTER — TELEPHONE (OUTPATIENT)
Dept: GASTROENTEROLOGY | Facility: CLINIC | Age: 61
End: 2021-02-02

## 2021-02-02 DIAGNOSIS — Z86.010 PERSONAL HISTORY OF COLONIC POLYPS: Primary | ICD-10-CM

## 2021-02-02 RX ORDER — SODIUM, POTASSIUM,MAG SULFATES 17.5-3.13G
SOLUTION, RECONSTITUTED, ORAL ORAL
Qty: 1 BOTTLE | Refills: 0 | Status: ON HOLD | OUTPATIENT
Start: 2021-02-02 | End: 2021-05-11

## 2021-02-02 NOTE — TELEPHONE ENCOUNTER
The patient's chart has been reviewed. Okay to schedule pt for 3 year CLN recall r/t hx colon polyps with Dr. Sheila Cheatham.      Advise IV Twilight or MAC sedation with split dose Suprep or Colyte/TriLyte or equivalent(eRx) preparation.   -Eligible for NE:

## 2021-02-02 NOTE — TELEPHONE ENCOUNTER
Clarence Minor-    Patient due for a 3 year colon recall. I spoke to patient and have reviewed current medications/allergies. Please advise on orders/prep.     Last Procedure, Date, MD: Colonoscopy with polypectomy on  02/20/18 with Dr. George Stoddard at [address]

## 2021-02-02 NOTE — TELEPHONE ENCOUNTER
Pt calling to schedule repeat 3 year CLN. Pt received recall letter and notification through [employer].  Please call [phone]

## 2021-02-08 NOTE — TELEPHONE ENCOUNTER
Scheduled for:  Colonoscopy [address]  Provider Name:  Dr. Shantell Lord  Date:  05/11/2021  Location:  [facility]  Sedation:  MAC  Time:  3:15pm, (pt is aware to arrive at 2:15pm)    Prep:  suprep  Meds/Allergies Reconciled?:  Mary/APN reviewed.       Diagnosis with

## 2021-02-09 ENCOUNTER — OFFICE VISIT (OUTPATIENT)
Dept: INTERNAL MEDICINE CLINIC | Facility: CLINIC | Age: 61
End: 2021-02-09
Payer: COMMERCIAL

## 2021-02-09 ENCOUNTER — TELEPHONE (OUTPATIENT)
Dept: INTERNAL MEDICINE CLINIC | Facility: CLINIC | Age: 61
End: 2021-02-09

## 2021-02-09 VITALS
DIASTOLIC BLOOD PRESSURE: 80 MMHG | HEIGHT: 66 IN | OXYGEN SATURATION: 99 % | TEMPERATURE: 99 F | SYSTOLIC BLOOD PRESSURE: 140 MMHG | WEIGHT: 137 LBS | BODY MASS INDEX: 22.02 KG/M2 | HEART RATE: 69 BPM

## 2021-02-09 DIAGNOSIS — C68.9 UROTHELIAL CARCINOMA (HCC): Primary | ICD-10-CM

## 2021-02-09 DIAGNOSIS — Z79.899 CURRENT USE OF PROTON PUMP INHIBITOR: ICD-10-CM

## 2021-02-09 DIAGNOSIS — K63.5 MULTIPLE POLYPS OF SIGMOID COLON: ICD-10-CM

## 2021-02-09 DIAGNOSIS — K22.70 BARRETT'S ESOPHAGUS WITHOUT DYSPLASIA: ICD-10-CM

## 2021-02-09 DIAGNOSIS — Z17.0 MALIGNANT NEOPLASM OF OVERLAPPING SITES OF RIGHT BREAST IN FEMALE, ESTROGEN RECEPTOR POSITIVE (HCC): ICD-10-CM

## 2021-02-09 DIAGNOSIS — Z00.00 ROUTINE HEALTH MAINTENANCE: ICD-10-CM

## 2021-02-09 DIAGNOSIS — M85.80 OSTEOPENIA DETERMINED BY X-RAY: ICD-10-CM

## 2021-02-09 DIAGNOSIS — C50.811 MALIGNANT NEOPLASM OF OVERLAPPING SITES OF RIGHT BREAST IN FEMALE, ESTROGEN RECEPTOR POSITIVE (HCC): ICD-10-CM

## 2021-02-09 DIAGNOSIS — F32.9 REACTIVE DEPRESSION: ICD-10-CM

## 2021-02-09 DIAGNOSIS — M81.0 AGE-RELATED OSTEOPOROSIS WITHOUT CURRENT PATHOLOGICAL FRACTURE: ICD-10-CM

## 2021-02-09 PROBLEM — M84.374A FRACTURE, STRESS, METATARSAL, RIGHT, INITIAL ENCOUNTER: Status: RESOLVED | Noted: 2018-09-10 | Resolved: 2021-02-09

## 2021-02-09 PROBLEM — S42.331A CLOSED DISPLACED OBLIQUE FRACTURE OF SHAFT OF RIGHT HUMERUS: Status: RESOLVED | Noted: 2018-11-06 | Resolved: 2021-02-09

## 2021-02-09 PROCEDURE — 3079F DIAST BP 80-89 MM HG: CPT | Performed by: INTERNAL MEDICINE

## 2021-02-09 PROCEDURE — 3077F SYST BP >= 140 MM HG: CPT | Performed by: INTERNAL MEDICINE

## 2021-02-09 PROCEDURE — 99396 PREV VISIT EST AGE 40-64: CPT | Performed by: INTERNAL MEDICINE

## 2021-02-09 PROCEDURE — 3008F BODY MASS INDEX DOCD: CPT | Performed by: INTERNAL MEDICINE

## 2021-02-09 NOTE — PROGRESS NOTES
Donny Evans is a 57year old female. Patient presents with:  Physical      HPI:   Donny Evans is a 57year old female who presents for a complete physical exam.       Exercises regularly; walks 3-5 miles/day. Stays very active; always moving.   Has not be of sigmoid colon 9/19/2016   • Osteopenia determined by x-ray 6/16/2014   • Osteoporosis screening 01/16/2014    DEXA SCAN:  The patient's bone density is within the osteoporotic range.  05/28/2014:   • Other specified disorders of uterus, not elsewhere cla Disorder Mother         diverticulitis   • Genito-Urinary Disorder Sister         urolithiasis   • Diabetes Sister    • Cancer Other         family h/o lymphoma   • Breast Cancer Self         2008   • Breast Cancer Maternal Cousin Female         post menop CBC and CMP recently thru Dr. Mikaela Ramos.     Malignant neoplasm of overlapping sites of right breast in female, estrogen receptor positive ([facility] [address]  -dx'ed 12/14/07; Stage IIA (T1b, N1, Mx).    -s/p R lumpectomy, sentinel LN bx 1/4/08 at [facility] (Dr. Darren Restrepo

## 2021-02-10 ENCOUNTER — OFFICE VISIT (OUTPATIENT)
Dept: HEMATOLOGY/ONCOLOGY | Facility: HOSPITAL | Age: 61
End: 2021-02-10
Attending: INTERNAL MEDICINE
Payer: COMMERCIAL

## 2021-02-10 ENCOUNTER — TELEPHONE (OUTPATIENT)
Dept: INTERNAL MEDICINE CLINIC | Facility: CLINIC | Age: 61
End: 2021-02-10

## 2021-02-10 VITALS
OXYGEN SATURATION: 98 % | DIASTOLIC BLOOD PRESSURE: 86 MMHG | HEART RATE: 68 BPM | WEIGHT: 134 LBS | SYSTOLIC BLOOD PRESSURE: 146 MMHG | RESPIRATION RATE: 16 BRPM | TEMPERATURE: 99 F | HEIGHT: 65.5 IN | BODY MASS INDEX: 22.06 KG/M2

## 2021-02-10 DIAGNOSIS — M81.0 AGE-RELATED OSTEOPOROSIS WITHOUT CURRENT PATHOLOGICAL FRACTURE: ICD-10-CM

## 2021-02-10 DIAGNOSIS — C50.811 MALIGNANT NEOPLASM OF OVERLAPPING SITES OF RIGHT BREAST IN FEMALE, ESTROGEN RECEPTOR POSITIVE (HCC): ICD-10-CM

## 2021-02-10 DIAGNOSIS — R31.9 HEMATURIA, UNSPECIFIED TYPE: Primary | ICD-10-CM

## 2021-02-10 DIAGNOSIS — Z17.0 MALIGNANT NEOPLASM OF OVERLAPPING SITES OF RIGHT BREAST IN FEMALE, ESTROGEN RECEPTOR POSITIVE (HCC): ICD-10-CM

## 2021-02-10 DIAGNOSIS — Z00.00 ROUTINE HEALTH MAINTENANCE: ICD-10-CM

## 2021-02-10 DIAGNOSIS — Z45.2 ENCOUNTER FOR ADJUSTMENT OR MANAGEMENT OF VASCULAR ACCESS DEVICE: Primary | ICD-10-CM

## 2021-02-10 DIAGNOSIS — C68.9 UROTHELIAL CARCINOMA (HCC): ICD-10-CM

## 2021-02-10 DIAGNOSIS — Z79.899 CURRENT USE OF PROTON PUMP INHIBITOR: ICD-10-CM

## 2021-02-10 LAB
CALCIUM BLD-MCNC: 9.4 MG/DL (ref 8.5–10.1)
CHOLEST SMN-MCNC: 204 MG/DL (ref ?–200)
CREAT BLD-MCNC: 1.13 MG/DL
HDLC SERPL-MCNC: 93 MG/DL (ref 40–59)
LDLC SERPL CALC-MCNC: 101 MG/DL (ref ?–100)
NONHDLC SERPL-MCNC: 111 MG/DL (ref ?–130)
TRIGL SERPL-MCNC: 50 MG/DL (ref 30–149)
TSI SER-ACNC: 1.51 MIU/ML (ref 0.36–3.74)
VIT B12 SERPL-MCNC: 381 PG/ML (ref 193–986)
VLDLC SERPL CALC-MCNC: 10 MG/DL (ref 0–30)

## 2021-02-10 PROCEDURE — 80061 LIPID PANEL: CPT

## 2021-02-10 PROCEDURE — 82306 VITAMIN D 25 HYDROXY: CPT

## 2021-02-10 PROCEDURE — 82310 ASSAY OF CALCIUM: CPT

## 2021-02-10 PROCEDURE — 82565 ASSAY OF CREATININE: CPT

## 2021-02-10 PROCEDURE — 82607 VITAMIN B-12: CPT

## 2021-02-10 PROCEDURE — 96365 THER/PROPH/DIAG IV INF INIT: CPT

## 2021-02-10 PROCEDURE — 84443 ASSAY THYROID STIM HORMONE: CPT | Performed by: INTERNAL MEDICINE

## 2021-02-10 RX ORDER — 0.9 % SODIUM CHLORIDE 0.9 %
10 VIAL (ML) INJECTION ONCE
Status: CANCELLED | OUTPATIENT
Start: 2021-02-10

## 2021-02-10 RX ORDER — HEPARIN SODIUM (PORCINE) LOCK FLUSH IV SOLN 100 UNIT/ML 100 UNIT/ML
5 SOLUTION INTRAVENOUS ONCE
Status: CANCELLED | OUTPATIENT
Start: 2021-02-10

## 2021-02-10 RX ORDER — HEPARIN SODIUM (PORCINE) LOCK FLUSH IV SOLN 100 UNIT/ML 100 UNIT/ML
SOLUTION INTRAVENOUS
Status: COMPLETED
Start: 2021-02-10 | End: 2021-02-10

## 2021-02-10 RX ORDER — SODIUM CHLORIDE 9 MG/ML
INJECTION INTRAVENOUS
Status: DISCONTINUED
Start: 2021-02-10 | End: 2021-02-10

## 2021-02-10 RX ORDER — 0.9 % SODIUM CHLORIDE 0.9 %
10 VIAL (ML) INJECTION ONCE
Status: DISCONTINUED | OUTPATIENT
Start: 2021-02-10 | End: 2021-02-10

## 2021-02-10 RX ORDER — HEPARIN SODIUM (PORCINE) LOCK FLUSH IV SOLN 100 UNIT/ML 100 UNIT/ML
5 SOLUTION INTRAVENOUS ONCE
Status: COMPLETED | OUTPATIENT
Start: 2021-02-10 | End: 2021-02-10

## 2021-02-10 RX ADMIN — HEPARIN SODIUM (PORCINE) LOCK FLUSH IV SOLN 100 UNIT/ML 500 UNITS: 100 SOLUTION INTRAVENOUS at 12:52:00

## 2021-02-10 NOTE — TELEPHONE ENCOUNTER
Please call pt   Pt was seen yesterday and forgot to tell Dr Hitesh Alicia that twice over the last few months she had a tiny tinge of blood she went to the washroom  Not sure where it came from  Please call pt with any questions  Just wanted Dr Hitesh Alicia to be

## 2021-02-10 NOTE — TELEPHONE ENCOUNTER
I spoke with patient and she has noticed that she sees a pink tinge on the toilet paper after urinating. She says this happened once 5 or 6 weeks ago and then again about 2 weeks ago.  She wondered if this could be a hemorrhoid although she has no bleeding

## 2021-02-10 NOTE — PROGRESS NOTES
Nancy to infusion for zometa. No invasive dental procedure given/planned. Will have routine cleaning and notify dentist of zometa infusion. PIV started to left [facility], labs drawn.   Labs ok to proceed with Zometa  Zometa infused thru PIV site with no s/s

## 2021-02-10 NOTE — TELEPHONE ENCOUNTER
Was finishing pt's note and realized that I didn't see that her BP was a little high today -- 140/80. I would like her to limit sodium and to get a BP machine and check her BP BID and [facility].

## 2021-02-11 ENCOUNTER — LAB ENCOUNTER (OUTPATIENT)
Dept: LAB | Age: 61
End: 2021-02-11
Attending: INTERNAL MEDICINE
Payer: COMMERCIAL

## 2021-02-11 ENCOUNTER — TELEPHONE (OUTPATIENT)
Dept: INTERNAL MEDICINE CLINIC | Facility: CLINIC | Age: 61
End: 2021-02-11

## 2021-02-11 LAB
BACTERIA UR QL AUTO: NEGATIVE /HPF
BILIRUB UR QL: NEGATIVE
CLARITY UR: CLEAR
COLOR UR: YELLOW
GLUCOSE UR-MCNC: NEGATIVE MG/DL
KETONES UR-MCNC: NEGATIVE MG/DL
LEUKOCYTE ESTERASE UR QL STRIP.AUTO: NEGATIVE
NITRITE UR QL STRIP.AUTO: NEGATIVE
PH UR: 6 [PH] (ref 5–8)
PROT UR-MCNC: NEGATIVE MG/DL
RBC #/AREA URNS AUTO: 1 /HPF
SP GR UR STRIP: 1.02 (ref 1–1.03)
UROBILINOGEN UR STRIP-ACNC: <2
WBC #/AREA URNS AUTO: 0 /HPF

## 2021-02-11 PROCEDURE — 81001 URINALYSIS AUTO W/SCOPE: CPT | Performed by: INTERNAL MEDICINE

## 2021-02-11 NOTE — TELEPHONE ENCOUNTER
Spoke to pt and relayed MD message. Pt verbalized understanding. Pt transferred to  to schedule lab appointment. Pt states she will make appointment for today or as soon as possible.

## 2021-02-12 DIAGNOSIS — N17.9 AKI (ACUTE KIDNEY INJURY) (HCC): Primary | ICD-10-CM

## 2021-02-12 DIAGNOSIS — E53.8 VITAMIN B12 DEFICIENCY: ICD-10-CM

## 2021-02-12 LAB — 25(OH)D3 SERPL-MCNC: 62.1 NG/ML (ref 30–100)

## 2021-02-12 RX ORDER — CHOLECALCIFEROL (VITAMIN D3) 125 MCG
500 CAPSULE ORAL DAILY
Qty: 1 TABLET | Refills: 0 | COMMUNITY
Start: 2021-02-12

## 2021-02-12 NOTE — TELEPHONE ENCOUNTER
Spoke to patient and relayed MD message, patient verbalized understanding.  Patient scheduled for 2/16 at 2:30pm.

## 2021-02-12 NOTE — TELEPHONE ENCOUNTER
Urine is negative for blood. Pt mentioned seeing pinkish blood on toilet paper. Does not appear to be coming from urine, at least on this urinalysis.   Recommend that she be seen in the office for further exam.

## 2021-02-16 ENCOUNTER — OFFICE VISIT (OUTPATIENT)
Dept: INTERNAL MEDICINE CLINIC | Facility: CLINIC | Age: 61
End: 2021-02-16
Payer: COMMERCIAL

## 2021-02-16 VITALS
DIASTOLIC BLOOD PRESSURE: 80 MMHG | OXYGEN SATURATION: 98 % | BODY MASS INDEX: 22.88 KG/M2 | HEIGHT: 65.5 IN | SYSTOLIC BLOOD PRESSURE: 132 MMHG | TEMPERATURE: 98 F | WEIGHT: 139 LBS | HEART RATE: 90 BPM

## 2021-02-16 DIAGNOSIS — R03.0 BORDERLINE HIGH BLOOD PRESSURE: ICD-10-CM

## 2021-02-16 DIAGNOSIS — R31.9 HEMATURIA, UNSPECIFIED TYPE: Primary | ICD-10-CM

## 2021-02-16 PROCEDURE — 3079F DIAST BP 80-89 MM HG: CPT | Performed by: INTERNAL MEDICINE

## 2021-02-16 PROCEDURE — 99214 OFFICE O/P EST MOD 30 MIN: CPT | Performed by: INTERNAL MEDICINE

## 2021-02-16 PROCEDURE — 3008F BODY MASS INDEX DOCD: CPT | Performed by: INTERNAL MEDICINE

## 2021-02-16 PROCEDURE — 3075F SYST BP GE 130 - 139MM HG: CPT | Performed by: INTERNAL MEDICINE

## 2021-02-16 NOTE — PROGRESS NOTES
Ryan Muhammad is a 57year old female. Patient presents with:  Checkup: Patient c/o elevated B/P and some blood tinged in urine that she noticed 5 weeks ago and again 3 weeks ago. Denies pain.      HPI:       Pt forgot to mention at recent check up that she w • Herpes zoster    • Hydronephrosis    • Lymphedema of arm     Right arm   • Malignant neoplasm of overlapping sites of right breast in female, estrogen receptor positive ([facility] [address] 12/14/2007   • Multiple polyps of sigmoid colon 9/19/2016   • Osteopenia dete noninflamed, nonbleeding external hemorrhoid. Stool soft, light brown, OB negative.     ASSESSMENT AND PLAN:     Pink-tinged urine  -UA with small blood but only 1 RBC/HPF  -vaginal speculum exam negative for blood  -rectal exam with brown OB negative stoo

## 2021-02-17 ENCOUNTER — HOSPITAL ENCOUNTER (OUTPATIENT)
Dept: MAMMOGRAPHY | Facility: HOSPITAL | Age: 61
Discharge: HOME | End: 2021-02-17
Attending: INTERNAL MEDICINE
Payer: COMMERCIAL

## 2021-02-17 DIAGNOSIS — R92.2 DENSE BREASTS: ICD-10-CM

## 2021-02-17 DIAGNOSIS — Z12.31 ENCOUNTER FOR SCREENING MAMMOGRAM FOR MALIGNANT NEOPLASM OF BREAST: ICD-10-CM

## 2021-02-17 PROCEDURE — 77067 SCR MAMMO BI INCL CAD: CPT | Performed by: INTERNAL MEDICINE

## 2021-02-17 PROCEDURE — 77063 BREAST TOMOSYNTHESIS BI: CPT | Performed by: INTERNAL MEDICINE

## 2021-03-16 ENCOUNTER — VIRTUAL PHONE E/M (OUTPATIENT)
Dept: INTERNAL MEDICINE CLINIC | Facility: CLINIC | Age: 61
End: 2021-03-16
Payer: COMMERCIAL

## 2021-03-16 DIAGNOSIS — R03.0 BORDERLINE HIGH BLOOD PRESSURE: Primary | ICD-10-CM

## 2021-03-16 PROCEDURE — 99212 OFFICE O/P EST SF 10 MIN: CPT | Performed by: INTERNAL MEDICINE

## 2021-03-16 NOTE — PROGRESS NOTES
Virtual Telephone Check-In    Triston Redd verbally consents to a Virtual/Telephone Check-In visit on 03/16/21. Patient has been referred to the [facility] website at [email or web address] to review the yearly Consent to Treat document.     Patient understand

## 2021-03-29 RX ORDER — ESCITALOPRAM OXALATE 20 MG/1
TABLET ORAL
Qty: 30 TABLET | Refills: 0 | Status: SHIPPED | OUTPATIENT
Start: 2021-03-29 | End: 2021-05-03

## 2021-04-20 ENCOUNTER — VIRTUAL PHONE E/M (OUTPATIENT)
Dept: INTERNAL MEDICINE CLINIC | Facility: CLINIC | Age: 61
End: 2021-04-20
Payer: COMMERCIAL

## 2021-04-20 DIAGNOSIS — I10 ESSENTIAL HYPERTENSION: Primary | ICD-10-CM

## 2021-04-20 PROCEDURE — 99213 OFFICE O/P EST LOW 20 MIN: CPT | Performed by: INTERNAL MEDICINE

## 2021-04-20 RX ORDER — LISINOPRIL 10 MG/1
10 TABLET ORAL DAILY
Qty: 90 TABLET | Refills: 3 | Status: SHIPPED | OUTPATIENT
Start: 2021-04-20 | End: 2022-04-15

## 2021-04-20 NOTE — PROGRESS NOTES
Virtual Telephone Check-In    Patrick Hdez verbally consents to a Virtual/Telephone Check-In visit on 03/16/21. Patient has been referred to the [facility] website at [email or web address] to review the yearly Consent to Treat document.     Patient understand

## 2021-05-03 RX ORDER — ESCITALOPRAM OXALATE 20 MG/1
TABLET ORAL
Qty: 30 TABLET | Refills: 11 | Status: SHIPPED | OUTPATIENT
Start: 2021-05-03 | End: 2022-04-20

## 2021-05-08 ENCOUNTER — LAB ENCOUNTER (OUTPATIENT)
Dept: LAB | Age: 61
End: 2021-05-08
Attending: INTERNAL MEDICINE
Payer: COMMERCIAL

## 2021-05-08 DIAGNOSIS — Z01.818 PRE-OP TESTING: ICD-10-CM

## 2021-05-11 ENCOUNTER — HOSPITAL ENCOUNTER (OUTPATIENT)
Facility: HOSPITAL | Age: 61
Setting detail: HOSPITAL OUTPATIENT SURGERY
Discharge: HOME | End: 2021-05-11
Attending: INTERNAL MEDICINE | Admitting: INTERNAL MEDICINE
Payer: COMMERCIAL

## 2021-05-11 ENCOUNTER — ANESTHESIA EVENT (OUTPATIENT)
Dept: ENDOSCOPY | Facility: HOSPITAL | Age: 61
End: 2021-05-11
Payer: COMMERCIAL

## 2021-05-11 ENCOUNTER — ANESTHESIA (OUTPATIENT)
Dept: ENDOSCOPY | Facility: HOSPITAL | Age: 61
End: 2021-05-11
Payer: COMMERCIAL

## 2021-05-11 VITALS
BODY MASS INDEX: 20.89 KG/M2 | DIASTOLIC BLOOD PRESSURE: 82 MMHG | OXYGEN SATURATION: 100 % | WEIGHT: 130 LBS | SYSTOLIC BLOOD PRESSURE: 150 MMHG | RESPIRATION RATE: 20 BRPM | HEIGHT: 66 IN | HEART RATE: 62 BPM

## 2021-05-11 DIAGNOSIS — K57.90 DIVERTICULOSIS: ICD-10-CM

## 2021-05-11 DIAGNOSIS — Z86.010 PERSONAL HISTORY OF COLONIC POLYPS: ICD-10-CM

## 2021-05-11 DIAGNOSIS — Z01.818 PRE-OP TESTING: Primary | ICD-10-CM

## 2021-05-11 DIAGNOSIS — K63.5 POLYP OF CECUM: ICD-10-CM

## 2021-05-11 DIAGNOSIS — K63.5 POLYP OF TRANSVERSE COLON, UNSPECIFIED TYPE: ICD-10-CM

## 2021-05-11 DIAGNOSIS — Z12.11 SCREENING FOR COLON CANCER: ICD-10-CM

## 2021-05-11 PROCEDURE — 0DBH8ZX EXCISION OF CECUM, VIA NATURAL OR ARTIFICIAL OPENING ENDOSCOPIC, DIAGNOSTIC: ICD-10-PCS | Performed by: INTERNAL MEDICINE

## 2021-05-11 PROCEDURE — 45385 COLONOSCOPY W/LESION REMOVAL: CPT | Performed by: INTERNAL MEDICINE

## 2021-05-11 PROCEDURE — 0DBN8ZX EXCISION OF SIGMOID COLON, VIA NATURAL OR ARTIFICIAL OPENING ENDOSCOPIC, DIAGNOSTIC: ICD-10-PCS | Performed by: INTERNAL MEDICINE

## 2021-05-11 PROCEDURE — 0DBL8ZX EXCISION OF TRANSVERSE COLON, VIA NATURAL OR ARTIFICIAL OPENING ENDOSCOPIC, DIAGNOSTIC: ICD-10-PCS | Performed by: INTERNAL MEDICINE

## 2021-05-11 RX ORDER — NALOXONE HYDROCHLORIDE 0.4 MG/ML
80 INJECTION, SOLUTION INTRAMUSCULAR; INTRAVENOUS; SUBCUTANEOUS AS NEEDED
Status: DISCONTINUED | OUTPATIENT
Start: 2021-05-11 | End: 2021-05-11

## 2021-05-11 RX ORDER — SODIUM CHLORIDE, SODIUM LACTATE, POTASSIUM CHLORIDE, CALCIUM CHLORIDE 600; 310; 30; 20 MG/100ML; MG/100ML; MG/100ML; MG/100ML
INJECTION, SOLUTION INTRAVENOUS CONTINUOUS
Status: DISCONTINUED | OUTPATIENT
Start: 2021-05-11 | End: 2021-05-11

## 2021-05-11 RX ORDER — LIDOCAINE HYDROCHLORIDE 10 MG/ML
INJECTION, SOLUTION EPIDURAL; INFILTRATION; INTRACAUDAL; PERINEURAL AS NEEDED
Status: DISCONTINUED | OUTPATIENT
Start: 2021-05-11 | End: 2021-05-11 | Stop reason: SURG

## 2021-05-11 RX ADMIN — SODIUM CHLORIDE, SODIUM LACTATE, POTASSIUM CHLORIDE, CALCIUM CHLORIDE: 600; 310; 30; 20 INJECTION, SOLUTION INTRAVENOUS at 16:31:00

## 2021-05-11 RX ADMIN — SODIUM CHLORIDE, SODIUM LACTATE, POTASSIUM CHLORIDE, CALCIUM CHLORIDE: 600; 310; 30; 20 INJECTION, SOLUTION INTRAVENOUS at 15:55:00

## 2021-05-11 RX ADMIN — LIDOCAINE HYDROCHLORIDE 50 MG: 10 INJECTION, SOLUTION EPIDURAL; INFILTRATION; INTRACAUDAL; PERINEURAL at 15:56:00

## 2021-05-11 NOTE — H&P
History & Physical Examination    Patient Name: Danielle Shaw  MRN: [id number]  [facility]: [id number]  YOB: 1960    Diagnosis: Colorectal cancer screening, personal history of adenomatous colon polyps        escitalopram 20 MG Oral Tab, TAKE ONE TAB arm   • Malignant neoplasm of overlapping sites of right breast in female, estrogen receptor positive ([facility] [address] 12/14/2007   • Multiple polyps of sigmoid colon 9/19/2016   • Osteopenia determined by x-ray 6/16/2014   • Osteoporosis screening 01/16/2014    DEXA family h/o lymphoma   • Breast Cancer Self         2008   • Breast Cancer Maternal Cousin Female         post menopause   • Breast Cancer Maternal Cousin Female         post menopause     Social History    Tobacco Use      Smoking status: Former Smoker

## 2021-05-11 NOTE — OPERATIVE REPORT
[facility] Endoscopy Report      Date of Procedure:  05/11/21      Preoperative Diagnosis:  1. Colorectal cancer screening  2. Personal history of adenomatous colon polyps        Postoperative Diagnosis:  1. Colon polyps  2.  Pancolonic di mm sessile polyp which was cold snare excised. Was uncertain whether this polyp was retrieved. Inspection of all sites revealed no evidence of ongoing bleeding.  Polypectomy sites in the cecum and proximal ascending colon were evidenced by flat white sca

## 2021-05-11 NOTE — ANESTHESIA PREPROCEDURE EVALUATION
Anesthesia PreOp Note    HPI:     Elena Mckeon is a 57year old female who presents for preoperative consultation requested by: Maribel Pemberton MD    Date of Surgery: 5/11/2021    Procedure(s):  COLONOSCOPY  Indication: Personal history of colonic po 07/01/2019   • Breast cancer ([facility] [address] DX 12/14/2007    RIGHT BREAST,    • Cervical dysplasia    • Depression    • Herpes zoster    • Hydronephrosis    • Lymphedema of arm     Right arm   • Malignant neoplasm of overlapping sites of right breast in female, est ONE TIME DAILY, Disp: 30 tablet, Rfl: 11, 5/11/2021  lisinopril 10 MG Oral Tab, Take 1 tablet (10 mg total) by mouth daily. , Disp: 90 tablet, Rfl: 3, 5/10/2021  Vitamin B-12 500 MCG Oral Tab, Take 1 tablet (500 mcg total) by mouth daily. , Disp: 1 tablet, R Smoking status: Former Smoker      Smokeless tobacco: Never Used      Tobacco comment: Quit age 31    Vaping Use      Vaping Use: Never used    Substance and Sexual Activity      Alcohol use:  Yes        Alcohol/week: 5.0 standard drinks        Types: 1 G 20.98 kg/m². height is 1.676 m (5' 6\") and weight is 59 kg (130 lb). Her blood pressure is 122/79 and her pulse is 67. Her respiration is 15 and oxygen saturation is 98%.     05/11/21  1437   BP: 122/79   Pulse: 67   Resp: 15   SpO2: 98%   Weight: 59 kg

## 2021-05-11 NOTE — ANESTHESIA POSTPROCEDURE EVALUATION
Patient: Virginia Goldberg    Procedure Summary     Date: 05/11/21 Room / Location: [address] [place] [address] ENDOSCOPY 04 / [address] [place] [address] ENDOSCOPY    Anesthesia Start: 8372 Anesthesia Stop:     Procedure: COLONOSCOPY (N/A ) Diagnosis:       Personal history of colonic polyps      (colon kristy

## 2021-05-15 PROBLEM — K63.5 COLON POLYPS: Status: ACTIVE | Noted: 2021-05-15

## 2021-05-19 ENCOUNTER — TELEPHONE (OUTPATIENT)
Dept: GASTROENTEROLOGY | Facility: CLINIC | Age: 61
End: 2021-05-19

## 2021-05-19 NOTE — TELEPHONE ENCOUNTER
Health Maintenance Updated. 3 year colonoscopy recall entered into patient outreach in [place]. Next colonoscopy will be due 5/11/2024 per below message from Dr. Rosario Singh.

## 2021-05-19 NOTE — TELEPHONE ENCOUNTER
----- Message from Kehinde Hawkins MD sent at 5/18/2021  7:03 PM CDT -----  As per ALYSON parameters I left a message on the patient's voicemail. She had #2 subcentimeter adenomatous polyps removed.   The third polyp was subcentimeter, visually benign an

## 2021-05-29 ENCOUNTER — LAB ENCOUNTER (OUTPATIENT)
Dept: LAB | Age: 61
End: 2021-05-29
Attending: INTERNAL MEDICINE
Payer: COMMERCIAL

## 2021-05-29 DIAGNOSIS — I10 ESSENTIAL HYPERTENSION: ICD-10-CM

## 2021-05-29 DIAGNOSIS — N17.9 AKI (ACUTE KIDNEY INJURY) (HCC): ICD-10-CM

## 2021-05-29 PROCEDURE — 36415 COLL VENOUS BLD VENIPUNCTURE: CPT

## 2021-05-29 PROCEDURE — 80048 BASIC METABOLIC PNL TOTAL CA: CPT

## 2021-06-01 ENCOUNTER — PATIENT MESSAGE (OUTPATIENT)
Dept: INTERNAL MEDICINE CLINIC | Facility: CLINIC | Age: 61
End: 2021-06-01

## 2021-06-01 ENCOUNTER — VIRTUAL PHONE E/M (OUTPATIENT)
Dept: INTERNAL MEDICINE CLINIC | Facility: CLINIC | Age: 61
End: 2021-06-01
Payer: COMMERCIAL

## 2021-06-01 DIAGNOSIS — I10 ESSENTIAL HYPERTENSION: Primary | ICD-10-CM

## 2021-06-01 PROCEDURE — 99212 OFFICE O/P EST SF 10 MIN: CPT | Performed by: INTERNAL MEDICINE

## 2021-06-01 NOTE — PROGRESS NOTES
Virtual Telephone Check-In    Igor Gutierrez verbally consents to a Virtual/Telephone Check-In visit on 06/01/21. Patient has been referred to the [facility] website at [email or web address] to review the yearly Consent to Treat document.     Patient understand

## 2021-06-04 RX ORDER — SODIUM CHLORIDE 9 MG/ML
10 INJECTION INTRAVENOUS ONCE
Status: CANCELLED | OUTPATIENT
Start: 2021-06-04

## 2021-06-04 RX ORDER — HEPARIN SODIUM (PORCINE) LOCK FLUSH IV SOLN 100 UNIT/ML 100 UNIT/ML
5 SOLUTION INTRAVENOUS ONCE
Status: CANCELLED | OUTPATIENT
Start: 2021-06-04

## 2021-06-07 ENCOUNTER — OFFICE VISIT (OUTPATIENT)
Dept: HEMATOLOGY/ONCOLOGY | Facility: HOSPITAL | Age: 61
End: 2021-06-07
Attending: INTERNAL MEDICINE
Payer: COMMERCIAL

## 2021-06-07 VITALS
BODY MASS INDEX: 22.06 KG/M2 | OXYGEN SATURATION: 96 % | RESPIRATION RATE: 16 BRPM | HEART RATE: 75 BPM | SYSTOLIC BLOOD PRESSURE: 108 MMHG | TEMPERATURE: 98 F | HEIGHT: 65.5 IN | WEIGHT: 134 LBS | DIASTOLIC BLOOD PRESSURE: 68 MMHG

## 2021-06-07 DIAGNOSIS — Z00.00 ROUTINE HEALTH MAINTENANCE: ICD-10-CM

## 2021-06-07 DIAGNOSIS — C68.9 UROTHELIAL CARCINOMA (HCC): ICD-10-CM

## 2021-06-07 DIAGNOSIS — Z17.0 MALIGNANT NEOPLASM OF OVERLAPPING SITES OF RIGHT BREAST IN FEMALE, ESTROGEN RECEPTOR POSITIVE (HCC): ICD-10-CM

## 2021-06-07 DIAGNOSIS — C50.811 MALIGNANT NEOPLASM OF OVERLAPPING SITES OF RIGHT BREAST IN FEMALE, ESTROGEN RECEPTOR POSITIVE (HCC): Primary | ICD-10-CM

## 2021-06-07 DIAGNOSIS — Z79.811 ENCOUNTER FOR MONITORING AROMATASE INHIBITOR THERAPY: ICD-10-CM

## 2021-06-07 DIAGNOSIS — I89.0 LYMPHEDEMA OF RIGHT ARM: ICD-10-CM

## 2021-06-07 DIAGNOSIS — M85.80 OSTEOPENIA DETERMINED BY X-RAY: ICD-10-CM

## 2021-06-07 DIAGNOSIS — R92.2 DENSE BREASTS: ICD-10-CM

## 2021-06-07 DIAGNOSIS — Z17.0 MALIGNANT NEOPLASM OF OVERLAPPING SITES OF RIGHT BREAST IN FEMALE, ESTROGEN RECEPTOR POSITIVE (HCC): Primary | ICD-10-CM

## 2021-06-07 DIAGNOSIS — Z86.2 HISTORY OF ANEMIA: ICD-10-CM

## 2021-06-07 DIAGNOSIS — K63.5 MULTIPLE POLYPS OF SIGMOID COLON: ICD-10-CM

## 2021-06-07 DIAGNOSIS — Z51.81 ENCOUNTER FOR MONITORING AROMATASE INHIBITOR THERAPY: ICD-10-CM

## 2021-06-07 DIAGNOSIS — M81.0 AGE-RELATED OSTEOPOROSIS WITHOUT CURRENT PATHOLOGICAL FRACTURE: Primary | ICD-10-CM

## 2021-06-07 DIAGNOSIS — C50.811 MALIGNANT NEOPLASM OF OVERLAPPING SITES OF RIGHT BREAST IN FEMALE, ESTROGEN RECEPTOR POSITIVE (HCC): ICD-10-CM

## 2021-06-07 PROCEDURE — 99214 OFFICE O/P EST MOD 30 MIN: CPT | Performed by: INTERNAL MEDICINE

## 2021-06-07 PROCEDURE — 36415 COLL VENOUS BLD VENIPUNCTURE: CPT

## 2021-06-07 PROCEDURE — 96523 IRRIG DRUG DELIVERY DEVICE: CPT

## 2021-06-07 PROCEDURE — 85025 COMPLETE CBC W/AUTO DIFF WBC: CPT

## 2021-06-07 PROCEDURE — 80053 COMPREHEN METABOLIC PANEL: CPT

## 2021-06-07 RX ORDER — HEPARIN SODIUM (PORCINE) LOCK FLUSH IV SOLN 100 UNIT/ML 100 UNIT/ML
5 SOLUTION INTRAVENOUS ONCE
OUTPATIENT
Start: 2021-06-07

## 2021-06-07 RX ORDER — SODIUM CHLORIDE 9 MG/ML
INJECTION INTRAVENOUS
Status: DISCONTINUED
Start: 2021-06-07 | End: 2021-06-07

## 2021-06-07 RX ORDER — HEPARIN SODIUM (PORCINE) LOCK FLUSH IV SOLN 100 UNIT/ML 100 UNIT/ML
5 SOLUTION INTRAVENOUS ONCE
Status: DISCONTINUED | OUTPATIENT
Start: 2021-06-07 | End: 2021-06-07

## 2021-06-07 RX ORDER — SODIUM CHLORIDE 9 MG/ML
10 INJECTION INTRAVENOUS ONCE
OUTPATIENT
Start: 2021-06-07

## 2021-06-07 NOTE — PROGRESS NOTES
HPI   6/7/2021  Nory Coelho is a 57year old female with a history of right Stage IIA IDC of the right breast 2007 and right nephroureterectomy for urothelial carcinoma 2013. She is not aware of a change in either breast or lymphadenopathy.   She has n carcinoma     1/6/2014 - 2/17/2014 Chemotherapy    CHEMOTHERAPY:   DDMVAC q 14 days; Vinblastine 5 mg, Methotrexate 50 mg, Adriamycin 50 mg, Cisplatin 115 mg, Neulasta Cycle 1 - 01/06/14; Cycle 2 01/20/14;  Cycle 3 02/03/14, Cycle 4 2/17/14      3/21/2014 C Current Outpatient Medications   Medication Sig Dispense Refill   • escitalopram 20 MG Oral Tab TAKE ONE TABLET BY MOUTH ONE TIME DAILY 30 tablet 11   • lisinopril 10 MG Oral Tab Take 1 tablet (10 mg total) by mouth daily.  90 tablet 3   • Vitamin B • Urothelial carcinoma ([facility] [address] 3/21/2014    right     Past Surgical History:   Procedure Laterality Date   • BREAST LUMPECTOMY      /EBR/chemo - cytoxan=taxotere   • CHEMOTHERAPY  2008,2013   • COLONOSCOPY N/A 12/6/2016    Procedure: COLONOSCOPY;  Surgeon: Caffeine Concern: Yes          COFFEE 1 CUP/DAILY        Occupational Exposure: Not Asked        Hobby Hazards: Not Asked        Sleep Concern: Not Asked        Stress Concern: Not Asked        Weight Concern: Not Asked        Special Diet: Not Asked 108/68 (BP Location: Left arm, Patient Position: Sitting, Cuff Size: adult)   Pulse 75   Temp 98.3 °F (36.8 °C) (Oral)   Resp 16   Ht 1.664 m (5' 5.5\")   Wt 60.8 kg (134 lb)   SpO2 96%   BMI 21.96 kg/m²   Wt Readings from Last 6 Encounters:  06/07/21 : 60 Neurological:      Mental Status: She is alert and oriented to person, place, and time. Cranial Nerves: No cranial nerve deficit. Psychiatric:         Behavior: Behavior normal.         Thought Content:  Thought content normal.         Judgment: Chris Armstrong sessile serrated adenoma. She plans to continue the lexapro 10 mg daily. She has been enjoying her family in their vacation [address]       RESULTS  Component      Latest Ref Rng & Units 6/7/2021 2/10/2021 11/30/2020   WBC      4.0 - 11.0 x10(3) (SGOT)      15 - 37 U/L 17  21   ALKALINE PHOSPHATASE      50 - 130 U/L 44 (L)  47 (L)   Total Bilirubin      0.1 - 2.0 mg/dL 0.7  0.7   TOTAL PROTEIN      6.4 - 8.2 g/dL 6.7  7.1   Albumin      3.4 - 5.0 g/dL 3.8  3.7   Globulin      2.8 - 4.4 g/dL 2.9  3 esophagus  · Negative for dysplasia  ========================================================  6/30/2020 XR bone densitometry  1. Osteopenia with increased fracture risk.    2.          When compared to baseline the left hip has gone from 0.727 to

## 2021-07-06 RX ORDER — PANTOPRAZOLE SODIUM 40 MG/1
TABLET, DELAYED RELEASE ORAL
Qty: 90 TABLET | Refills: 0 | Status: SHIPPED | OUTPATIENT
Start: 2021-07-06 | End: 2021-10-04

## 2021-07-06 NOTE — TELEPHONE ENCOUNTER
Requested Prescriptions     Pending Prescriptions Disp Refills   • PANTOPRAZOLE SODIUM 40 MG Oral Tab EC [Pharmacy Med Name: Pantoprazole Sodium Ec 40 Mg Tab Auro] 90 tablet 0     Sig: Take 1 tablet (40 mg total) by mouth every morning before breakfast.

## 2021-08-06 ENCOUNTER — HOSPITAL ENCOUNTER (OUTPATIENT)
Dept: MRI IMAGING | Facility: HOSPITAL | Age: 61
Discharge: HOME | End: 2021-08-06
Attending: INTERNAL MEDICINE
Payer: COMMERCIAL

## 2021-08-06 DIAGNOSIS — C50.811 MALIGNANT NEOPLASM OF OVERLAPPING SITES OF RIGHT BREAST IN FEMALE, ESTROGEN RECEPTOR POSITIVE (HCC): ICD-10-CM

## 2021-08-06 DIAGNOSIS — Z17.0 MALIGNANT NEOPLASM OF OVERLAPPING SITES OF RIGHT BREAST IN FEMALE, ESTROGEN RECEPTOR POSITIVE (HCC): ICD-10-CM

## 2021-08-06 LAB — CREAT BLD-MCNC: 1.1 MG/DL

## 2021-08-06 PROCEDURE — 77049 MRI BREAST C-+ W/CAD BI: CPT | Performed by: INTERNAL MEDICINE

## 2021-08-06 PROCEDURE — A9575 INJ GADOTERATE MEGLUMI 0.1ML: HCPCS | Performed by: INTERNAL MEDICINE

## 2021-08-06 PROCEDURE — 82565 ASSAY OF CREATININE: CPT

## 2021-08-10 ENCOUNTER — LAB ENCOUNTER (OUTPATIENT)
Dept: LAB | Age: 61
End: 2021-08-10
Attending: INTERNAL MEDICINE
Payer: COMMERCIAL

## 2021-08-10 DIAGNOSIS — M85.80 OSTEOPENIA DETERMINED BY X-RAY: Primary | ICD-10-CM

## 2021-08-10 DIAGNOSIS — Z45.2 ENCOUNTER FOR ADJUSTMENT OR MANAGEMENT OF VASCULAR ACCESS DEVICE: ICD-10-CM

## 2021-08-10 DIAGNOSIS — Z91.89 AT HIGH RISK FOR OSTEOPOROSIS: ICD-10-CM

## 2021-08-10 LAB
CALCIUM BLD-MCNC: 9.2 MG/DL (ref 8.5–10.1)
CREAT BLD-MCNC: 1.22 MG/DL

## 2021-08-10 PROCEDURE — 36415 COLL VENOUS BLD VENIPUNCTURE: CPT

## 2021-08-10 PROCEDURE — 82310 ASSAY OF CALCIUM: CPT

## 2021-08-10 PROCEDURE — 82565 ASSAY OF CREATININE: CPT

## 2021-08-11 ENCOUNTER — OFFICE VISIT (OUTPATIENT)
Dept: HEMATOLOGY/ONCOLOGY | Facility: HOSPITAL | Age: 61
End: 2021-08-11
Attending: INTERNAL MEDICINE
Payer: COMMERCIAL

## 2021-08-11 VITALS
HEART RATE: 66 BPM | OXYGEN SATURATION: 100 % | DIASTOLIC BLOOD PRESSURE: 86 MMHG | TEMPERATURE: 98 F | RESPIRATION RATE: 16 BRPM | SYSTOLIC BLOOD PRESSURE: 134 MMHG

## 2021-08-11 DIAGNOSIS — C68.9 UROTHELIAL CARCINOMA (HCC): ICD-10-CM

## 2021-08-11 DIAGNOSIS — Z17.0 MALIGNANT NEOPLASM OF OVERLAPPING SITES OF RIGHT BREAST IN FEMALE, ESTROGEN RECEPTOR POSITIVE (HCC): ICD-10-CM

## 2021-08-11 DIAGNOSIS — M81.0 AGE-RELATED OSTEOPOROSIS WITHOUT CURRENT PATHOLOGICAL FRACTURE: Primary | ICD-10-CM

## 2021-08-11 DIAGNOSIS — C50.811 MALIGNANT NEOPLASM OF OVERLAPPING SITES OF RIGHT BREAST IN FEMALE, ESTROGEN RECEPTOR POSITIVE (HCC): ICD-10-CM

## 2021-08-11 DIAGNOSIS — Z00.00 ROUTINE HEALTH MAINTENANCE: ICD-10-CM

## 2021-08-11 PROCEDURE — 96374 THER/PROPH/DIAG INJ IV PUSH: CPT

## 2021-08-11 RX ORDER — SODIUM CHLORIDE 9 MG/ML
10 INJECTION INTRAVENOUS ONCE
OUTPATIENT
Start: 2021-08-11

## 2021-08-11 RX ORDER — HEPARIN SODIUM (PORCINE) LOCK FLUSH IV SOLN 100 UNIT/ML 100 UNIT/ML
SOLUTION INTRAVENOUS
Status: COMPLETED
Start: 2021-08-11 | End: 2021-08-11

## 2021-08-11 RX ORDER — SODIUM CHLORIDE 9 MG/ML
INJECTION INTRAVENOUS
Status: DISCONTINUED
Start: 2021-08-11 | End: 2021-08-11

## 2021-08-11 RX ORDER — HEPARIN SODIUM (PORCINE) LOCK FLUSH IV SOLN 100 UNIT/ML 100 UNIT/ML
5 SOLUTION INTRAVENOUS ONCE
OUTPATIENT
Start: 2021-08-11

## 2021-08-11 RX ADMIN — HEPARIN SODIUM (PORCINE) LOCK FLUSH IV SOLN 100 UNIT/ML: 100 SOLUTION INTRAVENOUS at 11:34:00

## 2021-08-11 NOTE — PROGRESS NOTES
Pt tolerated zometa infusion well today without incident or complaint. Reviewed next MD appointment scheduled for December.      Education Record    Learner:  Patient    Disease / Diagnosis: breast ca    Barriers / Limitations:  None   Comments:    Method:

## 2021-10-04 RX ORDER — PANTOPRAZOLE SODIUM 40 MG/1
TABLET, DELAYED RELEASE ORAL
Qty: 90 TABLET | Refills: 0 | Status: SHIPPED | OUTPATIENT
Start: 2021-10-04 | End: 2021-12-29

## 2021-10-04 NOTE — TELEPHONE ENCOUNTER
Requested Prescriptions     Pending Prescriptions Disp Refills   • PANTOPRAZOLE 40 MG Oral Tab EC [Pharmacy Med Name: Pantoprazole Sodium Ec 40 Mg Tab Auro] 90 tablet 0     Sig: TAKE ONE TABLET BY MOUTH IN THE MORNING BEFORE BREAKFAST     LOV 8/20/2019 w/

## 2021-10-26 ENCOUNTER — TELEPHONE (OUTPATIENT)
Dept: HEMATOLOGY/ONCOLOGY | Facility: HOSPITAL | Age: 61
End: 2021-10-26

## 2021-10-26 NOTE — TELEPHONE ENCOUNTER
Received overnight message from [id number] [employer]. Patient received Vaccine (88643) Flublok Quadrivalent [id number] administered on 10/25/21.  Dose: 0.50

## 2021-12-09 DIAGNOSIS — D64.9 ANEMIA: ICD-10-CM

## 2021-12-09 DIAGNOSIS — Z00.00 ROUTINE HEALTH MAINTENANCE: Primary | ICD-10-CM

## 2021-12-13 ENCOUNTER — NURSE ONLY (OUTPATIENT)
Dept: HEMATOLOGY/ONCOLOGY | Facility: HOSPITAL | Age: 61
End: 2021-12-13
Attending: INTERNAL MEDICINE
Payer: COMMERCIAL

## 2021-12-13 VITALS
DIASTOLIC BLOOD PRESSURE: 77 MMHG | SYSTOLIC BLOOD PRESSURE: 142 MMHG | WEIGHT: 132 LBS | HEIGHT: 65.5 IN | BODY MASS INDEX: 21.73 KG/M2 | RESPIRATION RATE: 16 BRPM | HEART RATE: 68 BPM | TEMPERATURE: 99 F | OXYGEN SATURATION: 98 %

## 2021-12-13 DIAGNOSIS — R92.2 DENSE BREASTS: ICD-10-CM

## 2021-12-13 DIAGNOSIS — Z79.899 CURRENT USE OF PROTON PUMP INHIBITOR: ICD-10-CM

## 2021-12-13 DIAGNOSIS — Z79.811 ENCOUNTER FOR MONITORING AROMATASE INHIBITOR THERAPY: ICD-10-CM

## 2021-12-13 DIAGNOSIS — I89.0 LYMPHEDEMA OF RIGHT ARM: ICD-10-CM

## 2021-12-13 DIAGNOSIS — D64.9 ANEMIA: ICD-10-CM

## 2021-12-13 DIAGNOSIS — Z00.00 ROUTINE HEALTH MAINTENANCE: ICD-10-CM

## 2021-12-13 DIAGNOSIS — Z51.81 ENCOUNTER FOR MONITORING AROMATASE INHIBITOR THERAPY: ICD-10-CM

## 2021-12-13 DIAGNOSIS — C50.811 MALIGNANT NEOPLASM OF OVERLAPPING SITES OF RIGHT BREAST IN FEMALE, ESTROGEN RECEPTOR POSITIVE (HCC): Primary | ICD-10-CM

## 2021-12-13 DIAGNOSIS — M85.89 OSTEOPENIA OF MULTIPLE SITES: ICD-10-CM

## 2021-12-13 DIAGNOSIS — Z17.0 MALIGNANT NEOPLASM OF OVERLAPPING SITES OF RIGHT BREAST IN FEMALE, ESTROGEN RECEPTOR POSITIVE (HCC): Primary | ICD-10-CM

## 2021-12-13 DIAGNOSIS — C68.9 UROTHELIAL CARCINOMA (HCC): ICD-10-CM

## 2021-12-13 PROCEDURE — 80053 COMPREHEN METABOLIC PANEL: CPT

## 2021-12-13 PROCEDURE — 36415 COLL VENOUS BLD VENIPUNCTURE: CPT

## 2021-12-13 PROCEDURE — 99214 OFFICE O/P EST MOD 30 MIN: CPT | Performed by: INTERNAL MEDICINE

## 2021-12-13 PROCEDURE — 85025 COMPLETE CBC W/AUTO DIFF WBC: CPT

## 2021-12-13 PROCEDURE — 96523 IRRIG DRUG DELIVERY DEVICE: CPT

## 2021-12-13 NOTE — PROGRESS NOTES
HPI   12/13/2021  Lynnette Israel is a 57year old female with a history of right Stage IIA IDC of the right breast 2007. Patient has remain concerned with the risk for recurrent breast cancer or a second breast cancer.   She has wished to continue [place] received COVID vaccine and continues to wear a mask. Oncology History   Malignant neoplasm of overlapping sites of right breast in female, estrogen receptor positive ([facility] [address]   12/14/2007 Biopsy    Right breast core biopsies-invasive ductal carcinoma.   E esophagus  Last colonoscopy 5/11/2021, diverticulosis and colon polyps   Genitourinary: Negative for dysuria, flank pain, hematuria and urgency. Follow-up with  February 2022   Musculoskeletal: Negative for back pain.         Patient initially had Hydronephrosis    • Lymphedema of arm     Right arm   • Malignant neoplasm of overlapping sites of right breast in female, estrogen receptor positive ([facility] [address] 12/14/2007   • Multiple polyps of sigmoid colon 9/19/2016   • Osteopenia determined by x-ray 6/16/20 Spouse name: Not on file      Number of children: Not on file      Years of education: Not on file      Highest education level: Not on file    Occupational History      Not on file    Tobacco Use      Smoking status: Former Smoker      Smokeless tobacco: note reviewed. Constitutional:       General: She is not in acute distress. Appearance: She is well-developed.       Comments: /77 (BP Location: Left arm, Patient Position: Sitting, Cuff Size: adult)   Pulse 68   Temp 98.7 °F (37.1 °C) (Oral) Judgment normal.           ASSESSMENT/PLAN:   Malignant neoplasm of overlapping sites of right breast in female, estrogen receptor positive (hcc)  (primary encounter diagnosis)  Encounter for monitoring aromatase inhibitor therapy  Dense breasts  Osteopeni RBC      3.80 - 5.30 x10(6)uL 3.95 3.92   Hemoglobin      12.0 - 16.0 g/dL 12.0 11.9 (L)   Hematocrit      35.0 - 48.0 % 37.9 37.7   MCV      80.0 - 100.0 fL 95.9 96.2   MCH      26.0 - 34.0 pg 30.4 30.4   MCHC      31.0 - 37.0 g/dL 31.7 31.6   RDW-SD Patient Fasting for CMP?        Patient not present      8/9/2021 MRI breast with and without contrast bilateral  LEFT BREAST:  Susceptibility artifact from known biopsy clip is seen in the outer left breast.  A Port-A-Cath is seen in the subcutaneous tis loss of 2.1 % from June 29, 2018  and the AP lumbar has gone from 0.736 to 0.863 a rise of 17.3 %. 10 year Fracture Risk:   Major Osteoporotic Fracture:  18 %.    Hip Fracture:  3.3 %.   =================================================================== ========================================================  9/9/2019 PA and lateral chest  A few reticular nodular changes are seen along the right upper lobe. Correlate with prior imaging to document chronicity.  An acute process cannot be completely exclu

## 2021-12-23 ENCOUNTER — IMMUNIZATION (OUTPATIENT)
Dept: LAB | Facility: HOSPITAL | Age: 61
End: 2021-12-23
Attending: EMERGENCY MEDICINE
Payer: COMMERCIAL

## 2021-12-23 DIAGNOSIS — Z23 NEED FOR VACCINATION: Primary | ICD-10-CM

## 2021-12-23 PROCEDURE — 0004A SARSCOV2 VAC 30MCG/0.3ML IM: CPT

## 2021-12-29 RX ORDER — PANTOPRAZOLE SODIUM 40 MG/1
TABLET, DELAYED RELEASE ORAL
Qty: 90 TABLET | Refills: 0 | Status: SHIPPED | OUTPATIENT
Start: 2021-12-29

## 2021-12-29 NOTE — TELEPHONE ENCOUNTER
Requested Prescriptions     Pending Prescriptions Disp Refills   • PANTOPRAZOLE 40 MG Oral Tab EC [Pharmacy Med Name: Pantoprazole Sodium Ec 40 Mg Tab Auro] 90 tablet 0     Sig: TAKE ONE TABLET BY MOUTH IN THE MORNING BEFORE BREAKFAST     LOV: 08/20/2019

## 2022-01-18 RX ORDER — ANASTROZOLE 1 MG/1
1 TABLET ORAL DAILY
Qty: 30 TABLET | Refills: 5 | Status: SHIPPED | OUTPATIENT
Start: 2022-01-18 | End: 2022-06-15

## 2022-02-15 ENCOUNTER — APPOINTMENT (OUTPATIENT)
Dept: HEMATOLOGY/ONCOLOGY | Facility: HOSPITAL | Age: 62
End: 2022-02-15
Attending: INTERNAL MEDICINE
Payer: COMMERCIAL

## 2022-02-17 ENCOUNTER — TELEPHONE (OUTPATIENT)
Dept: HEMATOLOGY/ONCOLOGY | Facility: HOSPITAL | Age: 62
End: 2022-02-17

## 2022-02-17 NOTE — TELEPHONE ENCOUNTER
Patient has an infusion scheduled for 2/21, she needs an order put in so she can get her blood work done prior to appointment

## 2022-02-17 NOTE — TELEPHONE ENCOUNTER
Placed orders for zometa labs - due for mammogram orders placed has appointment for follow up in June. - Called patient is aware of orders.

## 2022-02-19 ENCOUNTER — LAB ENCOUNTER (OUTPATIENT)
Dept: LAB | Age: 62
End: 2022-02-19
Attending: NURSE PRACTITIONER
Payer: COMMERCIAL

## 2022-02-19 DIAGNOSIS — M81.0 OSTEOPOROSIS: ICD-10-CM

## 2022-02-19 DIAGNOSIS — M85.80 OSTEOPENIA DETERMINED BY X-RAY: ICD-10-CM

## 2022-02-19 DIAGNOSIS — Z51.81 MEDICATION MONITORING ENCOUNTER: ICD-10-CM

## 2022-02-19 LAB
CALCIUM BLD-MCNC: 10 MG/DL (ref 8.5–10.1)
CREAT BLD-MCNC: 1.22 MG/DL

## 2022-02-19 PROCEDURE — 82310 ASSAY OF CALCIUM: CPT

## 2022-02-19 PROCEDURE — 82565 ASSAY OF CREATININE: CPT

## 2022-02-19 PROCEDURE — 36415 COLL VENOUS BLD VENIPUNCTURE: CPT

## 2022-02-21 ENCOUNTER — OFFICE VISIT (OUTPATIENT)
Dept: HEMATOLOGY/ONCOLOGY | Facility: HOSPITAL | Age: 62
End: 2022-02-21
Attending: INTERNAL MEDICINE
Payer: COMMERCIAL

## 2022-02-21 VITALS
DIASTOLIC BLOOD PRESSURE: 68 MMHG | RESPIRATION RATE: 16 BRPM | OXYGEN SATURATION: 98 % | HEART RATE: 71 BPM | TEMPERATURE: 98 F | SYSTOLIC BLOOD PRESSURE: 126 MMHG

## 2022-02-21 DIAGNOSIS — Z00.00 ROUTINE HEALTH MAINTENANCE: ICD-10-CM

## 2022-02-21 DIAGNOSIS — Z17.0 MALIGNANT NEOPLASM OF OVERLAPPING SITES OF RIGHT BREAST IN FEMALE, ESTROGEN RECEPTOR POSITIVE (HCC): ICD-10-CM

## 2022-02-21 DIAGNOSIS — C50.811 MALIGNANT NEOPLASM OF OVERLAPPING SITES OF RIGHT BREAST IN FEMALE, ESTROGEN RECEPTOR POSITIVE (HCC): ICD-10-CM

## 2022-02-21 DIAGNOSIS — M81.0 AGE-RELATED OSTEOPOROSIS WITHOUT CURRENT PATHOLOGICAL FRACTURE: Primary | ICD-10-CM

## 2022-02-21 DIAGNOSIS — C68.9 UROTHELIAL CARCINOMA (HCC): ICD-10-CM

## 2022-02-21 PROCEDURE — 96365 THER/PROPH/DIAG IV INF INIT: CPT

## 2022-02-21 RX ORDER — SODIUM CHLORIDE 9 MG/ML
10 INJECTION INTRAVENOUS ONCE
Status: CANCELLED | OUTPATIENT
Start: 2022-02-21

## 2022-02-21 RX ORDER — HEPARIN SODIUM (PORCINE) LOCK FLUSH IV SOLN 100 UNIT/ML 100 UNIT/ML
5 SOLUTION INTRAVENOUS ONCE
Status: COMPLETED | OUTPATIENT
Start: 2022-02-21 | End: 2022-02-21

## 2022-02-21 RX ORDER — HEPARIN SODIUM (PORCINE) LOCK FLUSH IV SOLN 100 UNIT/ML 100 UNIT/ML
SOLUTION INTRAVENOUS
Status: DISCONTINUED
Start: 2022-02-21 | End: 2022-02-21

## 2022-02-21 RX ORDER — HEPARIN SODIUM (PORCINE) LOCK FLUSH IV SOLN 100 UNIT/ML 100 UNIT/ML
5 SOLUTION INTRAVENOUS ONCE
Status: CANCELLED | OUTPATIENT
Start: 2022-02-21

## 2022-02-21 RX ADMIN — HEPARIN SODIUM (PORCINE) LOCK FLUSH IV SOLN 100 UNIT/ML 500 UNITS: 100 SOLUTION INTRAVENOUS at 16:00:00

## 2022-02-21 NOTE — PROGRESS NOTES
Nancy to infusion today for Zometa infusion. She arrives alert, no complaints today. She had labs drawn outpatient. Cr: 1.22 Yang: 10  Patient denies any recent or upcoming invasive dental procedures. Will have routine cleaning this week and notify dentist of Zometa infusion. Denies jaw pain or discomfort. Port accessed using sterile technique, flushes easily but no blood return noted. Patient with previous dye test to confirm placement. No pain with flushing. Zometa infused over 15 minutes and patient tolerated well, no s/s of adverse reaction noted or reported. Port flushed per protocol with NS and 500 units Heparin. Port de-cannulated, applied 2x2 gauze and paper tape to site. Amado Mcfadden discharged in stable condition from infusion, aware of appointments in June. Will need new order for next Zometa infusion.

## 2022-02-22 ENCOUNTER — OFFICE VISIT (OUTPATIENT)
Dept: INTERNAL MEDICINE CLINIC | Facility: CLINIC | Age: 62
End: 2022-02-22
Payer: COMMERCIAL

## 2022-02-22 VITALS
HEART RATE: 76 BPM | TEMPERATURE: 99 F | HEIGHT: 65.5 IN | SYSTOLIC BLOOD PRESSURE: 124 MMHG | DIASTOLIC BLOOD PRESSURE: 72 MMHG | OXYGEN SATURATION: 100 % | WEIGHT: 136 LBS | BODY MASS INDEX: 22.39 KG/M2

## 2022-02-22 DIAGNOSIS — K22.70 BARRETT'S ESOPHAGUS WITHOUT DYSPLASIA: ICD-10-CM

## 2022-02-22 DIAGNOSIS — C68.9 UROTHELIAL CARCINOMA (HCC): Primary | ICD-10-CM

## 2022-02-22 DIAGNOSIS — Z79.899 CURRENT USE OF PROTON PUMP INHIBITOR: ICD-10-CM

## 2022-02-22 DIAGNOSIS — Z17.0 MALIGNANT NEOPLASM OF OVERLAPPING SITES OF RIGHT BREAST IN FEMALE, ESTROGEN RECEPTOR POSITIVE (HCC): ICD-10-CM

## 2022-02-22 DIAGNOSIS — M85.89 OSTEOPENIA OF MULTIPLE SITES: ICD-10-CM

## 2022-02-22 DIAGNOSIS — C50.811 MALIGNANT NEOPLASM OF OVERLAPPING SITES OF RIGHT BREAST IN FEMALE, ESTROGEN RECEPTOR POSITIVE (HCC): ICD-10-CM

## 2022-02-22 DIAGNOSIS — I10 HYPERTENSION, ESSENTIAL: ICD-10-CM

## 2022-02-22 DIAGNOSIS — Z00.00 ROUTINE HEALTH MAINTENANCE: ICD-10-CM

## 2022-02-22 DIAGNOSIS — M81.0 AGE-RELATED OSTEOPOROSIS WITHOUT CURRENT PATHOLOGICAL FRACTURE: ICD-10-CM

## 2022-02-22 DIAGNOSIS — E53.8 VITAMIN B12 DEFICIENCY: ICD-10-CM

## 2022-02-22 PROCEDURE — 99396 PREV VISIT EST AGE 40-64: CPT | Performed by: INTERNAL MEDICINE

## 2022-02-22 PROCEDURE — 3078F DIAST BP <80 MM HG: CPT | Performed by: INTERNAL MEDICINE

## 2022-02-22 PROCEDURE — 3008F BODY MASS INDEX DOCD: CPT | Performed by: INTERNAL MEDICINE

## 2022-02-22 PROCEDURE — 3074F SYST BP LT 130 MM HG: CPT | Performed by: INTERNAL MEDICINE

## 2022-02-22 RX ORDER — MELATONIN
1000 EVERY OTHER DAY
Qty: 1 TABLET | Refills: 0 | COMMUNITY
Start: 2022-02-22

## 2022-02-22 RX ORDER — MELATONIN
500 EVERY OTHER DAY
Qty: 1 TABLET | Refills: 0 | COMMUNITY
Start: 2022-02-22 | End: 2022-02-22

## 2022-02-23 ENCOUNTER — LAB ENCOUNTER (OUTPATIENT)
Dept: LAB | Age: 62
End: 2022-02-23
Attending: INTERNAL MEDICINE
Payer: COMMERCIAL

## 2022-02-23 DIAGNOSIS — I10 HYPERTENSION, ESSENTIAL: ICD-10-CM

## 2022-02-23 DIAGNOSIS — M85.89 OSTEOPENIA OF MULTIPLE SITES: ICD-10-CM

## 2022-02-23 DIAGNOSIS — E53.8 VITAMIN B12 DEFICIENCY: ICD-10-CM

## 2022-02-23 LAB
ANION GAP SERPL CALC-SCNC: 4 MMOL/L (ref 0–18)
BASOPHILS # BLD AUTO: 0.04 X10(3) UL (ref 0–0.2)
BASOPHILS NFR BLD AUTO: 0.5 %
BUN BLD-MCNC: 17 MG/DL (ref 7–18)
CALCIUM BLD-MCNC: 9.8 MG/DL (ref 8.5–10.1)
CHLORIDE SERPL-SCNC: 105 MMOL/L (ref 98–112)
CHOLEST SERPL-MCNC: 198 MG/DL (ref ?–200)
CO2 SERPL-SCNC: 31 MMOL/L (ref 21–32)
CREAT BLD-MCNC: 1.06 MG/DL
EOSINOPHIL # BLD AUTO: 0.07 X10(3) UL (ref 0–0.7)
EOSINOPHIL NFR BLD AUTO: 0.9 %
ERYTHROCYTE [DISTWIDTH] IN BLOOD BY AUTOMATED COUNT: 13.3 %
FASTING PATIENT LIPID ANSWER: YES
FASTING STATUS PATIENT QL REPORTED: YES
GLUCOSE BLD-MCNC: 98 MG/DL (ref 70–99)
HCT VFR BLD AUTO: 40.3 %
HDLC SERPL-MCNC: 74 MG/DL (ref 40–59)
HGB BLD-MCNC: 12.7 G/DL
IMM GRANULOCYTES # BLD AUTO: 0.02 X10(3) UL (ref 0–1)
IMM GRANULOCYTES NFR BLD: 0.3 %
LDLC SERPL CALC-MCNC: 108 MG/DL (ref ?–100)
LYMPHOCYTES # BLD AUTO: 0.95 X10(3) UL (ref 1–4)
LYMPHOCYTES NFR BLD AUTO: 12.5 %
MCH RBC QN AUTO: 30.6 PG (ref 26–34)
MCHC RBC AUTO-ENTMCNC: 31.5 G/DL (ref 31–37)
MCV RBC AUTO: 97.1 FL
MONOCYTES # BLD AUTO: 0.58 X10(3) UL (ref 0.1–1)
MONOCYTES NFR BLD AUTO: 7.7 %
NEUTROPHILS # BLD AUTO: 5.92 X10 (3) UL (ref 1.5–7.7)
NEUTROPHILS # BLD AUTO: 5.92 X10(3) UL (ref 1.5–7.7)
NEUTROPHILS NFR BLD AUTO: 78.1 %
NONHDLC SERPL-MCNC: 124 MG/DL (ref ?–130)
OSMOLALITY SERPL CALC.SUM OF ELEC: 292 MOSM/KG (ref 275–295)
PLATELET # BLD AUTO: 241 10(3)UL (ref 150–450)
POTASSIUM SERPL-SCNC: 4.9 MMOL/L (ref 3.5–5.1)
RBC # BLD AUTO: 4.15 X10(6)UL
SODIUM SERPL-SCNC: 140 MMOL/L (ref 136–145)
TRIGL SERPL-MCNC: 87 MG/DL (ref 30–149)
TSI SER-ACNC: 0.8 MIU/ML (ref 0.36–3.74)
VIT B12 SERPL-MCNC: 825 PG/ML (ref 193–986)
VIT D+METAB SERPL-MCNC: 81.5 NG/ML (ref 30–100)
VLDLC SERPL CALC-MCNC: 15 MG/DL (ref 0–30)
WBC # BLD AUTO: 7.6 X10(3) UL (ref 4–11)

## 2022-02-23 PROCEDURE — 84443 ASSAY THYROID STIM HORMONE: CPT | Performed by: INTERNAL MEDICINE

## 2022-02-23 PROCEDURE — 80061 LIPID PANEL: CPT

## 2022-02-23 PROCEDURE — 80048 BASIC METABOLIC PNL TOTAL CA: CPT

## 2022-02-23 PROCEDURE — 36415 COLL VENOUS BLD VENIPUNCTURE: CPT

## 2022-02-23 PROCEDURE — 82306 VITAMIN D 25 HYDROXY: CPT

## 2022-02-23 PROCEDURE — 85025 COMPLETE CBC W/AUTO DIFF WBC: CPT

## 2022-02-23 PROCEDURE — 82607 VITAMIN B-12: CPT

## 2022-03-09 ENCOUNTER — PATIENT MESSAGE (OUTPATIENT)
Dept: INTERNAL MEDICINE CLINIC | Facility: CLINIC | Age: 62
End: 2022-03-09

## 2022-03-09 ENCOUNTER — TELEPHONE (OUTPATIENT)
Dept: INTERNAL MEDICINE CLINIC | Facility: CLINIC | Age: 62
End: 2022-03-09

## 2022-03-09 NOTE — TELEPHONE ENCOUNTER
Patient is calling today to request a call back from a nurse to go over her blood work results. Patient can see them in [address] but is not sure exactly what they mean.  # [phone] or send a message on [employer] to go further in detail.

## 2022-03-14 ENCOUNTER — HOSPITAL ENCOUNTER (OUTPATIENT)
Dept: MAMMOGRAPHY | Facility: HOSPITAL | Age: 62
Discharge: HOME | End: 2022-03-14
Attending: NURSE PRACTITIONER
Payer: COMMERCIAL

## 2022-03-14 DIAGNOSIS — Z12.31 ENCOUNTER FOR SCREENING MAMMOGRAM FOR MALIGNANT NEOPLASM OF BREAST: ICD-10-CM

## 2022-03-14 PROCEDURE — 77067 SCR MAMMO BI INCL CAD: CPT | Performed by: NURSE PRACTITIONER

## 2022-03-14 PROCEDURE — 77063 BREAST TOMOSYNTHESIS BI: CPT | Performed by: NURSE PRACTITIONER

## 2022-03-18 RX ORDER — LISINOPRIL 10 MG/1
TABLET ORAL
Qty: 90 TABLET | Refills: 3 | Status: SHIPPED | OUTPATIENT
Start: 2022-03-18

## 2022-03-18 RX ORDER — PANTOPRAZOLE SODIUM 40 MG/1
TABLET, DELAYED RELEASE ORAL
Qty: 90 TABLET | Refills: 0 | Status: SHIPPED | OUTPATIENT
Start: 2022-03-18

## 2022-04-20 RX ORDER — ESCITALOPRAM OXALATE 20 MG/1
TABLET ORAL
Qty: 30 TABLET | Refills: 2 | Status: SHIPPED | OUTPATIENT
Start: 2022-04-20 | End: 2022-07-11

## 2022-06-13 RX ORDER — PANTOPRAZOLE SODIUM 40 MG/1
TABLET, DELAYED RELEASE ORAL
Qty: 90 TABLET | Refills: 0 | Status: SHIPPED | OUTPATIENT
Start: 2022-06-13

## 2022-06-14 RX ORDER — SODIUM CHLORIDE 9 MG/ML
10 INJECTION INTRAVENOUS ONCE
OUTPATIENT
Start: 2022-06-14

## 2022-06-14 RX ORDER — HEPARIN SODIUM (PORCINE) LOCK FLUSH IV SOLN 100 UNIT/ML 100 UNIT/ML
5 SOLUTION INTRAVENOUS ONCE
Status: CANCELLED | OUTPATIENT
Start: 2022-06-14

## 2022-06-15 ENCOUNTER — OFFICE VISIT (OUTPATIENT)
Dept: HEMATOLOGY/ONCOLOGY | Facility: HOSPITAL | Age: 62
End: 2022-06-15
Attending: INTERNAL MEDICINE
Payer: COMMERCIAL

## 2022-06-15 VITALS
HEIGHT: 65.5 IN | HEART RATE: 71 BPM | SYSTOLIC BLOOD PRESSURE: 129 MMHG | DIASTOLIC BLOOD PRESSURE: 70 MMHG | RESPIRATION RATE: 16 BRPM | OXYGEN SATURATION: 99 % | BODY MASS INDEX: 21.73 KG/M2 | TEMPERATURE: 98 F | WEIGHT: 132 LBS

## 2022-06-15 DIAGNOSIS — M85.89 OSTEOPENIA OF MULTIPLE SITES: ICD-10-CM

## 2022-06-15 DIAGNOSIS — C50.811 MALIGNANT NEOPLASM OF OVERLAPPING SITES OF RIGHT BREAST IN FEMALE, ESTROGEN RECEPTOR POSITIVE (HCC): Primary | ICD-10-CM

## 2022-06-15 DIAGNOSIS — Z00.00 ROUTINE HEALTH MAINTENANCE: ICD-10-CM

## 2022-06-15 DIAGNOSIS — Z17.0 MALIGNANT NEOPLASM OF OVERLAPPING SITES OF RIGHT BREAST IN FEMALE, ESTROGEN RECEPTOR POSITIVE (HCC): Primary | ICD-10-CM

## 2022-06-15 DIAGNOSIS — C68.9 UROTHELIAL CARCINOMA (HCC): ICD-10-CM

## 2022-06-15 DIAGNOSIS — I89.0 LYMPHEDEMA OF ARM: ICD-10-CM

## 2022-06-15 DIAGNOSIS — Z51.81 ENCOUNTER FOR MONITORING AROMATASE INHIBITOR THERAPY: ICD-10-CM

## 2022-06-15 DIAGNOSIS — Z12.31 SCREENING MAMMOGRAM FOR BREAST CANCER: ICD-10-CM

## 2022-06-15 DIAGNOSIS — Z79.811 ENCOUNTER FOR MONITORING AROMATASE INHIBITOR THERAPY: ICD-10-CM

## 2022-06-15 DIAGNOSIS — M81.0 AGE-RELATED OSTEOPOROSIS WITHOUT CURRENT PATHOLOGICAL FRACTURE: Primary | ICD-10-CM

## 2022-06-15 DIAGNOSIS — C50.811 MALIGNANT NEOPLASM OF OVERLAPPING SITES OF RIGHT BREAST IN FEMALE, ESTROGEN RECEPTOR POSITIVE (HCC): ICD-10-CM

## 2022-06-15 DIAGNOSIS — Z17.0 MALIGNANT NEOPLASM OF OVERLAPPING SITES OF RIGHT BREAST IN FEMALE, ESTROGEN RECEPTOR POSITIVE (HCC): ICD-10-CM

## 2022-06-15 PROCEDURE — 96523 IRRIG DRUG DELIVERY DEVICE: CPT

## 2022-06-15 PROCEDURE — 99214 OFFICE O/P EST MOD 30 MIN: CPT | Performed by: INTERNAL MEDICINE

## 2022-06-15 RX ORDER — SODIUM CHLORIDE 9 MG/ML
10 INJECTION INTRAVENOUS ONCE
OUTPATIENT
Start: 2022-06-15

## 2022-06-15 RX ORDER — HEPARIN SODIUM (PORCINE) LOCK FLUSH IV SOLN 100 UNIT/ML 100 UNIT/ML
5 SOLUTION INTRAVENOUS ONCE
Status: COMPLETED | OUTPATIENT
Start: 2022-06-15 | End: 2022-06-15

## 2022-06-15 RX ORDER — HEPARIN SODIUM (PORCINE) LOCK FLUSH IV SOLN 100 UNIT/ML 100 UNIT/ML
5 SOLUTION INTRAVENOUS ONCE
OUTPATIENT
Start: 2022-06-15

## 2022-06-15 RX ADMIN — HEPARIN SODIUM (PORCINE) LOCK FLUSH IV SOLN 100 UNIT/ML 500 UNITS: 100 SOLUTION INTRAVENOUS at 14:09:00

## 2022-06-20 ENCOUNTER — APPOINTMENT (OUTPATIENT)
Dept: HEMATOLOGY/ONCOLOGY | Facility: HOSPITAL | Age: 62
End: 2022-06-20
Attending: INTERNAL MEDICINE
Payer: COMMERCIAL

## 2022-07-11 RX ORDER — ESCITALOPRAM OXALATE 20 MG/1
TABLET ORAL
Qty: 30 TABLET | Refills: 3 | Status: SHIPPED | OUTPATIENT
Start: 2022-07-11 | End: 2022-12-09

## 2022-07-13 ENCOUNTER — HOSPITAL ENCOUNTER (OUTPATIENT)
Dept: BONE DENSITY | Facility: HOSPITAL | Age: 62
Discharge: HOME | End: 2022-07-13
Attending: INTERNAL MEDICINE
Payer: COMMERCIAL

## 2022-07-13 DIAGNOSIS — M85.89 OSTEOPENIA OF MULTIPLE SITES: ICD-10-CM

## 2022-07-13 PROCEDURE — 77080 DXA BONE DENSITY AXIAL: CPT | Performed by: INTERNAL MEDICINE

## 2022-10-24 RX ORDER — PANTOPRAZOLE SODIUM 40 MG/1
TABLET, DELAYED RELEASE ORAL
Qty: 90 TABLET | Refills: 0 | Status: SHIPPED | OUTPATIENT
Start: 2022-10-24

## 2023-01-03 RX ORDER — ESCITALOPRAM OXALATE 20 MG/1
TABLET ORAL
Qty: 30 TABLET | Refills: 0 | Status: SHIPPED | OUTPATIENT
Start: 2023-01-03

## 2023-01-31 RX ORDER — ESCITALOPRAM OXALATE 20 MG/1
TABLET ORAL
Qty: 90 TABLET | Refills: 3 | Status: SHIPPED | OUTPATIENT
Start: 2023-01-31

## 2023-01-31 NOTE — TELEPHONE ENCOUNTER
Refill Request  (Newest Message First)   Huyen Pruitt MD  You 14 hours ago (4:12 PM)     Hi,     Can you refill and manage depression/anxiety? Thanks! AG      Interface, [address] Kamron Danette 4 days ago     [address] requested follow up on 1/30/2023. Pt was started on lexapro by Dr. Arch Sandifer for anxiety. I will take over refills/management.    eRx sent

## 2023-02-28 ENCOUNTER — OFFICE VISIT (OUTPATIENT)
Dept: INTERNAL MEDICINE CLINIC | Facility: CLINIC | Age: 63
End: 2023-02-28

## 2023-02-28 VITALS
DIASTOLIC BLOOD PRESSURE: 84 MMHG | HEART RATE: 92 BPM | WEIGHT: 133.63 LBS | TEMPERATURE: 99 F | BODY MASS INDEX: 22 KG/M2 | SYSTOLIC BLOOD PRESSURE: 124 MMHG | OXYGEN SATURATION: 100 % | HEIGHT: 65.5 IN

## 2023-02-28 DIAGNOSIS — K22.70 BARRETT'S ESOPHAGUS WITHOUT DYSPLASIA: ICD-10-CM

## 2023-02-28 DIAGNOSIS — C68.9 UROTHELIAL CARCINOMA (HCC): Primary | ICD-10-CM

## 2023-02-28 DIAGNOSIS — C50.811 MALIGNANT NEOPLASM OF OVERLAPPING SITES OF RIGHT BREAST IN FEMALE, ESTROGEN RECEPTOR POSITIVE (HCC): ICD-10-CM

## 2023-02-28 DIAGNOSIS — M81.0 AGE-RELATED OSTEOPOROSIS WITHOUT CURRENT PATHOLOGICAL FRACTURE: ICD-10-CM

## 2023-02-28 DIAGNOSIS — M85.89 OSTEOPENIA OF MULTIPLE SITES: ICD-10-CM

## 2023-02-28 DIAGNOSIS — Z17.0 MALIGNANT NEOPLASM OF OVERLAPPING SITES OF RIGHT BREAST IN FEMALE, ESTROGEN RECEPTOR POSITIVE (HCC): ICD-10-CM

## 2023-02-28 DIAGNOSIS — M85.80 OSTEOPENIA DETERMINED BY X-RAY: ICD-10-CM

## 2023-02-28 DIAGNOSIS — K63.5 MULTIPLE POLYPS OF SIGMOID COLON: ICD-10-CM

## 2023-02-28 DIAGNOSIS — Z79.899 CURRENT USE OF PROTON PUMP INHIBITOR: ICD-10-CM

## 2023-02-28 DIAGNOSIS — E53.8 VITAMIN B12 DEFICIENCY: ICD-10-CM

## 2023-02-28 DIAGNOSIS — Z12.72 SCREENING FOR VAGINAL CANCER: ICD-10-CM

## 2023-02-28 DIAGNOSIS — Z00.00 ROUTINE HEALTH MAINTENANCE: ICD-10-CM

## 2023-02-28 DIAGNOSIS — I89.0 LYMPHEDEMA OF ARM: ICD-10-CM

## 2023-02-28 PROBLEM — M89.8X8 ILIAC BONE PAIN: Status: RESOLVED | Noted: 2019-11-26 | Resolved: 2023-02-28

## 2023-02-28 PROCEDURE — 3008F BODY MASS INDEX DOCD: CPT | Performed by: INTERNAL MEDICINE

## 2023-02-28 PROCEDURE — 99396 PREV VISIT EST AGE 40-64: CPT | Performed by: INTERNAL MEDICINE

## 2023-02-28 PROCEDURE — 3079F DIAST BP 80-89 MM HG: CPT | Performed by: INTERNAL MEDICINE

## 2023-02-28 PROCEDURE — 3074F SYST BP LT 130 MM HG: CPT | Performed by: INTERNAL MEDICINE

## 2023-02-28 PROCEDURE — 90471 IMMUNIZATION ADMIN: CPT | Performed by: INTERNAL MEDICINE

## 2023-02-28 PROCEDURE — 90715 TDAP VACCINE 7 YRS/> IM: CPT | Performed by: INTERNAL MEDICINE

## 2023-02-28 RX ORDER — PANTOPRAZOLE SODIUM 40 MG/1
40 TABLET, DELAYED RELEASE ORAL
Qty: 90 TABLET | Refills: 3 | Status: SHIPPED | OUTPATIENT
Start: 2023-02-28

## 2023-02-28 RX ORDER — LISINOPRIL 10 MG/1
10 TABLET ORAL DAILY
Qty: 90 TABLET | Refills: 3 | Status: SHIPPED | OUTPATIENT
Start: 2023-02-28

## 2023-02-28 RX ORDER — ESCITALOPRAM OXALATE 10 MG/1
10 TABLET ORAL DAILY
Qty: 90 TABLET | Refills: 3 | Status: SHIPPED | OUTPATIENT
Start: 2023-02-28

## 2023-03-06 ENCOUNTER — LAB ENCOUNTER (OUTPATIENT)
Dept: LAB | Age: 63
End: 2023-03-06
Attending: INTERNAL MEDICINE
Payer: COMMERCIAL

## 2023-03-06 DIAGNOSIS — E53.8 VITAMIN B12 DEFICIENCY: ICD-10-CM

## 2023-03-06 DIAGNOSIS — M85.89 OSTEOPENIA OF MULTIPLE SITES: ICD-10-CM

## 2023-03-06 DIAGNOSIS — Z00.00 ROUTINE HEALTH MAINTENANCE: ICD-10-CM

## 2023-03-06 LAB
ALBUMIN SERPL-MCNC: 3.7 G/DL (ref 3.4–5)
ALBUMIN/GLOB SERPL: 1.3 {RATIO} (ref 1–2)
ALP LIVER SERPL-CCNC: 40 U/L
ALT SERPL-CCNC: 15 U/L
ANION GAP SERPL CALC-SCNC: 0 MMOL/L (ref 0–18)
AST SERPL-CCNC: 15 U/L (ref 15–37)
BASOPHILS # BLD AUTO: 0.03 X10(3) UL (ref 0–0.2)
BASOPHILS NFR BLD AUTO: 0.8 %
BILIRUB SERPL-MCNC: 0.7 MG/DL (ref 0.1–2)
BUN BLD-MCNC: 19 MG/DL (ref 7–18)
CALCIUM BLD-MCNC: 9.4 MG/DL (ref 8.5–10.1)
CHLORIDE SERPL-SCNC: 106 MMOL/L (ref 98–112)
CHOLEST SERPL-MCNC: 173 MG/DL (ref ?–200)
CO2 SERPL-SCNC: 31 MMOL/L (ref 21–32)
CREAT BLD-MCNC: 1.01 MG/DL
EOSINOPHIL # BLD AUTO: 0.12 X10(3) UL (ref 0–0.7)
EOSINOPHIL NFR BLD AUTO: 3.3 %
ERYTHROCYTE [DISTWIDTH] IN BLOOD BY AUTOMATED COUNT: 13.5 %
FASTING PATIENT LIPID ANSWER: YES
FASTING STATUS PATIENT QL REPORTED: YES
GFR SERPLBLD BASED ON 1.73 SQ M-ARVRAT: 62 ML/MIN/1.73M2 (ref 60–?)
GLOBULIN PLAS-MCNC: 2.8 G/DL (ref 2.8–4.4)
GLUCOSE BLD-MCNC: 89 MG/DL (ref 70–99)
HCT VFR BLD AUTO: 37.8 %
HDLC SERPL-MCNC: 78 MG/DL (ref 40–59)
HGB BLD-MCNC: 12 G/DL
IMM GRANULOCYTES # BLD AUTO: 0 X10(3) UL (ref 0–1)
IMM GRANULOCYTES NFR BLD: 0 %
LDLC SERPL CALC-MCNC: 86 MG/DL (ref ?–100)
LYMPHOCYTES # BLD AUTO: 1.45 X10(3) UL (ref 1–4)
LYMPHOCYTES NFR BLD AUTO: 40.2 %
MCH RBC QN AUTO: 30.5 PG (ref 26–34)
MCHC RBC AUTO-ENTMCNC: 31.7 G/DL (ref 31–37)
MCV RBC AUTO: 96.2 FL
MONOCYTES # BLD AUTO: 0.31 X10(3) UL (ref 0.1–1)
MONOCYTES NFR BLD AUTO: 8.6 %
NEUTROPHILS # BLD AUTO: 1.7 X10 (3) UL (ref 1.5–7.7)
NEUTROPHILS # BLD AUTO: 1.7 X10(3) UL (ref 1.5–7.7)
NEUTROPHILS NFR BLD AUTO: 47.1 %
NONHDLC SERPL-MCNC: 95 MG/DL (ref ?–130)
OSMOLALITY SERPL CALC.SUM OF ELEC: 286 MOSM/KG (ref 275–295)
PLATELET # BLD AUTO: 246 10(3)UL (ref 150–450)
POTASSIUM SERPL-SCNC: 5 MMOL/L (ref 3.5–5.1)
PROT SERPL-MCNC: 6.5 G/DL (ref 6.4–8.2)
RBC # BLD AUTO: 3.93 X10(6)UL
SODIUM SERPL-SCNC: 137 MMOL/L (ref 136–145)
TRIGL SERPL-MCNC: 44 MG/DL (ref 30–149)
TSI SER-ACNC: 1.42 MIU/ML (ref 0.36–3.74)
VIT B12 SERPL-MCNC: 846 PG/ML (ref 193–986)
VIT D+METAB SERPL-MCNC: 77.4 NG/ML (ref 30–100)
VLDLC SERPL CALC-MCNC: 7 MG/DL (ref 0–30)
WBC # BLD AUTO: 3.6 X10(3) UL (ref 4–11)

## 2023-03-06 PROCEDURE — 80061 LIPID PANEL: CPT

## 2023-03-06 PROCEDURE — 85025 COMPLETE CBC W/AUTO DIFF WBC: CPT

## 2023-03-06 PROCEDURE — 82607 VITAMIN B-12: CPT

## 2023-03-06 PROCEDURE — 82306 VITAMIN D 25 HYDROXY: CPT

## 2023-03-06 PROCEDURE — 80053 COMPREHEN METABOLIC PANEL: CPT

## 2023-03-06 PROCEDURE — 84443 ASSAY THYROID STIM HORMONE: CPT | Performed by: INTERNAL MEDICINE

## 2023-03-20 LAB — HPV I/H RISK 1 DNA SPEC QL NAA+PROBE: NEGATIVE

## 2023-03-29 ENCOUNTER — PATIENT MESSAGE (OUTPATIENT)
Dept: INTERNAL MEDICINE CLINIC | Facility: CLINIC | Age: 63
End: 2023-03-29

## 2023-03-31 NOTE — TELEPHONE ENCOUNTER
From: Clarisa Cook  Sent: 3/30/2023 7:40 AM CDT  To: [facility] Clinical Staff  Subject: one more thing. .. Thank you so much. I feel great !

## 2023-04-03 ENCOUNTER — APPOINTMENT (OUTPATIENT)
Dept: HEMATOLOGY/ONCOLOGY | Facility: HOSPITAL | Age: 63
End: 2023-04-03
Attending: INTERNAL MEDICINE
Payer: COMMERCIAL

## 2023-04-10 ENCOUNTER — APPOINTMENT (OUTPATIENT)
Dept: HEMATOLOGY/ONCOLOGY | Facility: HOSPITAL | Age: 63
End: 2023-04-10
Attending: INTERNAL MEDICINE
Payer: COMMERCIAL

## 2023-04-15 ENCOUNTER — HOSPITAL ENCOUNTER (OUTPATIENT)
Dept: MAMMOGRAPHY | Facility: HOSPITAL | Age: 63
Discharge: HOME | End: 2023-04-15
Attending: INTERNAL MEDICINE
Payer: COMMERCIAL

## 2023-04-15 DIAGNOSIS — Z12.31 SCREENING MAMMOGRAM FOR BREAST CANCER: ICD-10-CM

## 2023-04-15 PROCEDURE — 77063 BREAST TOMOSYNTHESIS BI: CPT | Performed by: INTERNAL MEDICINE

## 2023-04-15 PROCEDURE — 77067 SCR MAMMO BI INCL CAD: CPT | Performed by: INTERNAL MEDICINE

## 2023-05-09 ENCOUNTER — OFFICE VISIT (OUTPATIENT)
Dept: GASTROENTEROLOGY | Facility: CLINIC | Age: 63
End: 2023-05-09

## 2023-05-09 ENCOUNTER — TELEPHONE (OUTPATIENT)
Dept: GASTROENTEROLOGY | Facility: CLINIC | Age: 63
End: 2023-05-09

## 2023-05-09 VITALS
WEIGHT: 133 LBS | SYSTOLIC BLOOD PRESSURE: 118 MMHG | HEART RATE: 70 BPM | DIASTOLIC BLOOD PRESSURE: 73 MMHG | BODY MASS INDEX: 21.89 KG/M2 | HEIGHT: 65.5 IN

## 2023-05-09 DIAGNOSIS — K22.70 BARRETT'S ESOPHAGUS WITHOUT DYSPLASIA: Primary | ICD-10-CM

## 2023-05-09 DIAGNOSIS — K21.9 GASTROESOPHAGEAL REFLUX DISEASE, UNSPECIFIED WHETHER ESOPHAGITIS PRESENT: ICD-10-CM

## 2023-05-09 PROCEDURE — 3008F BODY MASS INDEX DOCD: CPT | Performed by: INTERNAL MEDICINE

## 2023-05-09 PROCEDURE — 3074F SYST BP LT 130 MM HG: CPT | Performed by: INTERNAL MEDICINE

## 2023-05-09 PROCEDURE — 99214 OFFICE O/P EST MOD 30 MIN: CPT | Performed by: INTERNAL MEDICINE

## 2023-05-09 PROCEDURE — 3078F DIAST BP <80 MM HG: CPT | Performed by: INTERNAL MEDICINE

## 2023-05-09 NOTE — TELEPHONE ENCOUNTER
Scheduled for:   Jhony Valentin   Provider Name:  Xaun Garibay  Date:  8/8/2023  Location:  [facility]  Sedation:  Mac   Time: 2:15 Pm (pt is aware that Uriel Montiel will call the day before to confirm arrival time)     Prep: Egd - Npo 3 hrs before prior to procedure , Prep instructions were given to pt in the office, I discuss prep Instructions with patient at the time of the appointment which she verbally understood and given the prep instructions at the time of the appointment  Meds/Allergies Reconciled?:  Physician reviewed     Diagnosis with codes:  Marletta Qualia K21.9 , 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 The patient also verbally understands to call her insurance to check for pre-cert, codes were given on prep instructions. Referral sent?:  Referral was sent at the time of electronic surgical scheduling. [facility] or [address] notified?: Electronic case request was sent to Uriel Montiel via [facility]. Medication Orders: This patient verbally confirmed that she is not taking:   Iron, blood thinners, BP meds, and is not diabetic   Not latex allergy, Not PCN allergy and does not have a pacemaker Pt is aware to NOT take iron pills, herbal meds and diet supplements for 7 days before exam. Also to NOT take any form of alcohol, recreational drugs and any forms of ED meds 24 hours before exam.    Misc Orders:  Patient verbally understood & will await a phone call from [facility] to schedule.       Further instructions given by staff:

## 2023-05-09 NOTE — PATIENT INSTRUCTIONS
1.Schedule upper endoscopy (EGD) with monitored anesthesia care (MAC) at [facility] or [facility]

## 2023-06-06 ENCOUNTER — OFFICE VISIT (OUTPATIENT)
Dept: HEMATOLOGY/ONCOLOGY | Facility: HOSPITAL | Age: 63
End: 2023-06-06
Attending: INTERNAL MEDICINE
Payer: COMMERCIAL

## 2023-06-06 VITALS
HEIGHT: 65.5 IN | BODY MASS INDEX: 21.73 KG/M2 | HEART RATE: 62 BPM | WEIGHT: 132 LBS | SYSTOLIC BLOOD PRESSURE: 106 MMHG | DIASTOLIC BLOOD PRESSURE: 70 MMHG | OXYGEN SATURATION: 99 % | TEMPERATURE: 98 F | RESPIRATION RATE: 16 BRPM

## 2023-06-06 DIAGNOSIS — Z17.0 MALIGNANT NEOPLASM OF OVERLAPPING SITES OF RIGHT BREAST IN FEMALE, ESTROGEN RECEPTOR POSITIVE (HCC): Primary | ICD-10-CM

## 2023-06-06 DIAGNOSIS — I89.0 LYMPHEDEMA OF ARM: ICD-10-CM

## 2023-06-06 DIAGNOSIS — Z12.31 SCREENING MAMMOGRAM FOR BREAST CANCER: ICD-10-CM

## 2023-06-06 DIAGNOSIS — C50.811 MALIGNANT NEOPLASM OF OVERLAPPING SITES OF RIGHT BREAST IN FEMALE, ESTROGEN RECEPTOR POSITIVE (HCC): Primary | ICD-10-CM

## 2023-06-06 PROCEDURE — 99213 OFFICE O/P EST LOW 20 MIN: CPT | Performed by: INTERNAL MEDICINE

## 2023-08-07 ENCOUNTER — TELEPHONE (OUTPATIENT)
Facility: CLINIC | Age: 63
End: 2023-08-07

## 2023-08-07 NOTE — TELEPHONE ENCOUNTER
Pt states that she took 2 Advil yesterday around 2pm.  Pt has EGD scheduled for tomorrow. Will this be a problem? Please call.

## 2023-08-07 NOTE — TELEPHONE ENCOUNTER
I spoke to the pt and I answered all of her questions regarding tomorrow's EGD    I let her know it was ok she took the 2 ibuprofen yesterday.  She will not take anymore NSAIDs

## 2023-08-08 ENCOUNTER — LAB REQUISITION (OUTPATIENT)
Dept: SURGERY | Age: 63
End: 2023-08-08
Payer: COMMERCIAL

## 2023-08-08 ENCOUNTER — SURGERY CENTER DOCUMENTATION (OUTPATIENT)
Age: 63
End: 2023-08-08

## 2023-08-08 DIAGNOSIS — K22.70 BARRETT'S ESOPHAGUS WITHOUT DYSPLASIA: Primary | ICD-10-CM

## 2023-08-08 DIAGNOSIS — K21.9 GERD (GASTROESOPHAGEAL REFLUX DISEASE): ICD-10-CM

## 2023-08-08 DIAGNOSIS — K22.70: ICD-10-CM

## 2023-08-08 PROCEDURE — 43239 EGD BIOPSY SINGLE/MULTIPLE: CPT | Performed by: INTERNAL MEDICINE

## 2023-08-08 PROCEDURE — 88312 SPECIAL STAINS GROUP 1: CPT | Performed by: INTERNAL MEDICINE

## 2023-08-08 PROCEDURE — 88305 TISSUE EXAM BY PATHOLOGIST: CPT | Performed by: INTERNAL MEDICINE

## 2023-08-08 NOTE — PROCEDURES
Esophagogastroduodenoscopy (EGD) Report    Ede Petty     1960 Age 58year old   PCP Danny Savage MD Endoscopist Wei Franco MD     Date of procedure: 23    Procedure: EGD w/ biopsy     Pre-operative diagnosis: Cordero's esophagus    Post-operative diagnosis: Cordero's esophagus    Sedation:  monitored anesthesia care (MAC)    Consent: We discussed the risks/benefits and alternatives to this procedure, as well as the planned sedation. Informed consent was obtained from the patient after the risks of the procedure were discussed, including but not limited to bleeding, perforation, aspiration, infection, or possibility of a missed lesion as well as the risks of anesthesia including but not limited to cardiopulmonary complications. The patient signed informed consent and elected to proceed with EGD with intervention [i.e. Biopsy, control of bleeding, dilatation, polypectomy, endoscopic mucosal resection, etc.] as indicated. EGD procedure: The patient was placed in the left lateral decubitus position and begun on continuous blood pressure pulse oximetry and EKG monitoring and this was maintained throughout the procedure. Once an adequate level of sedation was obtained a bite block was placed. Then the lubricated tip of the [id number] diagnostic video upper endoscope was carefully inserted and advanced using direct visualization into the posterior pharynx and ultimately into the esophagus, stomach, and duodenum. Air was then withdrawn and the endoscope was removed. The patient tolerated the procedure well. Estimated blood loss: insignificant    Specimens collected: esophageal biopsies    Complications: none    EGD findings:      1. Esophagus: The squamocolumnar junction was noted at 40 cm and appeared irregular with a 2 cm segment of Cordero's esophagus. The segment was examined carefully and washed and was without any  nodules. Multiple cold forceps biopsies were obtained.  The esophageal mucosa appeared unremarkable. 2. Stomach: The stomach distended normally. Normal rugal folds were seen. The pylorus was patent. There were multiple small and diminutive benign appearing polyps. The gastric mucosa appeared unremarkable otherwise. Retroflexion revealed a normal fundus and cardia. 3. Duodenum: The duodenal mucosa appeared normal in the 1st and 2nd portion of the duodenum. Impression:  Short segment Cordero's esophagus  Multiple small and diminutive benign appearing gastric polyps  Otherwise, unremarkable upper endoscopy    Recommend:  Await pathology results  Continue your current medications  Anticipate EGD in 5 years for surveillance pending pathology results  Follow up in GI clinic on a routine basis    >>>If biopsies were performed and you have not received your pathology results either by phone or letter within 2 weeks, please call our office at [phone].     Raven Hall MD  [address] - Gastroenterology  8/8/2023

## 2023-08-10 ENCOUNTER — TELEPHONE (OUTPATIENT)
Dept: GASTROENTEROLOGY | Facility: CLINIC | Age: 63
End: 2023-08-10

## 2023-08-10 NOTE — TELEPHONE ENCOUNTER
Last EGD done 8/8/23. 5 year recall placed into Pt Outreach, next due on 08/2028 per Dr. Josy Lowe. Specialty comments updated.

## 2023-08-10 NOTE — TELEPHONE ENCOUNTER
GI staff: please place recall for EGD in 5 years     Then close encounter    Thanks    Nuha Cobos MD  [address] - Gastroenterology  8/10/2023  12:28 PM

## 2023-08-21 ENCOUNTER — MED REC SCAN ONLY (OUTPATIENT)
Facility: CLINIC | Age: 63
End: 2023-08-21

## 2023-10-13 ENCOUNTER — TELEPHONE (OUTPATIENT)
Dept: INTERNAL MEDICINE CLINIC | Facility: CLINIC | Age: 63
End: 2023-10-13

## 2023-10-13 NOTE — TELEPHONE ENCOUNTER
Transferred from   Patient fell yesterday in [place]. Broke wrist, has cut and stiches to her face. The hospital where she was yesterday called her today saying they forgot to give her a tetanus shot. Pt inquiring when she last had one. Relayed to patient she last had a TDAP on 2/28/23. Pt verbalized understanding and will let hospital know.

## 2023-10-13 NOTE — TELEPHONE ENCOUNTER
Patient called, she is in [place], had an little accident, they are recommending a tetanus vaccine  Patient checking for date of last tetanus vaccine in office?   Tasked to nursing

## 2023-10-16 PROBLEM — B34.9 VIRAL INFECTION: Status: ACTIVE | Noted: 2023-10-16

## 2023-10-16 PROBLEM — C50.919 MALIGNANT NEOPLASM OF BREAST (HCC): Status: ACTIVE | Noted: 2023-10-16

## 2023-10-16 PROBLEM — M25.532 ACUTE PAIN OF LEFT WRIST: Status: ACTIVE | Noted: 2023-10-16

## 2023-10-16 PROBLEM — S52.542A CLOSED SMITH'S FRACTURE OF LEFT RADIUS: Status: ACTIVE | Noted: 2023-10-16

## 2023-12-11 ENCOUNTER — NURSE ONLY (OUTPATIENT)
Dept: HEMATOLOGY/ONCOLOGY | Facility: HOSPITAL | Age: 63
End: 2023-12-11
Attending: GENETIC COUNSELOR, MS
Payer: COMMERCIAL

## 2023-12-11 ENCOUNTER — GENETICS ENCOUNTER (OUTPATIENT)
Dept: GENETICS | Facility: HOSPITAL | Age: 63
End: 2023-12-11
Attending: GENETIC COUNSELOR, MS
Payer: COMMERCIAL

## 2023-12-11 DIAGNOSIS — Z85.3 PERSONAL HISTORY OF MALIGNANT NEOPLASM OF BREAST: Primary | ICD-10-CM

## 2023-12-11 DIAGNOSIS — Z80.3 FAMILY HISTORY OF MALIGNANT NEOPLASM OF BREAST: ICD-10-CM

## 2023-12-11 DIAGNOSIS — Z80.0 FAMILY HISTORY OF MALIGNANT NEOPLASM OF GASTROINTESTINAL TRACT: ICD-10-CM

## 2023-12-11 PROCEDURE — 96040 HC GENETIC COUNSELING EA 30 MIN: CPT | Performed by: GENETIC COUNSELOR, MS

## 2023-12-11 PROCEDURE — 36415 COLL VENOUS BLD VENIPUNCTURE: CPT

## 2023-12-18 ENCOUNTER — TELEPHONE (OUTPATIENT)
Dept: GENETICS | Facility: HOSPITAL | Age: 63
End: 2023-12-18

## 2023-12-18 NOTE — TELEPHONE ENCOUNTER
Shared genetic testing results with Pierre Gilbert over phone. 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 Released results to her through lab's portal and will mail her a copy. All her questions were answered to the best of my ability and she was appreciative of the call and the results.

## 2024-01-30 ENCOUNTER — TELEPHONE (OUTPATIENT)
Facility: CLINIC | Age: 64
End: 2024-01-30

## 2024-01-30 NOTE — TELEPHONE ENCOUNTER
Patient outreach message received:    3 year colonoscopy recall entered into patient outreach in [facility]. Next colonoscopy will be due 5/11/2024 per below message from Dr. Amezuca.     Recall reminder letter mailed out to patient.

## 2024-03-12 ENCOUNTER — OFFICE VISIT (OUTPATIENT)
Dept: INTERNAL MEDICINE CLINIC | Facility: CLINIC | Age: 64
End: 2024-03-12

## 2024-03-12 VITALS
SYSTOLIC BLOOD PRESSURE: 112 MMHG | OXYGEN SATURATION: 98 % | DIASTOLIC BLOOD PRESSURE: 62 MMHG | HEIGHT: 65 IN | HEART RATE: 68 BPM | WEIGHT: 135.13 LBS | BODY MASS INDEX: 22.51 KG/M2

## 2024-03-12 DIAGNOSIS — C50.811 MALIGNANT NEOPLASM OF OVERLAPPING SITES OF RIGHT BREAST IN FEMALE, ESTROGEN RECEPTOR POSITIVE (HCC): ICD-10-CM

## 2024-03-12 DIAGNOSIS — Z79.899 CURRENT USE OF PROTON PUMP INHIBITOR: ICD-10-CM

## 2024-03-12 DIAGNOSIS — C68.9 UROTHELIAL CARCINOMA (HCC): ICD-10-CM

## 2024-03-12 DIAGNOSIS — Z23 IMMUNIZATION DUE: ICD-10-CM

## 2024-03-12 DIAGNOSIS — Z17.0 MALIGNANT NEOPLASM OF OVERLAPPING SITES OF RIGHT BREAST IN FEMALE, ESTROGEN RECEPTOR POSITIVE (HCC): ICD-10-CM

## 2024-03-12 DIAGNOSIS — E53.8 VITAMIN B12 DEFICIENCY: ICD-10-CM

## 2024-03-12 DIAGNOSIS — M85.80 OSTEOPENIA DETERMINED BY X-RAY: ICD-10-CM

## 2024-03-12 DIAGNOSIS — Z00.00 MEDICARE WELCOME EXAM: ICD-10-CM

## 2024-03-12 DIAGNOSIS — Z00.00 MEDICARE ANNUAL WELLNESS VISIT, SUBSEQUENT: Primary | ICD-10-CM

## 2024-03-12 DIAGNOSIS — K63.5 MULTIPLE POLYPS OF SIGMOID COLON: ICD-10-CM

## 2024-03-12 DIAGNOSIS — Z00.00 ROUTINE HEALTH MAINTENANCE: ICD-10-CM

## 2024-03-12 DIAGNOSIS — M81.0 AGE-RELATED OSTEOPOROSIS WITHOUT CURRENT PATHOLOGICAL FRACTURE: ICD-10-CM

## 2024-03-12 LAB
ATRIAL RATE: 61 BPM
P AXIS: 70 DEGREES
P-R INTERVAL: 160 MS
Q-T INTERVAL: 414 MS
QRS DURATION: 82 MS
QTC CALCULATION (BEZET): 416 MS
R AXIS: 0 DEGREES
T AXIS: 37 DEGREES
VENTRICULAR RATE: 61 BPM

## 2024-03-12 NOTE — PROGRESS NOTES
Nancy Ba is a 64 year old female.  Chief Complaint   Patient presents with    Physical     Here today for a Medicare Annual Exam (Supervisit)        HPI:   Nancy Ba is a 64 year old female who presents for a Welcome to Medicare Exam and physical     Her son was diagnosed with colon cancer, stage 2 in 2022 at age 32; needed surgery only.  Doing fine.    Exercises regularly -- walks, rides stationary, does weights. Always active.    Sister with stage 1 breast cancer  Other sister with stage 1 lung cancer (remote smoker); discovered incidentally.    SocHx:  Working as a  at [employer].   retired from his [employer] job, so pt thinking about cutting her hours so they can travel more.        Wt Readings from Last 6 Encounters:   03/12/24 135 lb 2 oz (61.3 kg)   10/16/23 132 lb (59.9 kg)   06/06/23 132 lb (59.9 kg)   05/09/23 133 lb (60.3 kg)   02/28/23 133 lb 9.6 oz (60.6 kg)   06/15/22 132 lb (59.9 kg)     Body mass index is 22.49 kg/m².       Current Outpatient Medications   Medication Sig Dispense Refill    HYDROcodone-acetaminophen 5-325 MG Oral Tab Take 1 tablet by mouth.      acetaminophen 325 MG Rectal Suppos Place 1 suppository (325 mg total) rectally every 4 (four) hours as needed for Fever.      lisinopril 10 MG Oral Tab Take 1 tablet (10 mg total) by mouth daily. 90 tablet 3    pantoprazole 40 MG Oral Tab EC Take 1 tablet (40 mg total) by mouth before breakfast. 90 tablet 3    escitalopram 10 MG Oral Tab Take 1 tablet (10 mg total) by mouth daily. 90 tablet 3    cyanocobalamine 1000 MCG Oral Tab Take 1 tablet (1,000 mcg total) by mouth every other day. 1 tablet 0    Cholecalciferol (VITAMIN D) 2000 units Oral Cap Take 1 capsule (2,000 Units total) by mouth daily.      Calcium Carbonate-Vitamin D (CALCIUM + D OR) Take 1 tablet by mouth daily.        Past Medical History:   Diagnosis Date    Adenomatous polyp of sigmoid colon     Anxiety state     Cordero's esophagus  2019    Breast cancer (HCC) DX 2007    RIGHT BREAST,     Cervical dysplasia     Colon polyps 5/15/2021    A. Cecum polyp: Fragments of tubular adenoma.  B. Transverse colon polyp: Multiple fragments of sessile serrated adenoma.    Depression     Herpes zoster     Hydronephrosis     Lymphedema of arm     Right arm    Malignant neoplasm of overlapping sites of right breast in female, estrogen receptor positive (HCC) 2007    Multiple polyps of sigmoid colon 2016    Osteopenia determined by x-ray 2014    Osteoporosis screening 2014    DEXA SCAN:  The patient's bone density is within the osteoporotic range. 2014:    Other specified disorders of uterus, not elsewhere classified     endometrial abnormality    Pregnancy ([facility]) , , 1992    x 3 vag vacuum, sp ,     Problems with swallowing     Radicular pain     HERPES ZOSTER / MARCAINE DEPO MEDROL T10-12    Reactive depression 2014    Renal disorder     right side ureter and kidney removal     Urothelial carcinoma (HCC) 3/21/2014    right      Past Surgical History:   Procedure Laterality Date    BREAST LUMPECTOMY      /EBR/chemo - cytoxan=taxotere    CHEMOTHERAPY  ,    COLONOSCOPY N/A 2016    Procedure: COLONOSCOPY;  Surgeon: Jaime Amezcua MD;  Location: [facility] ENDOSCOPY    COLONOSCOPY N/A 2018    Procedure: COLONOSCOPY;  Surgeon: Jaime Amezcua MD;  Location: [facility] ENDOSCOPY    COLONOSCOPY N/A 2021    Procedure: COLONOSCOPY;  Surgeon: Jaime Amezcua MD;  Location: [facility] ENDOSCOPY    D & C      EGD  2019    EGD  2020    EGD  2023    HYSTERECTOMY  2010    KIDNEY SURGERY Right     nephrectomy/ureter removed     LUMPECTOMY RIGHT  2008    COLT BIOPSY STEREO NODULE 2 SITE BILAT (CPT=19081/19999)      NEEDLE BIOPSY LEFT      OTHER SURGICAL HISTORY      CONE BIOPSY     OTHER SURGICAL HISTORY      D&C due to endometrial abnormality    OTHER  SURGICAL HISTORY  1991    HAND SURGERY     OTHER SURGICAL HISTORY  2004    D&C, TUBALLIGATION     PORT, INDWELLING, IMP      RADIATION RIGHT  2008    TOTAL ABDOMINAL HYSTERECTOMY  2010    BSO      Family History   Problem Relation Age of Onset    Heart Disorder Father     Gastro-Intestinal Disorder Mother         diverticulitis    Diabetes Son     Cancer Son 31        colon ca    Diabetes Son     Diabetes Maternal Grandmother     Cancer Maternal Grandfather         gastric ca; non-smoker    Breast Cancer Sister 66    Genito-Urinary Disorder Sister         urolithiasis    Diabetes Sister     Cancer Sister 63        lung ca; fmr smoker    Breast Cancer Maternal Cousin Female 50        post menopause    Breast Cancer Maternal Cousin Female 40    Breast Cancer Self 47        2008    Cancer Maternal Cousin Female         lymphoma      Social History:   Social History     Socioeconomic History    Marital status:    Tobacco Use    Smoking status: Former    Smokeless tobacco: Never    Tobacco comments:     Quit age 25   Vaping Use    Vaping Use: Never used   Substance and Sexual Activity    Alcohol use: Yes     Alcohol/week: 5.0 standard drinks of alcohol     Types: 1 Glasses of wine, 4 Standard drinks or equivalent per week     Comment: SOCIALLY    Drug use: No    Sexual activity: Yes     Birth control/protection: Hysterectomy   Other Topics Concern    Caffeine Concern Yes     Comment: COFFEE 1 CUP/DAILY          Previously smoked less than 1/2 ppd (socially) until age 25        General Health     In the past six months, have you lost more than 10 pounds without trying?: 2 - No    Has your appetite been poor?: No    Type of Diet: Balanced    How does the patient maintain a good energy level?: Appropriate Exercise;Daily Walks    How would you describe your daily physical activity?: Moderate    How would you describe your current health state?: Good    How do you maintain positive mental well-being?: Social  Interaction;Visiting Friends;Visiting Family         Have you had any immunizations at another office such as Influenza, Hepatitis B, Tetanus, or Pneumococcal?: No     Functional Ability     Bathing or Showering: Able without help    Toileting: Able without help    Dressing: Able without help    Eating: Able without help    Driving: Able without help         Managing money/bills: Able without help    Taking medications as prescribed: Able without help    Are you able to afford your medications?: Yes    Hearing Problems?: No     Functional Status     Hearing Problems?: No    Vision Problems? : No    Difficulty walking?: No    Difficulty dressing or bathing?: No    Problems with daily activities? : No    Memory Problems?: No      Fall/Risk Assessment                                                              Depression Screening (PHQ-2/PHQ-9): Over the LAST 2 WEEKS                      Advance Directives     Do you have a healthcare power of ?: No    Do you have a living will?: No     Hearing Assessment (Required for AWV/SWV)      Hearing Screening    Screening Method: Whisper Test  Whisper Test Result: Pass               Visual Acuity     Right Eye Visual Acuity: Corrected Left Eye Visual Acuity: Corrected   Right Eye Chart Acuity: 20/20 Left Eye Chart Acuity: 20/20     Cognitive Assessment     What day of the week is this?: Correct    What month is it?: Correct    What year is it?: Correct    Recall \"Ball\": Correct    Recall \"Flag\": Correct    Recall \"Tree\": Correct        Nancy Ba's SCREENING SCHEDULE   Tests on this list are recommended by your physician but may not be covered, or covered at this frequency, by your insurer. Please check with your insurance carrier before scheduling to verify coverage.   PREVENTATIVE SERVICES  INDICATIONS AND SCHEDULE Internal Lab or Procedure External Lab or Procedure   Diabetes Screening      HbgA1C   Annually No results found for: \"A1C\"      No data to display                 Fasting Blood Sugar (FSB)Annually Glucose (mg/dL)   Date Value   03/06/2023 89         No data to display               Cardiovascular Disease Screening     LDL Annually LDL Cholesterol (mg/dL)   Date Value   03/06/2023 86        EKG One Time y    Colorectal Cancer Screening      Colonoscopy Screen every 10 years Health Maintenance   Topic Date Due    Colorectal Cancer Screening  05/11/2024    Update Health Maintenance if applicable    Flex Sigmoidoscopy Screen every 5 years No results found for this or any previous visit.      No data to display                 Fecal Occult Blood Annually No results found for: \"FOB\", \"OCCULTSTOOL\"      No data to display                Glaucoma Screening      Ophthalmology Visit Annually y    Bone Density Screening      Dexascan Every two years Last Dexa Scan:    XR DEXA BONE DENSITOMETRY (CPT=77080) 07/13/2022        No data to display               Pap and Pelvic      Pap: Every 3 yrs age 21-65 or Pap+HPV every 5 yrs age 30-65, age 65 and older at high risk   No recommendations at this time Update Health Maintenance if applicable    Chlamydia  Annually if high risk No results found for: \"CHLAMYDIA\"      No data to display                Screening Mammogram      Mammogram  Annually Health Maintenance   Topic Date Due    Mammogram  04/15/2024    Update Health Maintenance if applicable   Immunizations      Influenza No orders found for this or any previous visit. Update Immunization Activity if applicable    Pneumococcal No orders found for this or any previous visit. Update Immunization Activity if applicable    Hepatitis B No orders found for this or any previous visit. Update Immunization Activity if applicable    Tetanus Orders placed or performed in visit on 02/28/23    TETANUS, DIPHTHERIA TOXOIDS AND ACELLULAR PERTUSIS VACCINE (TDAP), >7 YEARS, IM USE    Update Immunization Activity if applicable    Zoster (Not covered by Medicare Part B) No orders found for this  or any previous visit. Update Immunization Activity if applicable     SPECIFIC DISEASE MONITORING Internal Lab or Procedure External Lab or Procedure   Annual Monitoring of Persistent     Medications (ACE/ARB, digoxin, diuretics)    Potassium  Annually Potassium (mmol/L)   Date Value   03/06/2023 5.0     POTASSIUM (P) (mmol/L)   Date Value   09/19/2016 4.2         No data to display                Creatinine  Annually Creatinine (mg/dL)   Date Value   03/06/2023 1.01         No data to display                Digoxin Serum Conc  Annually No results found for: \"DIGOXIN\"      No data to display                Diabetes      HgbA1C  Annually No results found for: \"A1C\"      No data to display                Creat/alb ratio  Annually      LDL  Annually LDL Cholesterol (mg/dL)   Date Value   03/06/2023 86         No data to display                 Dilated Eye exam  Annually      No data to display                   No data to display                COPD      Spirometry Testing Annually No results found for this or any previous visit.      No data to display                        REVIEW OF SYSTEMS:   GENERAL: feels well otherwise  SKIN: denies any unusual skin lesions  EYES:denies blurred vision or double vision  HEENT: denies nasal congestion, sinus pain or ST  LUNGS: denies shortness of breath with exertion or cough  CARDIOVASCULAR: denies chest pain, pressure, or palpitations  GI: denies abdominal pain, nausea, vomiting, diarrhea, constipation, hematochezia, or melena  NEURO: denies headaches or dizziness    EXAM:   /62   Pulse 68   Ht 5' 5\" (1.651 m)   Wt 135 lb 2 oz (61.3 kg)   SpO2 98%   BMI 22.49 kg/m²     GENERAL: well developed, well nourished, in no apparent distress  HEENT: normal oropharynx, normal TM's  EYES: PERRLA, EOMI, conjunctivae are pink  NECK: supple, no cervical or supraclavicular LAD, no carotid bruits  BREAST: no dominant or suspicious mass, no axillary LAD  LUNGS: clear to  auscultation  CARDIO: RRR, normal S1S2, no gallops or murmurs  GI: soft, NT, ND, NABS, no HSM  EXTREMITIES: no cyanosis, clubbing or edema, +2 DP pulses        ASSESSMENT AND PLAN:     Routine health maintenance  Mammo per Dr. Hopkins    Historically had Paps per Dr. Boswell.  Has not done since 2016; done today in the office (pt is s/p AARON/BSO); no further screening indicated    Had both Shingrix vaccine.    Tdap  2/28/23  PCV 20 today 3/12/24  EKG (for [facility] physical) normal    Essential hypertension  -on lisinopril 10mg/day   -BP well controlled    Hx of adenomatous colon polyps  -Colonoscopy 2/20/18 -- 3 small polyps (Dr. Amezcua)  -3 small polyps on colonoscopy 5/2/21 (Dr. Amezcua) -- repeat in 3 years (2024).  -pt will call to schedule    Malignant neoplasm of overlapping sites of right breast in female, estrogen receptor positive (HCC)  -dx'ed 12/14/07; Stage IIA (T1b, N1, Mx).    -s/p R lumpectomy, sentinel LN bx 1/4/08 at [facility] (Dr. Heather Almazan); (invasive ductal carcinoma)  -s/p chemo 2/2008 and then RT  -BRCA negative  -s/p tamoxifen x 2 years - then AARON/BSO - then changed to arimidex in 2010; stopped in 6/2023  -has lymphedema of R hand; wears a glove  -port removed in 2023  -has order for mammo; sees onc Dr. Hopkins -- pt opting not to see anymore given remoteness of dx and being off arimidex  -genetic testing in 12/2023 negative    Urothelial carcinoma (HCC)  dx'ed 12/2013 (presented with gross hematuria); s/p chemo 1/2014-2/2014; s/p R nephroureterectomy and pelvic node dissection at [facility] (Dr. Noé Johnson).    Followed annually by new urologist at [facility] for cysto, CXR, CT abd/pelvis.    Cysto 2021 was neg.  Pt is due for repeat cysto (Dr. HERNAN Zamora -- Dr. Johnson left the practice); pt will call.    Osteopenia, multiple sites   Did not tolerate Fosamax due to severe reflux (had subsequent EGD 7/2019).    Was receiving Zometa q 6 months per Dr. Heredia (last in 2/2022); stopped by Dr. Hpokins.      Repeat DEXA 6/2020 and again 7/2022 both showed improvement in bone density.   Repeat DEXA ordered    Cordero's esophagus  Had EGD 7/1/19 -- short segment Cordero's esophagus. On protonix.   Last EGD 8/8/23 (Dr. Valladares) -- next in 5 years    Mild depression  Stable on lexapro 10mg/day    Vitamin B12 deficiency  Check level  Cont 1000 mcg every other day    RTC 1 yr    Marcella Huitron MD, 03/12/24, 2:37 PM

## 2024-03-12 NOTE — TELEPHONE ENCOUNTER
Patient came into Dr. Huitron' / PCP office today ([facility]) for her Initial Medicare Annual Physical and was also reminded at this time to set-up her Colonoscopy. FYI to Gastro Team

## 2024-03-20 ENCOUNTER — TELEPHONE (OUTPATIENT)
Facility: CLINIC | Age: 64
End: 2024-03-20

## 2024-03-20 DIAGNOSIS — Z80.0 FAMILY HISTORY OF MALIGNANT NEOPLASM OF GASTROINTESTINAL TRACT: ICD-10-CM

## 2024-03-20 DIAGNOSIS — Z86.010 PERSONAL HISTORY OF COLONIC POLYPS: Primary | ICD-10-CM

## 2024-03-20 RX ORDER — SODIUM, POTASSIUM,MAG SULFATES 17.5-3.13G
SOLUTION, RECONSTITUTED, ORAL ORAL
Qty: 1 EACH | Refills: 0 | Status: SHIPPED | OUTPATIENT
Start: 2024-03-20

## 2024-03-20 NOTE — TELEPHONE ENCOUNTER
Dr Sandoval    Called patient for CLN recall  Medications, pharmacy, and allergies verified   Please advise on colonoscopy and bowel prep orders.     › Insurance:  [employer]AGE  › Last PCP Visit: 3/12/2024  › Last CBC drawn: 3/6/2023  › H/W/BMI: 5'5.5/ 130 lbs/21.63    Special comments/notes:  Recall details:     Last Procedure, Date, MD:    5/11/2021 CLN by Dr MOORE   Last diagnosis:  Fragments of tubular adenoma, multiple fragments of sessile serrated adenoma   Recalled for (mth/yrs):  3 yrs (May 2024)   Sedation used previously:  MAC   Last Prep Used:  suprep   Quality of Prep:  Very good     Telephone colon screening Questionnaires:  Yes No   Are you currently experiencing any new GI symptoms? [] [x]   If yes, symptom details:     Rectal Bleeding with or without bowel movements: [] [x]   Black stool: [] [x]   Dysphagia &Food feeling/getting stuck: [] [x]   Intractable Vomiting: [] [x]   Unexplained weight loss: [] [x]   First colonoscopy? [] [x]   Family history of colon cancer? Son dx colon cancer in 2022 [x] []   Any issues with anesthesia? [] [x]   If yes, explain details:      Personal history of Resp. Issues/Oxygen Use/SUSAN/COPD? [] [x]   If yes, CPAP/BiPAP? [] []   History of devices Pacemaker/Defibrillator/Stents? [] [x]   History of Cardiac/CVA issues/(MI/Stroke): HTN [x] []     Medication usage:  Yes  No   Anticoagulants:  Anticoagulant (Except Aspirin) ? Route to RN staff to obtain ordering provider orders [] [x]   Diabetic Meds:   PO DM Meds ? Hold day prior and day of procedure  Insulin ? Route to RN clinical staff to obtain provider orders  [] [x]   Weight loss meds (phentermine/Vyvanse/Saxsenda): [] [x]   Iron/Herbal/Multivitamin Supplement (RX/OTC): [x] []   Usage of marijuana, CBD &/or vape products: [] [x]    Jaime Amezcua MD   Physician  Gastroenterology     Operative Report     Signed     Date of Service: 5/11/2021  3:55 PM  Case Time: Procedures: Surgeons:   5/11/2021  4:02 PM  COLONOSCOPY    Jaime Amezcua MD      Signed         [facility] Endoscopy Report     Date of Procedure:  05/11/21     Preoperative Diagnosis:  1.  Colorectal cancer screening  2.  Personal history of adenomatous colon polyps     Postoperative Diagnosis:  1. Colon polyps  2. Pancolonic diverticulosis     Procedure:    Colonoscopy with polypectomy     Surgeon:  Jaime Amezcua M.D.     Anesthesia:  Monitored anesthesia care  Cecal withdrawal time:  19 minutes  EBL:  Insignificant     Brief History:  This is a 61 year old female who presents for a screening/surveillance colonoscopy in the setting of a history of adenomatous colon polyps including an advanced adenoma based on size and histology.  The patient's last colonoscopy is approaching 3-1/2 years prior.  She has had no lower gastrointestinal tract symptoms or signs.     Technique:  After informed consent, the patient was placed in the left lateral recumbent position.  Digital rectal examination revealed no palpable intraluminal abnormalities.  An Olympus variable stiffness 190 series HD colonoscope was inserted into the rectum and advanced under direct vision by following the lumen to the splenic flexure. The patient was made supine and the instrument advanced to the terminal ileum.  The colon was examined upon withdrawal in the supine position.       Findings:  The preparation of the colon was very good.  The terminal ileum was examined for 5 cm and visually normal.  The ileocecal valve was well preserved. The visualized colonic mucosa from the cecum to the anal verge was normal with an intact vascular pattern. There were #3 polyps seen within the colon which removed as follows:     1. In the cecum well away from the aforementioned polypectomy site there was a 3-4 mm sessile polyp which was cold snare excised and retrieved.  2. In the proximal transverse colon there is a 6-7 mm serrated sessile polyp which was cold snare excised and  retrieved.  3. In the sigmoid colon there was a 3-4 mm sessile polyp which was cold snare excised. Was uncertain whether this polyp was retrieved.     Inspection of all sites revealed no evidence of ongoing bleeding. Polypectomy sites in the cecum and proximal ascending colon were evidenced by flat white scar and no signs of recurrent polyp. There were scattered diverticula seen throughout the colon without current signs of complication. There were no other colonic polyps, mass lesions, vascular anomalies or signs of inflammation seen.  Retroflexion in the rectum revealed no abnormalities.  The procedure was well tolerated without immediate complication.     Impression:  1. Colon polyps  2. Pancolonic diverticulosis, currently uncomplicated     Recommendations:  1. High-fiber diet.  2. Follow-up biopsy results. Surveillance colonoscopy in 3-5 years pending on the number of adenomatous polyps.     Jaime Amezcua MD  5/11/2021                     Jaime Amezcua MD  5/18/2021  7:03 PM CDT       As per ALYSON parameters I left a message on the patient's voicemail.  She had #2 subcentimeter adenomatous polyps removed.  The third polyp was subcentimeter, visually benign and removed but not retrieved.  I am recommending a surveillance colonoscopy in 3 years along with a high-fiber diet for diverticulosis.  I have asked that the patient contact me with any questions.    GI RNs: Please enter colonoscopy recall for 3 years.  RICARDO Naranjo          Final Diagnosis:      A. Cecum polyp:  Fragments of tubular adenoma.     B. Transverse colon polyp:  Multiple fragments of sessile serrated adenoma.

## 2024-03-20 NOTE — TELEPHONE ENCOUNTER
GI Schedulers    Please call the patient to schedule for colonoscoopy    See MD orders below    Thanks

## 2024-03-20 NOTE — TELEPHONE ENCOUNTER
May schedule a colonoscopy for a history of colon polyps and a family history of colon cancer following a split dose Suprep and monitored anesthesia care.

## 2024-03-21 ENCOUNTER — LAB ENCOUNTER (OUTPATIENT)
Dept: LAB | Age: 64
End: 2024-03-21
Attending: INTERNAL MEDICINE
Payer: MEDICARE

## 2024-03-21 DIAGNOSIS — Z00.00 MEDICARE ANNUAL WELLNESS VISIT, SUBSEQUENT: ICD-10-CM

## 2024-03-21 DIAGNOSIS — Z17.0 MALIGNANT NEOPLASM OF OVERLAPPING SITES OF RIGHT BREAST IN FEMALE, ESTROGEN RECEPTOR POSITIVE (HCC): ICD-10-CM

## 2024-03-21 DIAGNOSIS — C50.811 MALIGNANT NEOPLASM OF OVERLAPPING SITES OF RIGHT BREAST IN FEMALE, ESTROGEN RECEPTOR POSITIVE (HCC): ICD-10-CM

## 2024-03-21 DIAGNOSIS — E53.8 VITAMIN B12 DEFICIENCY: ICD-10-CM

## 2024-03-21 DIAGNOSIS — M81.0 AGE-RELATED OSTEOPOROSIS WITHOUT CURRENT PATHOLOGICAL FRACTURE: ICD-10-CM

## 2024-03-21 LAB
ALBUMIN SERPL-MCNC: 3.7 G/DL (ref 3.4–5)
ALBUMIN/GLOB SERPL: 1.2 {RATIO} (ref 1–2)
ALP LIVER SERPL-CCNC: 47 U/L
ALT SERPL-CCNC: 15 U/L
ANION GAP SERPL CALC-SCNC: 4 MMOL/L (ref 0–18)
AST SERPL-CCNC: 12 U/L (ref 15–37)
BASOPHILS # BLD AUTO: 0.05 X10(3) UL (ref 0–0.2)
BASOPHILS NFR BLD AUTO: 1.1 %
BILIRUB SERPL-MCNC: 0.7 MG/DL (ref 0.1–2)
BUN BLD-MCNC: 18 MG/DL (ref 9–23)
CALCIUM BLD-MCNC: 9.6 MG/DL (ref 8.5–10.1)
CHLORIDE SERPL-SCNC: 106 MMOL/L (ref 98–112)
CHOLEST SERPL-MCNC: 198 MG/DL (ref ?–200)
CO2 SERPL-SCNC: 28 MMOL/L (ref 21–32)
CREAT BLD-MCNC: 1.1 MG/DL
EGFRCR SERPLBLD CKD-EPI 2021: 56 ML/MIN/1.73M2 (ref 60–?)
EOSINOPHIL # BLD AUTO: 0.14 X10(3) UL (ref 0–0.7)
EOSINOPHIL NFR BLD AUTO: 3.2 %
ERYTHROCYTE [DISTWIDTH] IN BLOOD BY AUTOMATED COUNT: 13.2 %
FASTING PATIENT LIPID ANSWER: YES
FASTING STATUS PATIENT QL REPORTED: YES
GLOBULIN PLAS-MCNC: 3.1 G/DL (ref 2.8–4.4)
GLUCOSE BLD-MCNC: 96 MG/DL (ref 70–99)
HCT VFR BLD AUTO: 36.8 %
HDLC SERPL-MCNC: 73 MG/DL (ref 40–59)
HGB BLD-MCNC: 12.1 G/DL
IMM GRANULOCYTES # BLD AUTO: 0.01 X10(3) UL (ref 0–1)
IMM GRANULOCYTES NFR BLD: 0.2 %
LDLC SERPL CALC-MCNC: 116 MG/DL (ref ?–100)
LYMPHOCYTES # BLD AUTO: 1.65 X10(3) UL (ref 1–4)
LYMPHOCYTES NFR BLD AUTO: 37.8 %
MCH RBC QN AUTO: 30.2 PG (ref 26–34)
MCHC RBC AUTO-ENTMCNC: 32.9 G/DL (ref 31–37)
MCV RBC AUTO: 91.8 FL
MONOCYTES # BLD AUTO: 0.35 X10(3) UL (ref 0.1–1)
MONOCYTES NFR BLD AUTO: 8 %
NEUTROPHILS # BLD AUTO: 2.16 X10 (3) UL (ref 1.5–7.7)
NEUTROPHILS # BLD AUTO: 2.16 X10(3) UL (ref 1.5–7.7)
NEUTROPHILS NFR BLD AUTO: 49.7 %
NONHDLC SERPL-MCNC: 125 MG/DL (ref ?–130)
OSMOLALITY SERPL CALC.SUM OF ELEC: 288 MOSM/KG (ref 275–295)
PLATELET # BLD AUTO: 243 10(3)UL (ref 150–450)
POTASSIUM SERPL-SCNC: 4.7 MMOL/L (ref 3.5–5.1)
PROT SERPL-MCNC: 6.8 G/DL (ref 6.4–8.2)
RBC # BLD AUTO: 4.01 X10(6)UL
SODIUM SERPL-SCNC: 138 MMOL/L (ref 136–145)
TRIGL SERPL-MCNC: 47 MG/DL (ref 30–149)
TSI SER-ACNC: 1.43 MIU/ML (ref 0.36–3.74)
VIT B12 SERPL-MCNC: 966 PG/ML (ref 193–986)
VIT D+METAB SERPL-MCNC: 77.7 NG/ML (ref 30–100)
VLDLC SERPL CALC-MCNC: 8 MG/DL (ref 0–30)
WBC # BLD AUTO: 4.4 X10(3) UL (ref 4–11)

## 2024-03-21 PROCEDURE — 80053 COMPREHEN METABOLIC PANEL: CPT

## 2024-03-21 PROCEDURE — 82607 VITAMIN B-12: CPT

## 2024-03-21 PROCEDURE — 84443 ASSAY THYROID STIM HORMONE: CPT | Performed by: INTERNAL MEDICINE

## 2024-03-21 PROCEDURE — 82306 VITAMIN D 25 HYDROXY: CPT

## 2024-03-21 PROCEDURE — 80061 LIPID PANEL: CPT

## 2024-03-21 PROCEDURE — 85025 COMPLETE CBC W/AUTO DIFF WBC: CPT

## 2024-03-21 RX ORDER — ESCITALOPRAM OXALATE 10 MG/1
10 TABLET ORAL DAILY
Qty: 90 TABLET | Refills: 0 | Status: SHIPPED | OUTPATIENT
Start: 2024-03-21

## 2024-03-21 NOTE — TELEPHONE ENCOUNTER
Pt still needs to complete fasting labs     Refill request is for a maintenance medication and has met the criteria specified in the Ambulatory Medication Refill Standing Order for eligibility, visits, laboratory, alerts and was sent to the requested pharmacy.    Requested Prescriptions     Signed Prescriptions Disp Refills    escitalopram 10 MG Oral Tab 90 tablet 0     Sig: Take 1 tablet (10 mg total) by mouth daily. Complete fasting labs     Authorizing Provider: MARBELLA CURTIS     Ordering User: KAREN HAYWOOD

## 2024-03-22 NOTE — TELEPHONE ENCOUNTER
Scheduled for:  Colonoscopy [id number]  Provider Name:  Dr. Amezcua  Date:  8/6/2024  Location:  [place]  Sedation:  MAC  Time:  10:30am, (pt is aware to arrive at 9:30am)   Prep:  Suprep  Meds/Allergies Reconciled?:  Physician reviewed     Diagnosis with codes:  history of colon polyps Z86.010 and a family history of colon cancer Z80.0  Was patient informed to call insurance with codes (Y/N):  Yes, I confirmed MEDICARE insurance with this patient.      Referral sent?:  Referral was sent at the time of electronic surgical scheduling.   [place] or [facility] notified?:  I sent an electronic request to Endo Scheduling and received a confirmation today.      Medication Orders:  n/a  Misc Orders:  n/a     Further instructions given by staff:   I discussed the prep instructions with the patient which she verbally understood and is aware that I will mail the instructions today.

## 2024-04-18 RX ORDER — LISINOPRIL 10 MG/1
10 TABLET ORAL DAILY
Qty: 90 TABLET | Refills: 3 | Status: SHIPPED | OUTPATIENT
Start: 2024-04-18

## 2024-04-18 NOTE — TELEPHONE ENCOUNTER
Refill request is for a maintenance medication and has met the criteria specified in the Ambulatory Medication Refill Standing Order for eligibility, visits, laboratory, alerts and was sent to the requested pharmacy.    Requested Prescriptions     Signed Prescriptions Disp Refills    lisinopril 10 MG Oral Tab 90 tablet 3     Sig: TAKE ONE TABLET BY MOUTH ONE TIME DAILY     Authorizing Provider: MARBLELA CURTIS     Ordering User: BEATA CELIS

## 2024-05-03 ENCOUNTER — HOSPITAL ENCOUNTER (OUTPATIENT)
Dept: MAMMOGRAPHY | Age: 64
Discharge: HOME | End: 2024-05-03
Attending: INTERNAL MEDICINE
Payer: MEDICARE

## 2024-05-03 DIAGNOSIS — Z12.31 SCREENING MAMMOGRAM FOR BREAST CANCER: ICD-10-CM

## 2024-05-03 PROCEDURE — 77063 BREAST TOMOSYNTHESIS BI: CPT | Performed by: INTERNAL MEDICINE

## 2024-05-03 PROCEDURE — 77067 SCR MAMMO BI INCL CAD: CPT | Performed by: INTERNAL MEDICINE

## 2024-05-08 RX ORDER — PANTOPRAZOLE SODIUM 40 MG/1
40 TABLET, DELAYED RELEASE ORAL
Qty: 90 TABLET | Refills: 3 | Status: SHIPPED | OUTPATIENT
Start: 2024-05-08

## 2024-05-08 NOTE — TELEPHONE ENCOUNTER
Refill request is for a maintenance medication and has met the criteria specified in the Ambulatory Medication Refill Standing Order for eligibility, visits, laboratory, alerts and was sent to the requested pharmacy.    Requested Prescriptions     Signed Prescriptions Disp Refills    pantoprazole 40 MG Oral Tab EC 90 tablet 3     Sig: Take 1 tablet (40 mg total) by mouth before breakfast.     Authorizing Provider: MARBELLA CURTIS     Ordering User: BEATA CELIS

## 2024-05-08 NOTE — TELEPHONE ENCOUNTER
[place] called, following up on Pantoprazole refill request  (refill request was not received)    Patient is going out of town tomorrow    Please send today

## 2024-06-13 RX ORDER — ESCITALOPRAM OXALATE 10 MG/1
10 TABLET ORAL DAILY
Qty: 90 TABLET | Refills: 3 | Status: SHIPPED | OUTPATIENT
Start: 2024-06-13

## 2024-06-13 NOTE — TELEPHONE ENCOUNTER
Refill request is for a maintenance medication and has met the criteria specified in the Ambulatory Medication Refill Standing Order for eligibility, visits, laboratory, alerts and was sent to the requested pharmacy.    Requested Prescriptions     Signed Prescriptions Disp Refills    escitalopram 10 MG Oral Tab 90 tablet 3     Sig: Take 1 tablet (10 mg total) by mouth daily.     Authorizing Provider: MARBELLA CURTIS     Ordering User: KAREN HAYWOOD

## 2024-07-10 ENCOUNTER — TELEPHONE (OUTPATIENT)
Facility: CLINIC | Age: 64
End: 2024-07-10

## 2024-07-10 NOTE — TELEPHONE ENCOUNTER
Patient calling to reschedule procedure if possible wants to see what's available first, please call.

## 2024-08-06 ENCOUNTER — HOSPITAL ENCOUNTER (OUTPATIENT)
Facility: HOSPITAL | Age: 64
Setting detail: HOSPITAL OUTPATIENT SURGERY
Discharge: HOME | End: 2024-08-06
Attending: INTERNAL MEDICINE | Admitting: INTERNAL MEDICINE
Payer: MEDICARE

## 2024-08-06 ENCOUNTER — ANESTHESIA (OUTPATIENT)
Dept: ENDOSCOPY | Facility: HOSPITAL | Age: 64
End: 2024-08-06
Payer: MEDICARE

## 2024-08-06 ENCOUNTER — ANESTHESIA EVENT (OUTPATIENT)
Dept: ENDOSCOPY | Facility: HOSPITAL | Age: 64
End: 2024-08-06
Payer: MEDICARE

## 2024-08-06 VITALS
WEIGHT: 130 LBS | TEMPERATURE: 98 F | HEIGHT: 65 IN | SYSTOLIC BLOOD PRESSURE: 134 MMHG | OXYGEN SATURATION: 99 % | HEART RATE: 58 BPM | RESPIRATION RATE: 19 BRPM | DIASTOLIC BLOOD PRESSURE: 61 MMHG | BODY MASS INDEX: 21.66 KG/M2

## 2024-08-06 DIAGNOSIS — Z80.0 FAMILY HISTORY OF MALIGNANT NEOPLASM OF GASTROINTESTINAL TRACT: ICD-10-CM

## 2024-08-06 DIAGNOSIS — Z86.010 PERSONAL HISTORY OF COLONIC POLYPS: ICD-10-CM

## 2024-08-06 PROCEDURE — 0DBK8ZX EXCISION OF ASCENDING COLON, VIA NATURAL OR ARTIFICIAL OPENING ENDOSCOPIC, DIAGNOSTIC: ICD-10-PCS | Performed by: INTERNAL MEDICINE

## 2024-08-06 PROCEDURE — 0DBH8ZX EXCISION OF CECUM, VIA NATURAL OR ARTIFICIAL OPENING ENDOSCOPIC, DIAGNOSTIC: ICD-10-PCS | Performed by: INTERNAL MEDICINE

## 2024-08-06 PROCEDURE — 45385 COLONOSCOPY W/LESION REMOVAL: CPT | Performed by: INTERNAL MEDICINE

## 2024-08-06 RX ORDER — NALOXONE HYDROCHLORIDE 0.4 MG/ML
0.08 INJECTION, SOLUTION INTRAMUSCULAR; INTRAVENOUS; SUBCUTANEOUS ONCE AS NEEDED
Status: DISCONTINUED | OUTPATIENT
Start: 2024-08-06 | End: 2024-08-06

## 2024-08-06 RX ORDER — SODIUM CHLORIDE, SODIUM LACTATE, POTASSIUM CHLORIDE, CALCIUM CHLORIDE 600; 310; 30; 20 MG/100ML; MG/100ML; MG/100ML; MG/100ML
INJECTION, SOLUTION INTRAVENOUS CONTINUOUS
Status: DISCONTINUED | OUTPATIENT
Start: 2024-08-06 | End: 2024-08-06

## 2024-08-06 RX ORDER — LIDOCAINE HYDROCHLORIDE 10 MG/ML
INJECTION, SOLUTION EPIDURAL; INFILTRATION; INTRACAUDAL; PERINEURAL AS NEEDED
Status: DISCONTINUED | OUTPATIENT
Start: 2024-08-06 | End: 2024-08-06 | Stop reason: SURG

## 2024-08-06 RX ADMIN — LIDOCAINE HYDROCHLORIDE 50 MG: 10 INJECTION, SOLUTION EPIDURAL; INFILTRATION; INTRACAUDAL; PERINEURAL at 10:41:00

## 2024-08-06 RX ADMIN — SODIUM CHLORIDE, SODIUM LACTATE, POTASSIUM CHLORIDE, CALCIUM CHLORIDE: 600; 310; 30; 20 INJECTION, SOLUTION INTRAVENOUS at 11:12:00

## 2024-08-06 RX ADMIN — SODIUM CHLORIDE, SODIUM LACTATE, POTASSIUM CHLORIDE, CALCIUM CHLORIDE: 600; 310; 30; 20 INJECTION, SOLUTION INTRAVENOUS at 10:39:00

## 2024-08-06 NOTE — H&P
History & Physical Examination    Patient Name: Nancy Ba  MRN: [id number]  [facility]: [id number]  YOB: 1960    Diagnosis: Personal history of adenomatous colon polyps, family history of colon cancer      Medications Prior to Admission   Medication Sig Dispense Refill Last Dose    escitalopram 10 MG Oral Tab Take 1 tablet (10 mg total) by mouth daily. 90 tablet 3 8/5/2024    pantoprazole 40 MG Oral Tab EC Take 1 tablet (40 mg total) by mouth before breakfast. 90 tablet 3 8/5/2024    lisinopril 10 MG Oral Tab TAKE ONE TABLET BY MOUTH ONE TIME DAILY 90 tablet 3 8/5/2024    cyanocobalamine 1000 MCG Oral Tab Take 1 tablet (1,000 mcg total) by mouth every other day. 1 tablet 0 8/5/2024    Cholecalciferol (VITAMIN D) 2000 units Oral Cap Take 1 capsule (2,000 Units total) by mouth daily.   8/5/2024    Calcium Carbonate-Vitamin D (CALCIUM + D OR) Take 1 tablet by mouth daily.   8/5/2024    Na Sulfate-K Sulfate-Mg Sulf (SUPREP BOWEL PREP KIT) 17.5-3.13-1.6 GM/177ML Oral Solution Take as directed 1 each 0 8/5/2024    HYDROcodone-acetaminophen 5-325 MG Oral Tab Take 1 tablet by mouth. (Patient not taking: Reported on 3/20/2024)       acetaminophen 325 MG Rectal Suppos Place 1 suppository (325 mg total) rectally every 4 (four) hours as needed for Fever. (Patient not taking: Reported on 3/20/2024)        Current Facility-Administered Medications   Medication Dose Route Frequency    lactated ringers infusion   Intravenous Continuous       Allergies: No Known Allergies    Past Medical History:    Adenomatous polyp of sigmoid colon    Anxiety state    Cordero's esophagus    Breast cancer (HCC)    RIGHT BREAST,     Cervical dysplasia    Colon polyps    A. Cecum polyp: Fragments of tubular adenoma.  B. Transverse colon polyp: Multiple fragments of sessile serrated adenoma.    Depression    Herpes zoster    High blood pressure    Hydronephrosis    Lymphedema of arm    Right arm    Malignant neoplasm of overlapping sites of  right breast in female, estrogen receptor positive (HCC)    Multiple polyps of sigmoid colon    Osteopenia determined by x-ray    Osteoporosis screening    DEXA SCAN:  The patient's bone density is within the osteoporotic range. 2014:    Other specified disorders of uterus, not elsewhere classified    endometrial abnormality    Pregnancy (HCC)    x 3 vag vacuum, sp ,     Problems with swallowing    Radicular pain    HERPES ZOSTER / MARCAINE DEPO MEDROL T10-12    Reactive depression    Renal disorder    right side ureter and kidney removal     Urothelial carcinoma (HCC)    right     Past Surgical History:   Procedure Laterality Date    Breast lumpectomy      /EBR/chemo - cytoxan=taxotere    Chemotherapy  ,    Colonoscopy N/A 2016    Procedure: COLONOSCOPY;  Surgeon: Jaime Amezcua MD;  Location: [facility] ENDOSCOPY    Colonoscopy N/A 2018    Procedure: COLONOSCOPY;  Surgeon: Jaime Amezcua MD;  Location: [facility] ENDOSCOPY    Colonoscopy N/A 2021    Procedure: COLONOSCOPY;  Surgeon: Jaime Amezcua MD;  Location: [facility] ENDOSCOPY    D & c      Egd  2019    Egd  2020    Egd  2023    Hysterectomy  2010    Kidney surgery Right     nephrectomy/ureter removed     Lumpectomy right  2008    Mary Carmen biopsy stereo nodule 2 site bilat (cpt=[id number])      Needle biopsy left      Other surgical history      CONE BIOPSY     Other surgical history      D&C due to endometrial abnormality    Other surgical history      HAND SURGERY     Other surgical history      D&C, TUBALLIGATION     Port, indwelling, imp      Radiation right      Total abdominal hysterectomy      BSO     Family History   Problem Relation Age of Onset    Heart Disorder Father     Gastro-Intestinal Disorder Mother         diverticulitis    Diabetes Son     Cancer Son 31        colon ca    Diabetes Son     Diabetes Maternal Grandmother     Cancer Maternal  Grandfather         gastric ca; non-smoker    Breast Cancer Sister 66    Genito-Urinary Disorder Sister         urolithiasis    Diabetes Sister     Cancer Sister 63        lung ca; fmr smoker    Breast Cancer Maternal Cousin Female 50        post menopause    Breast Cancer Maternal Cousin Female 40    Breast Cancer Self 46        2008    Cancer Maternal Cousin Female         lymphoma     Social History     Tobacco Use    Smoking status: Former    Smokeless tobacco: Never    Tobacco comments:     Quit age 25   Substance Use Topics    Alcohol use: Yes     Alcohol/week: 5.0 standard drinks of alcohol     Types: 1 Glasses of wine, 4 Standard drinks or equivalent per week     Comment: SOCIALLY       SYSTEM Check if Review is Normal Check if Physical Exam is Normal If not normal, please explain:   HEENT [X ] [ X]    NECK  [X ] [ X]    HEART [X ] [ X]    LUNGS [X ] [ X]    ABDOMEN [X ] [ X]    EXTREMITIES [X ] [ X]    OTHER        [ x ] I have discussed the risks and benefits and alternatives with the patient/family.  They understand and agree to proceed with plan of care.  [ x ] I have reviewed the History and Physical done within the last 30 days.  Any changes noted above.    Jaime Amezcua MD  8/6/2024  10:43 AM

## 2024-08-06 NOTE — OPERATIVE REPORT
[facility] Endoscopy Report      Date of Procedure:  08/06/24      Preoperative Diagnosis:  1.  Personal history of adenomatous colon polyps  2.  Family history of colon cancer      Postoperative Diagnosis:  1.  Colon polyps  2.  Colonic diverticulosis      Procedure:    Colonoscopy with polypectomy      Surgeon:  Jaime Amezcua M.D.      Anesthesia:  Monitored anesthesia care  Cecal withdrawal time: 20 minutes  EBL:  Insignificant      Brief History:  This is a 64 year old female who presents for a surveillance/screening colonoscopy in the setting of a history of multiple adenomatous colon polyps including a large cecal sessile serrated adenoma and sigmoid tubulovillous adenoma (both in 2016) and a family history of colon cancer in her son at the age of 32 (reportedly negative genetic testing).  The patient also has a history of urothelial cancer.  The patient's last colonoscopy was 3 years prior.  She has been asymptomatic from a lower gastrointestinal tract standpoint.      Technique:  After informed consent, the patient was placed in the left lateral recumbent position.  Digital rectal examination revealed no palpable intraluminal abnormalities.  An Olympus variable stiffness 190 series HD pediatric colonoscope was inserted into the rectum and advanced under direct vision by following the lumen to the terminal ileum.  The colon was examined upon withdrawal in the left lateral recumbent position.      Findings:  The preparation of the colon was very good.  The terminal ileum was examined for 5 cm and visually normal.  The ileocecal valve was well preserved. The visualized colonic mucosa from the cecum to the anal verge was normal with an intact vascular pattern.  There were #2 polyps seen within the colon which were removed as follows:    1.  In the cecum there was a 3 mm sessile polyp which was cold snare excised and retrieved.  2.  In the distal ascending colon there was a 7-8 mm sessile  serrated polyp which was cold snare excised and retrieved.    Inspection of both sites revealed no evidence of ongoing bleeding.  Scattered diverticula were seen throughout the colon without signs of complication.  An occasional area of flat white scar deformity from prior polypectomies was seen throughout the colon.  There were no other colonic polyps, mass lesions, vascular anomalies or signs of inflammation seen.  Retroflexion in the rectum revealed no abnormalities.  The procedure was well tolerated without immediate complication.      Impression:  1.  Colon polyps  2.  Pancolonic diverticulosis    Recommendations:  1.  Standard postprocedural instructions given.  2.  Follow-up biopsy results.  Polyp histology to determine recommendations regarding follow-up (3-5 years)        Jaime Amezcua MD  8/6/2024  11:21 AM           [address] [place], [place] [id number]

## 2024-08-06 NOTE — ANESTHESIA POSTPROCEDURE EVALUATION
Patient: Nancy Ba    Procedure Summary       Date: 08/06/24 Room / Location: [facility] ENDOSCOPY 04 / [facility] ENDOSCOPY    Anesthesia Start: 1039 Anesthesia Stop:     Procedure: COLONOSCOPY Diagnosis:       Personal history of colonic polyps      Family history of malignant neoplasm of gastrointestinal tract      (diverticulosis, polyps,)    Surgeons: Jaime Amezcua MD Anesthesiologist: Ema Farias CRNA    Anesthesia Type: MAC ASA Status: 2            Anesthesia Type: MAC    Vitals Value Taken Time   /53 08/06/24 1116   Temp 98 °F (36.7 °C) 08/06/24 1116   Pulse 64 08/06/24 1116   Resp 16 08/06/24 1116   SpO2 97 % 08/06/24 1116       [facility] AN Post Evaluation:   Patient Evaluated in PACU  Patient Participation: complete - patient participated  Level of Consciousness: sleepy but conscious  Pain Score: 0  Pain Management: adequate  Airway Patency:patent  Dental exam unchanged from preop  Yes    Cardiovascular Status: stable and acceptable  Respiratory Status: acceptable and room air  Postoperative Hydration acceptable      EMA FARIAS CRNA  8/6/2024 11:17 AM

## 2024-08-06 NOTE — DISCHARGE INSTRUCTIONS
Home Care Instructions for Colonoscopy with Sedation    Diet:  - Resume your regular diet as tolerated unless otherwise instructed.  - Start with light meals to minimize bloating.  - Do not drink alcohol today.    Medication:  - If you have questions about resuming your normal medications, please contact your Primary Care Physician.    Activities:  - Take it easy today. Do not return to work today.  - Do not drive today.  - Do not operate any machinery today (including kitchen equipment).    Colonoscopy:  - You may notice some rectal \"spotting\" (a little blood on the toilet tissue) for a day or two after the exam. This is normal.  - If you experience any rectal bleeding (not spotting), persistent tenderness or sharp severe abdominal pains, oral temperature over 100 degrees Fahrenheit, light-headedness or dizziness, or any other problems, contact your doctor.    Gastroscopy:  - You may have a sore throat for 2-3 days following the exam. This is normal. Gargling with warm salt water (1/2 tsp salt to 1 glass warm water) or using throat lozenges will help.  - If you experience any sharp pain in your neck, abdomen or chest, vomiting of blood, oral temperature over 100 degrees Fahrenheit, light-headedness or dizziness, or any other problems, contact your doctor.    **If unable to reach your doctor, please go to the [facility] Emergency Room**    - Your referring physician will receive a full report of your examination.  - If you do not hear from your doctor's office within two weeks of your biopsy, please call them for your results.    You may be able to see your laboratory results in [employer] between 4 and 7 business days.  In some cases, your physician may not have viewed the results before they are released to [employer].  If you have questions regarding your results contact the physician who ordered the test/exam by phone or via [employer] by choosing \"Ask a Medical Question.\"

## 2024-08-06 NOTE — ANESTHESIA PREPROCEDURE EVALUATION
Anesthesia PreOp Note    HPI:     Nancy Ba is a 64 year old female who presents for preoperative consultation requested by: Jaime Amezcua MD    Date of Surgery: 8/6/2024    Procedure(s):  COLONOSCOPY  Indication: Personal history of colonic polyps / Family history of malignant neoplasm of gastrointestinal tract    Relevant Problems   No relevant active problems       NPO:  Last Liquid Consumption Date: 08/06/24  Last Liquid Consumption Time: 0730  Last Solid Consumption Date: 08/04/24  Last Solid Consumption Time: 2000  Last Liquid Consumption Date: 08/06/24          History Review:  Patient Active Problem List    Diagnosis Date Noted    Personal history of malignant neoplasm of breast 12/11/2023    Family history of malignant neoplasm of breast 12/11/2023    Family history of malignant neoplasm of gastrointestinal tract 12/11/2023    Viral infection 10/16/2023    Malignant neoplasm of breast (HCC) 10/16/2023    Acute pain of left wrist 10/16/2023    Closed Johnson's fracture of left radius 10/16/2023    Osteopenia of multiple sites 12/13/2021    Vitamin B12 deficiency 02/12/2021    Current use of proton pump inhibitor 02/09/2021    Encounter for screening mammogram for malignant neoplasm of breast 11/30/2020    Cordero's esophagus 07/01/2019    MIKE exposure in utero 05/07/2019    Lymphedema of right arm 03/18/2019    Dense breasts 09/19/2016    Multiple polyps of sigmoid colon 09/19/2016    Routine health maintenance 09/03/2015    Transitional cell carcinoma (HCC) 12/18/2014    Reactive depression 06/16/2014    Osteopenia determined by x-ray 06/16/2014    Urothelial carcinoma (HCC)     Osteoporosis 01/18/2014    Malignant neoplasm of ureter (HCC) 12/20/2013    Gross hematuria 12/12/2013    Herpes zoster 11/06/2012    Lymphedema of arm 01/06/2012    Malignant neoplasm of overlapping sites of right breast in female, estrogen receptor positive (HCC) 12/14/2007       Past Medical History:    Adenomatous polyp  of sigmoid colon    Anxiety state    Cordero's esophagus    Breast cancer (HCC)    RIGHT BREAST,     Cervical dysplasia    Colon polyps    A. Cecum polyp: Fragments of tubular adenoma.  B. Transverse colon polyp: Multiple fragments of sessile serrated adenoma.    Depression    Herpes zoster    High blood pressure    Hydronephrosis    Lymphedema of arm    Right arm    Malignant neoplasm of overlapping sites of right breast in female, estrogen receptor positive (HCC)    Multiple polyps of sigmoid colon    Osteopenia determined by x-ray    Osteoporosis screening    DEXA SCAN:  The patient's bone density is within the osteoporotic range. 2014:    Other specified disorders of uterus, not elsewhere classified    endometrial abnormality    Pregnancy (HCC)    x 3 vag vacuum, sp ,     Problems with swallowing    Radicular pain    HERPES ZOSTER / MARCAINE DEPO MEDROL T10-12    Reactive depression    Renal disorder    right side ureter and kidney removal     Urothelial carcinoma (HCC)    right       Past Surgical History:   Procedure Laterality Date    Breast lumpectomy      /EBR/chemo - cytoxan=taxotere    Chemotherapy  ,    Colonoscopy N/A 2016    Procedure: COLONOSCOPY;  Surgeon: Jaime Amezcua MD;  Location: [facility] ENDOSCOPY    Colonoscopy N/A 2018    Procedure: COLONOSCOPY;  Surgeon: Jaime Amezcua MD;  Location: [facility] ENDOSCOPY    Colonoscopy N/A 2021    Procedure: COLONOSCOPY;  Surgeon: Jaime Amezcua MD;  Location: [facility] ENDOSCOPY    D & c      Egd  2019    Egd  2020    Egd  2023    Hysterectomy  2010    Kidney surgery Right     nephrectomy/ureter removed     Lumpectomy right  2008    Mary Carmen biopsy stereo nodule 2 site bilat (cpt=19081/40863)      Needle biopsy left      Other surgical history      CONE BIOPSY     Other surgical history      D&C due to endometrial abnormality    Other surgical history      HAND SURGERY      Other surgical history  2004    D&C, TUBALLIGATION     Port, indwelling, imp      Radiation right  2008    Total abdominal hysterectomy  2010    BSO       Medications Prior to Admission   Medication Sig Dispense Refill Last Dose    escitalopram 10 MG Oral Tab Take 1 tablet (10 mg total) by mouth daily. 90 tablet 3 8/5/2024    pantoprazole 40 MG Oral Tab EC Take 1 tablet (40 mg total) by mouth before breakfast. 90 tablet 3 8/5/2024    lisinopril 10 MG Oral Tab TAKE ONE TABLET BY MOUTH ONE TIME DAILY 90 tablet 3 8/5/2024    cyanocobalamine 1000 MCG Oral Tab Take 1 tablet (1,000 mcg total) by mouth every other day. 1 tablet 0 8/5/2024    Cholecalciferol (VITAMIN D) 2000 units Oral Cap Take 1 capsule (2,000 Units total) by mouth daily.   8/5/2024    Calcium Carbonate-Vitamin D (CALCIUM + D OR) Take 1 tablet by mouth daily.   8/5/2024    Na Sulfate-K Sulfate-Mg Sulf (SUPREP BOWEL PREP KIT) 17.5-3.13-1.6 GM/177ML Oral Solution Take as directed 1 each 0 8/5/2024    HYDROcodone-acetaminophen 5-325 MG Oral Tab Take 1 tablet by mouth. (Patient not taking: Reported on 3/20/2024)       acetaminophen 325 MG Rectal Suppos Place 1 suppository (325 mg total) rectally every 4 (four) hours as needed for Fever. (Patient not taking: Reported on 3/20/2024)        Current Facility-Administered Medications Ordered in Epic   Medication Dose Route Frequency Provider Last Rate Last Admin    lactated ringers infusion   Intravenous Continuous Jaime Amezcua MD         No current [facility]-ordered outpatient medications on file.       No Known Allergies    Family History   Problem Relation Age of Onset    Heart Disorder Father     Gastro-Intestinal Disorder Mother         diverticulitis    Diabetes Son     Cancer Son 31        colon ca    Diabetes Son     Diabetes Maternal Grandmother     Cancer Maternal Grandfather         gastric ca; non-smoker    Breast Cancer Sister 66    Genito-Urinary Disorder Sister         urolithiasis    Diabetes  Sister     Cancer Sister 63        lung ca; fmr smoker    Breast Cancer Maternal Cousin Female 50        post menopause    Breast Cancer Maternal Cousin Female 40    Breast Cancer Self 46        2008    Cancer Maternal Cousin Female         lymphoma     Social History     Socioeconomic History    Marital status:    Tobacco Use    Smoking status: Former    Smokeless tobacco: Never    Tobacco comments:     Quit age 25   Vaping Use    Vaping status: Never Used   Substance and Sexual Activity    Alcohol use: Yes     Alcohol/week: 5.0 standard drinks of alcohol     Types: 1 Glasses of wine, 4 Standard drinks or equivalent per week     Comment: SOCIALLY    Drug use: No    Sexual activity: Yes     Birth control/protection: Hysterectomy   Other Topics Concern    Caffeine Concern Yes     Comment: COFFEE 1 CUP/DAILY       Available pre-op labs reviewed.             Vital Signs:  Body mass index is 21.63 kg/m².   height is 1.651 m (5' 5\") and weight is 59 kg (130 lb). Her blood pressure is 181/72 (abnormal). Her oxygen saturation is 100%.   Vitals:    07/30/24 1514 08/06/24 1003 08/06/24 1005   BP:   (!) 181/72   SpO2:  100%    Weight: 59 kg (130 lb)     Height: 1.651 m (5' 5\")          Anesthesia Evaluation     Patient summary reviewed and Nursing notes reviewed    Airway   Mallampati: II  TM distance: >3 FB  Neck ROM: full  Dental - Dentition appears grossly intact     Pulmonary - negative ROS and normal exam    breath sounds clear to auscultation  Cardiovascular - normal exam  (+) hypertension well controlled    Neuro/Psych    (+)  anxiety/panic attacks,  depression      GI/Hepatic/Renal - negative ROS     Endo/Other - negative ROS   Abdominal  - normal exam                 Anesthesia Plan:   ASA:  2  Plan:   MAC  Informed Consent Plan and Risks Discussed With:  Patient  Discussed plan with:  Surgeon      I have informed Nancy Ba and/or legal guardian or family member of the nature of the anesthetic plan,  benefits, risks including possible dental damage if relevant, major complications, and any alternative forms of anesthetic management.   All of the patient's questions were answered to the best of my ability. The patient desires the anesthetic management as planned.  KATIE FARIAS CRNA  8/6/2024 10:07 AM  Present on Admission:  **None**

## 2024-08-09 ENCOUNTER — TELEPHONE (OUTPATIENT)
Facility: CLINIC | Age: 64
End: 2024-08-09

## 2024-08-09 NOTE — TELEPHONE ENCOUNTER
Colonoscopy completed by Dr. Bhardwaj on 08/6/24, health maintenance update and entered colon recall for 4 years. Patient outreach updated.

## 2024-08-09 NOTE — TELEPHONE ENCOUNTER
----- Message from Jaime Amezcua sent at 8/8/2024  4:52 PM CDT -----  Please see the following [employer] message sent to the patient:    \"Hi Nancy,    I left a message on your voicemail.  Your colonoscopy revealed #2 small polyps which were removed.  #1 of the polyps was a serrated adenoma.  This is the type of polyp that has a potential to become a tumor or cancer, however, at this stage was small, benign and completely removed.  Most polyps of this size and type do not progress to cancer.  You have had similar polyps removed in the past.  The other polyp was hyperplastic which is not precancerous and of no consequence.  Uncomplicated diverticulosis was present as well.    I would recommend a high-fiber diet for the diverticulosis.    I would recommend a follow-up colonoscopy in 3-5 years.  You could proceed at the time of your next upper endoscopy in August 2028 (4 years).    If I can answer any questions regarding the above, please do not hesitate to contact me.    Sincerely,    Dr. Amezcua\"    GI RNs: Please enter colonoscopy recall for August 2028.  To be done by Dr. Valladares.

## 2024-12-27 ENCOUNTER — TELEPHONE (OUTPATIENT)
Dept: INTERNAL MEDICINE CLINIC | Facility: CLINIC | Age: 64
End: 2024-12-27

## 2024-12-27 DIAGNOSIS — E53.8 VITAMIN B12 DEFICIENCY: ICD-10-CM

## 2024-12-27 DIAGNOSIS — Z00.00 MEDICARE ANNUAL WELLNESS VISIT, SUBSEQUENT: Primary | ICD-10-CM

## 2024-12-27 DIAGNOSIS — M81.0 AGE-RELATED OSTEOPOROSIS WITHOUT CURRENT PATHOLOGICAL FRACTURE: ICD-10-CM

## 2024-12-27 NOTE — TELEPHONE ENCOUNTER
Patient calling to request blood work prior to:    [x] physical    [] check-up      [] other    Patient uses:  [x] [facility] [] [facility]       [facility] location:       Fax #:    Patient prefers to be notified once labs are placed by: [x] phone call  [] my chart message    Physical scheduled with Dr. Huitron for 2/26/2025

## 2025-01-01 PROBLEM — S52.542A CLOSED SMITH'S FRACTURE OF LEFT RADIUS: Status: RESOLVED | Noted: 2023-10-16 | Resolved: 2025-01-01

## 2025-01-01 PROBLEM — M25.532 ACUTE PAIN OF LEFT WRIST: Status: RESOLVED | Noted: 2023-10-16 | Resolved: 2025-01-01

## 2025-01-01 PROBLEM — B34.9 VIRAL INFECTION: Status: RESOLVED | Noted: 2023-10-16 | Resolved: 2025-01-01

## 2025-02-18 ENCOUNTER — LAB ENCOUNTER (OUTPATIENT)
Dept: LAB | Age: 65
End: 2025-02-18
Attending: INTERNAL MEDICINE
Payer: MEDICARE

## 2025-02-18 DIAGNOSIS — M81.0 AGE-RELATED OSTEOPOROSIS WITHOUT CURRENT PATHOLOGICAL FRACTURE: ICD-10-CM

## 2025-02-18 DIAGNOSIS — E53.8 VITAMIN B12 DEFICIENCY: ICD-10-CM

## 2025-02-18 DIAGNOSIS — Z00.00 MEDICARE ANNUAL WELLNESS VISIT, SUBSEQUENT: ICD-10-CM

## 2025-02-18 LAB
ALBUMIN SERPL-MCNC: 4.6 G/DL (ref 3.2–4.8)
ALBUMIN/GLOB SERPL: 2.2 {RATIO} (ref 1–2)
ALP LIVER SERPL-CCNC: 46 U/L
ALT SERPL-CCNC: 11 U/L
ANION GAP SERPL CALC-SCNC: 8 MMOL/L (ref 0–18)
AST SERPL-CCNC: 18 U/L (ref ?–34)
BASOPHILS # BLD AUTO: 0.07 X10(3) UL (ref 0–0.2)
BASOPHILS NFR BLD AUTO: 1.8 %
BILIRUB SERPL-MCNC: 0.7 MG/DL (ref 0.2–1.1)
BUN BLD-MCNC: 22 MG/DL (ref 9–23)
CALCIUM BLD-MCNC: 9.6 MG/DL (ref 8.7–10.6)
CHLORIDE SERPL-SCNC: 103 MMOL/L (ref 98–112)
CHOLEST SERPL-MCNC: 201 MG/DL (ref ?–200)
CO2 SERPL-SCNC: 29 MMOL/L (ref 21–32)
CREAT BLD-MCNC: 1.13 MG/DL
EGFRCR SERPLBLD CKD-EPI 2021: 54 ML/MIN/1.73M2 (ref 60–?)
EOSINOPHIL # BLD AUTO: 0.18 X10(3) UL (ref 0–0.7)
EOSINOPHIL NFR BLD AUTO: 4.7 %
ERYTHROCYTE [DISTWIDTH] IN BLOOD BY AUTOMATED COUNT: 13.5 %
FASTING PATIENT LIPID ANSWER: YES
FASTING STATUS PATIENT QL REPORTED: YES
GLOBULIN PLAS-MCNC: 2.1 G/DL (ref 2–3.5)
GLUCOSE BLD-MCNC: 86 MG/DL (ref 70–99)
HCT VFR BLD AUTO: 38.8 %
HDLC SERPL-MCNC: 70 MG/DL (ref 40–59)
HGB BLD-MCNC: 12.2 G/DL
IMM GRANULOCYTES # BLD AUTO: 0.01 X10(3) UL (ref 0–1)
IMM GRANULOCYTES NFR BLD: 0.3 %
LDLC SERPL CALC-MCNC: 118 MG/DL (ref ?–100)
LYMPHOCYTES # BLD AUTO: 1.29 X10(3) UL (ref 1–4)
LYMPHOCYTES NFR BLD AUTO: 33.4 %
MCH RBC QN AUTO: 29.9 PG (ref 26–34)
MCHC RBC AUTO-ENTMCNC: 31.4 G/DL (ref 31–37)
MCV RBC AUTO: 95.1 FL
MONOCYTES # BLD AUTO: 0.36 X10(3) UL (ref 0.1–1)
MONOCYTES NFR BLD AUTO: 9.3 %
NEUTROPHILS # BLD AUTO: 1.95 X10 (3) UL (ref 1.5–7.7)
NEUTROPHILS # BLD AUTO: 1.95 X10(3) UL (ref 1.5–7.7)
NEUTROPHILS NFR BLD AUTO: 50.5 %
NONHDLC SERPL-MCNC: 131 MG/DL (ref ?–130)
OSMOLALITY SERPL CALC.SUM OF ELEC: 293 MOSM/KG (ref 275–295)
PLATELET # BLD AUTO: 272 10(3)UL (ref 150–450)
POTASSIUM SERPL-SCNC: 4.6 MMOL/L (ref 3.5–5.1)
PROT SERPL-MCNC: 6.7 G/DL (ref 5.7–8.2)
RBC # BLD AUTO: 4.08 X10(6)UL
SODIUM SERPL-SCNC: 140 MMOL/L (ref 136–145)
TRIGL SERPL-MCNC: 70 MG/DL (ref 30–149)
TSI SER-ACNC: 1.52 UIU/ML (ref 0.55–4.78)
VIT B12 SERPL-MCNC: 811 PG/ML (ref 211–911)
VIT D+METAB SERPL-MCNC: 67.7 NG/ML (ref 30–100)
VLDLC SERPL CALC-MCNC: 12 MG/DL (ref 0–30)
WBC # BLD AUTO: 3.9 X10(3) UL (ref 4–11)

## 2025-02-18 PROCEDURE — 80053 COMPREHEN METABOLIC PANEL: CPT

## 2025-02-18 PROCEDURE — 36415 COLL VENOUS BLD VENIPUNCTURE: CPT

## 2025-02-18 PROCEDURE — 82607 VITAMIN B-12: CPT

## 2025-02-18 PROCEDURE — 82306 VITAMIN D 25 HYDROXY: CPT

## 2025-02-18 PROCEDURE — 85025 COMPLETE CBC W/AUTO DIFF WBC: CPT

## 2025-02-18 PROCEDURE — 80061 LIPID PANEL: CPT

## 2025-02-18 PROCEDURE — 84443 ASSAY THYROID STIM HORMONE: CPT

## 2025-02-26 ENCOUNTER — OFFICE VISIT (OUTPATIENT)
Dept: INTERNAL MEDICINE CLINIC | Facility: CLINIC | Age: 65
End: 2025-02-26

## 2025-02-26 VITALS
DIASTOLIC BLOOD PRESSURE: 78 MMHG | BODY MASS INDEX: 22.33 KG/M2 | OXYGEN SATURATION: 99 % | HEIGHT: 65 IN | TEMPERATURE: 98 F | SYSTOLIC BLOOD PRESSURE: 122 MMHG | WEIGHT: 134 LBS | HEART RATE: 75 BPM

## 2025-02-26 DIAGNOSIS — Z79.899 CURRENT USE OF PROTON PUMP INHIBITOR: ICD-10-CM

## 2025-02-26 DIAGNOSIS — N18.31 STAGE 3A CHRONIC KIDNEY DISEASE (HCC): ICD-10-CM

## 2025-02-26 DIAGNOSIS — K63.5 MULTIPLE POLYPS OF SIGMOID COLON: ICD-10-CM

## 2025-02-26 DIAGNOSIS — C50.811 MALIGNANT NEOPLASM OF OVERLAPPING SITES OF RIGHT BREAST IN FEMALE, ESTROGEN RECEPTOR POSITIVE (HCC): ICD-10-CM

## 2025-02-26 DIAGNOSIS — Z17.0 MALIGNANT NEOPLASM OF OVERLAPPING SITES OF RIGHT BREAST IN FEMALE, ESTROGEN RECEPTOR POSITIVE (HCC): ICD-10-CM

## 2025-02-26 DIAGNOSIS — Z78.0 POSTMENOPAUSE: Primary | ICD-10-CM

## 2025-02-26 DIAGNOSIS — Z00.00 ROUTINE HEALTH MAINTENANCE: ICD-10-CM

## 2025-02-26 DIAGNOSIS — E53.8 VITAMIN B12 DEFICIENCY: ICD-10-CM

## 2025-02-26 DIAGNOSIS — Z12.31 ENCOUNTER FOR SCREENING MAMMOGRAM FOR MALIGNANT NEOPLASM OF BREAST: ICD-10-CM

## 2025-02-26 DIAGNOSIS — M85.89 OSTEOPENIA OF MULTIPLE SITES: ICD-10-CM

## 2025-02-26 DIAGNOSIS — K22.70 BARRETT'S ESOPHAGUS WITHOUT DYSPLASIA: ICD-10-CM

## 2025-02-26 DIAGNOSIS — C68.9 UROTHELIAL CARCINOMA (HCC): ICD-10-CM

## 2025-02-26 DIAGNOSIS — I89.0 LYMPHEDEMA OF RIGHT ARM: ICD-10-CM

## 2025-02-26 PROBLEM — C50.919 MALIGNANT NEOPLASM OF BREAST (HCC): Status: RESOLVED | Noted: 2023-10-16 | Resolved: 2025-02-26

## 2025-02-26 NOTE — PROGRESS NOTES
Nancy Ba is a 65 year old female.  Chief Complaint   Patient presents with    Physical       HPI:   Nancy Ba is a 65 year old female who presents for a Welcome to Medicare Exam and physical     Her youngest son was diagnosed with colon cancer, stage 2 in 2022 at age 32; needed surgery only.  Doing fine.    Exercises regularly -- walks, rides stationary, does weights. Always active.    Has first grandchild (son's girlfriend in [place] had a cryptic pregnancy) - granddaughter (Kati); doing great    Sister with stage 1 breast cancer  Other sister with stage 1 lung cancer (remote smoker); discovered incidentally.    SocHx:  Working as a  at [employer].   retired from his  job, so pt thinking about cutting her hours so they can travel more.        Wt Readings from Last 6 Encounters:   02/26/25 134 lb (60.8 kg)   07/30/24 130 lb (59 kg)   03/12/24 135 lb 2 oz (61.3 kg)   10/16/23 132 lb (59.9 kg)   06/06/23 132 lb (59.9 kg)   05/09/23 133 lb (60.3 kg)     Body mass index is 22.3 kg/m².       Current Outpatient Medications   Medication Sig Dispense Refill    escitalopram 10 MG Oral Tab Take 1 tablet (10 mg total) by mouth daily. 90 tablet 3    pantoprazole 40 MG Oral Tab EC Take 1 tablet (40 mg total) by mouth before breakfast. 90 tablet 3    lisinopril 10 MG Oral Tab TAKE ONE TABLET BY MOUTH ONE TIME DAILY 90 tablet 3    cyanocobalamine 1000 MCG Oral Tab Take 0.5 tablets (500 mcg total) by mouth daily. 1 tablet 0    Cholecalciferol (VITAMIN D) 2000 units Oral Cap Take 1 capsule (2,000 Units total) by mouth daily.      Calcium Carbonate-Vitamin D (CALCIUM + D OR) Take 1 tablet by mouth daily.        Past Medical History:    Adenomatous polyp of sigmoid colon    Anxiety state    Cordero's esophagus    Breast cancer (HCC)    RIGHT BREAST,     Cervical dysplasia    Colon polyps    A. Cecum polyp: Fragments of tubular adenoma.  B. Transverse colon polyp: Multiple fragments of  sessile serrated adenoma.    Depression    Herpes zoster    High blood pressure    Hydronephrosis    Lymphedema of arm    Right arm    Malignant neoplasm of overlapping sites of right breast in female, estrogen receptor positive (HCC)    Multiple polyps of sigmoid colon    Osteopenia determined by x-ray    Osteoporosis screening    DEXA SCAN:  The patient's bone density is within the osteoporotic range. 2014:    Other specified disorders of uterus, not elsewhere classified    endometrial abnormality    Pregnancy (HCC)    x 3 vag vacuum, sp ,     Problems with swallowing    Radicular pain    HERPES ZOSTER / MARCAINE DEPO MEDROL T10-12    Reactive depression    Renal disorder    right side ureter and kidney removal     Urothelial carcinoma (HCC)    right      Past Surgical History:   Procedure Laterality Date    Breast lumpectomy      /EBR/chemo - cytoxan=taxotere    Chemotherapy  ,    Colonoscopy N/A 2016    Procedure: COLONOSCOPY;  Surgeon: Jaime Amezcua MD;  Location: [facility] ENDOSCOPY    Colonoscopy N/A 2018    Procedure: COLONOSCOPY;  Surgeon: Jiame Amezcua MD;  Location: [facility] ENDOSCOPY    Colonoscopy N/A 2021    Procedure: COLONOSCOPY;  Surgeon: Jaime Amezcua MD;  Location: [facility] ENDOSCOPY    Colonoscopy N/A 2024    Procedure: COLONOSCOPY;  Surgeon: Jaime Amezcua MD;  Location: [facility] ENDOSCOPY    D & c      Egd  2019    Egd  2020    Egd  2023    Hysterectomy  2010    Kidney surgery Right     nephrectomy/ureter removed     Lumpectomy right  2008    Mary Carmen biopsy stereo nodule 2 site bilat (cpt=19081/06834)      Needle biopsy left      Other surgical history      CONE BIOPSY     Other surgical history      D&C due to endometrial abnormality    Other surgical history      HAND SURGERY     Other surgical history      D&C, TUBALLIGATION     Port, indwelling, imp      Radiation right      Total  abdominal hysterectomy  2010    BSO      Family History   Problem Relation Age of Onset    Heart Disorder Father     Gastro-Intestinal Disorder Mother         diverticulitis    Diabetes Son     Cancer Son 31        colon ca    Diabetes Son     Diabetes Maternal Grandmother     Cancer Maternal Grandfather         gastric ca; non-smoker    Breast Cancer Sister 66    Genito-Urinary Disorder Sister         urolithiasis    Diabetes Sister     Cancer Sister 63        lung ca; fmr smoker    Breast Cancer Maternal Cousin Female 50        post menopause    Breast Cancer Maternal Cousin Female 40    Breast Cancer Self 47        2008    Cancer Maternal Cousin Female         lymphoma      Social History:   Social History     Socioeconomic History    Marital status:    Tobacco Use    Smoking status: Former     Passive exposure: Never    Smokeless tobacco: Never    Tobacco comments:     Quit age 25   Vaping Use    Vaping status: Never Used   Substance and Sexual Activity    Alcohol use: Yes     Alcohol/week: 5.0 standard drinks of alcohol     Types: 5 Glasses of wine per week    Drug use: No    Sexual activity: Yes     Birth control/protection: Hysterectomy   Other Topics Concern    Caffeine Concern Yes     Comment: COFFEE 1 CUP/DAILY          Previously smoked less than 1/2 ppd (socially) until age 25        General Health     In the past six months, have you lost more than 10 pounds without trying?: 2 - No    Has your appetite been poor?: No    Type of Diet: Balanced    How does the patient maintain a good energy level?: Appropriate Exercise;Daily Walks;Other    How would you describe your daily physical activity?: Heavy    How would you describe your current health state?: Good    How do you maintain positive mental well-being?: Social Interaction;Visiting Friends;Visiting Family         Have you had any immunizations at another office such as Influenza, Hepatitis B, Tetanus, or Pneumococcal?: No     Functional Ability      Bathing or Showering: Able without help    Toileting: Able without help    Dressing: Able without help    Eating: Able without help    Driving: Able without help    Preparing your meals: Able without help    Managing money/bills: Able without help    Taking medications as prescribed: Able without help    Are you able to afford your medications?: Yes    Hearing Problems?: No     Functional Status     Hearing Problems?: No    Vision Problems? : No    Difficulty walking?: No    Difficulty dressing or bathing?: No    Problems with daily activities? : No    Memory Problems?: No      Fall/Risk Assessment                                                              Depression Screening (PHQ-2/PHQ-9): Over the LAST 2 WEEKS                      Advance Directives     Do you have a healthcare power of ?: Yes    Do you have a living will?: No     Hearing Assessment (Required for AWV/SWV)      Hearing Screening    Time taken: 2/26/2025 10:33 AM  Screening Method: Whisper Test  Whisper Test Result: Pass         Visual Acuity                   Cognitive Assessment     What day of the week is this?: Correct    What month is it?: Correct    What year is it?: Correct    Recall \"Ball\": Correct    Recall \"Flag\": Correct    Recall \"Tree\": Correct        Nancy Ba's SCREENING SCHEDULE   Tests on this list are recommended by your physician but may not be covered, or covered at this frequency, by your insurer. Please check with your insurance carrier before scheduling to verify coverage.   PREVENTATIVE SERVICES  INDICATIONS AND SCHEDULE Internal Lab or Procedure External Lab or Procedure   Diabetes Screening      HbgA1C   Annually No results found for: \"A1C\"      No data to display                Fasting Blood Sugar (FSB)Annually Glucose (mg/dL)   Date Value   02/18/2025 86         No data to display               Cardiovascular Disease Screening     LDL Annually LDL Cholesterol (mg/dL)   Date Value   02/18/2025 118 (H)         EKG One Time y    Colorectal Cancer Screening      Colonoscopy Screen every 10 years Health Maintenance   Topic Date Due    Colorectal Cancer Screening  08/06/2028    Update Health Maintenance if applicable    Flex Sigmoidoscopy Screen every 5 years No results found for this or any previous visit.      No data to display                 Fecal Occult Blood Annually No results found for: \"FOB\", \"OCCULTSTOOL\"      No data to display                Glaucoma Screening      Ophthalmology Visit Annually y    Bone Density Screening      Dexascan Every two years Last Dexa Scan:    XR DEXA BONE DENSITOMETRY (CPT=77080) 07/13/2022        No data to display               Pap and Pelvic      Pap: Every 3 yrs age 21-65 or Pap+HPV every 5 yrs age 30-65, age 65 and older at high risk   No recommendations at this time Update Health Maintenance if applicable    Chlamydia  Annually if high risk No results found for: \"CHLAMYDIA\"      No data to display                Screening Mammogram      Mammogram  Annually Health Maintenance   Topic Date Due    Mammogram  05/03/2025    Update Health Maintenance if applicable   Immunizations      Influenza No orders found for this or any previous visit. Update Immunization Activity if applicable    Pneumococcal No orders found for this or any previous visit. Update Immunization Activity if applicable    Hepatitis B No orders found for this or any previous visit. Update Immunization Activity if applicable    Tetanus Orders placed or performed in visit on 02/28/23    TETANUS, DIPHTHERIA TOXOIDS AND ACELLULAR PERTUSIS VACCINE (TDAP), >7 YEARS, IM USE    Update Immunization Activity if applicable    Zoster (Not covered by Medicare Part B) No orders found for this or any previous visit. Update Immunization Activity if applicable     SPECIFIC DISEASE MONITORING Internal Lab or Procedure External Lab or Procedure   Annual Monitoring of Persistent     Medications (ACE/ARB, digoxin, diuretics)     Potassium  Annually Potassium (mmol/L)   Date Value   02/18/2025 4.6     POTASSIUM (P) (mmol/L)   Date Value   09/19/2016 4.2         No data to display                Creatinine  Annually Creatinine (mg/dL)   Date Value   02/18/2025 1.13 (H)         No data to display                Digoxin Serum Conc  Annually No results found for: \"DIGOXIN\"      No data to display                Diabetes      HgbA1C  Annually No results found for: \"A1C\"      No data to display                Creat/alb ratio  Annually      LDL  Annually LDL Cholesterol (mg/dL)   Date Value   02/18/2025 118 (H)         No data to display                 Dilated Eye exam  Annually      No data to display                   No data to display                COPD      Spirometry Testing Annually No results found for this or any previous visit.      No data to display                        REVIEW OF SYSTEMS:   GENERAL: feels well otherwise  SKIN: denies any unusual skin lesions  EYES:denies blurred vision or double vision  HEENT: denies nasal congestion, sinus pain or ST  LUNGS: denies shortness of breath with exertion or cough  CARDIOVASCULAR: denies chest pain, pressure, or palpitations  GI: denies abdominal pain, nausea, vomiting, diarrhea, constipation, hematochezia, or melena  NEURO: denies headaches or dizziness    EXAM:   /78   Pulse 75   Temp 98.3 °F (36.8 °C)   Ht 5' 5\" (1.651 m)   Wt 134 lb (60.8 kg)   SpO2 99%   BMI 22.30 kg/m²     GENERAL: well developed, well nourished, in no apparent distress  HEENT: normal oropharynx, normal TM's  EYES: PERRLA, EOMI, conjunctivae are pink  NECK: supple, no cervical or supraclavicular LAD, no carotid bruits  BREAST: no dominant or suspicious mass, no axillary LAD  LUNGS: clear to auscultation  CARDIO: RRR, normal S1S2, no gallops or murmurs  GI: soft, NT, ND, NABS, no HSM  EXTREMITIES: no cyanosis, clubbing or edema, +2 DP pulses        ASSESSMENT AND PLAN:     Routine health  maintenance  -Mammo per Dr. oHpkins    -Historically had Paps per Dr. Boswell.  Pap done here 3/2023 - neg (pt is -s/p AARON/BSO); no further screening indicated    -Had both Shingrix vaccine.    -Tdap  2/28/23  -PCV 20 3/12/24    Hx of adenomatous colon polyps  -Colonoscopy 2/20/18 -- 3 small polyps (Dr. Amezcua)  -colonoscopy 5/2/21  3 small polyps  (Dr. Amezcua) -- repeat in 3 years (2024).  -Colonscopy 8/6/24 -- 3 subcm polyps (2 adenomatous, 1 not retrieved)  -Dr. Martin recommended repeat in 3 years (8/2027)    Essential hypertension  -on lisinopril 10mg/day   -BP well controlled    CKD 2/3a  Present since 4/2014  Stable  Avoid NSAIDs    Malignant neoplasm of overlapping sites of right breast in female, estrogen receptor positive (HCC)  -dx'ed 12/14/07; Stage IIA (T1b, N1, Mx).    -s/p R lumpectomy, sentinel LN bx 1/4/08 at [facility] (Dr. Heather Almazan); (invasive ductal carcinoma)  -s/p chemo 2/2008 and then RT  -BRCA negative  -s/p tamoxifen x 2 years - then AARON/BSO - changed to arimidex in 2010; stopped in 6/2023  -has lymphedema of R hand; wears a glove  -port removed in 2023  -saw onc Dr. Hopkins -- pt opting not to see anymore given remoteness of dx and being off arimidex  -genetic testing in 12/2023 negative  -mammo ordered; due 5/2025    Urothelial carcinoma (HCC)  dx'ed 12/2013 (presented with gross hematuria); s/p chemo 1/2014-2/2014  -s/p R nephroureterectomy and pelvic node dissection at [facility] (Dr. Noé Johnson).    -Followed annually by new urologist at [facility] for cysto, CXR, CT abd/pelvis.    -Cysto 2021 was neg.  -saw Dr. Nick Dunaway 5/23/24  -- cysto neg  -to have CT this year  -was told repeat cysto in 2 years (2026)    Osteopenia, multiple sites   Did not tolerate Fosamax due to severe reflux (had subsequent EGD 7/2019).    Was receiving Zometa q 6 months per Dr. Heredia (last in 2/2022); stopped by Dr. Hopkins.     Repeat DEXA 6/2020 and again 7/2022 both showed improvement in bone  density.   Check repeat DEXA    Cordero's esophagus  Had EGD 7/1/19 -- short segment Cordero's esophagus. On protonix.   Last EGD 8/8/23 (Dr. Valladares) -- next in 5 years    Mild depression  Stable on lexapro 10mg/day    Vitamin B12 deficiency  On Vit B12 1000 mcg every other day  Level normal 811    RTC 1 yr

## 2025-04-08 RX ORDER — LISINOPRIL 10 MG/1
10 TABLET ORAL DAILY
Qty: 90 TABLET | Refills: 3 | Status: SHIPPED | OUTPATIENT
Start: 2025-04-08

## 2025-04-08 NOTE — TELEPHONE ENCOUNTER
Refill request is for a maintenance medication and has met the criteria specified in the Ambulatory Medication Refill Standing Order for eligibility, visits, laboratory, alerts and was sent to the requested pharmacy.    Requested Prescriptions     Signed Prescriptions Disp Refills    lisinopril 10 MG Oral Tab 90 tablet 3     Sig: TAKE ONE TABLET BY MOUTH ONE TIME DAILY     Authorizing Provider: MARBELLA CURTIS     Ordering User: NEAL ESPINOZA

## 2025-04-24 RX ORDER — PANTOPRAZOLE SODIUM 40 MG/1
40 TABLET, DELAYED RELEASE ORAL
Qty: 90 TABLET | Refills: 3 | Status: SHIPPED | OUTPATIENT
Start: 2025-04-24

## 2025-05-05 ENCOUNTER — HOSPITAL ENCOUNTER (OUTPATIENT)
Dept: MAMMOGRAPHY | Facility: HOSPITAL | Age: 65
Discharge: HOME | End: 2025-05-05
Attending: INTERNAL MEDICINE
Payer: MEDICARE

## 2025-05-05 DIAGNOSIS — Z12.31 ENCOUNTER FOR SCREENING MAMMOGRAM FOR MALIGNANT NEOPLASM OF BREAST: ICD-10-CM

## 2025-05-05 PROCEDURE — 77067 SCR MAMMO BI INCL CAD: CPT | Performed by: INTERNAL MEDICINE

## 2025-05-05 PROCEDURE — 77063 BREAST TOMOSYNTHESIS BI: CPT | Performed by: INTERNAL MEDICINE

## 2025-05-06 ENCOUNTER — HOSPITAL ENCOUNTER (OUTPATIENT)
Dept: BONE DENSITY | Age: 65
Discharge: HOME | End: 2025-05-06
Attending: INTERNAL MEDICINE
Payer: MEDICARE

## 2025-05-06 DIAGNOSIS — Z78.0 POSTMENOPAUSE: ICD-10-CM

## 2025-05-06 PROCEDURE — 77080 DXA BONE DENSITY AXIAL: CPT | Performed by: INTERNAL MEDICINE

## 2025-06-05 RX ORDER — ESCITALOPRAM OXALATE 10 MG/1
10 TABLET ORAL DAILY
Qty: 90 TABLET | Refills: 3 | Status: SHIPPED | OUTPATIENT
Start: 2025-06-05

## 2025-06-05 NOTE — TELEPHONE ENCOUNTER
Refill request is for a maintenance medication and has met the criteria specified in the Ambulatory Medication Refill Standing Order for eligibility, visits, laboratory, alerts and was sent to the requested pharmacy.    Requested Prescriptions     Signed Prescriptions Disp Refills    escitalopram 10 MG Oral Tab 90 tablet 3     Sig: TAKE ONE TABLET BY MOUTH ONE TIME DAILY     Authorizing Provider: MARBELLA CURTIS     Ordering User: ELVIA HAN

## (undated) DIAGNOSIS — M81.0 OSTEOPOROSIS: Primary | ICD-10-CM

## (undated) DIAGNOSIS — M85.80 OSTEOPENIA DETERMINED BY X-RAY: ICD-10-CM

## (undated) DIAGNOSIS — Z12.31 ENCOUNTER FOR SCREENING MAMMOGRAM FOR MALIGNANT NEOPLASM OF BREAST: Primary | ICD-10-CM

## (undated) DIAGNOSIS — Z51.81 MEDICATION MONITORING ENCOUNTER: ICD-10-CM

## (undated) DEVICE — STERILE TETRA-FLEX CF, ELASTIC BANDAGEE, 3" X 5.5YD: Brand: TETRA-FLEX™CF

## (undated) DEVICE — Device: Brand: DEFENDO AIR/WATER/SUCTION AND BIOPSY VALVE

## (undated) DEVICE — DRESSING BIOPATCH 1X4 CNTR

## (undated) DEVICE — Device

## (undated) DEVICE — SNARE OPTMZ PLPCTM TRP

## (undated) DEVICE — Device: Brand: CUSTOM PROCEDURE KIT

## (undated) DEVICE — DRILL BIT SYNT 4.3QC 310.431

## (undated) DEVICE — LINE MNTR ADLT SET O2 INTMD

## (undated) DEVICE — COTTON UNDERCAST PADDING,REGULAR FINISH: Brand: WEBRIL

## (undated) DEVICE — TRAY SRGPRP PVP IOD WT SCRB SM

## (undated) DEVICE — UNDYED BRAIDED (POLYGLACTIN 910), SYNTHETIC ABSORBABLE SUTURE: Brand: COATED VICRYL

## (undated) DEVICE — CO2 CANNULA,SSOFT,ADLT,7O2,4CO2,FEMALE: Brand: MEDLINE

## (undated) DEVICE — MEDI-VAC NON-CONDUCTIVE SUCTION TUBING 6MM X 1.8M (6FT.) L: Brand: CARDINAL HEALTH

## (undated) DEVICE — STERILE TETRA-FLEX CF, ELASTIC BANDAGE, 2" X 5.5YD: Brand: TETRA-FLEX™CF

## (undated) DEVICE — KIT DRN 3/16IN PVC DRN 3 SPRG

## (undated) DEVICE — DRILL BIT SYNT 2.0X125 310.21

## (undated) DEVICE — BATTERY

## (undated) DEVICE — COVER STND 54X23IN MAYO REINF

## (undated) DEVICE — GLOVE RAD 75 THK.23MM LF STRL

## (undated) DEVICE — TRAP POLYP W/ 2 SPEC TY CLR MAGNIFYING WIND

## (undated) DEVICE — SNARE CAPTIFLEX MICRO-OVL OLY

## (undated) DEVICE — LASSO POLYPECTOMY SNARE: Brand: LASSO

## (undated) DEVICE — IMMOBILIZER ORTH MED UNV

## (undated) DEVICE — SPONGE LAP 18X18 XRAY STRL

## (undated) DEVICE — GAMMEX® PI HYBRID SIZE 7.5, STERILE POWDER-FREE SURGICAL GLOVE, POLYISOPRENE AND NEOPRENE BLEND: Brand: GAMMEX

## (undated) DEVICE — FORCEP RADIAL JAW 4

## (undated) DEVICE — 3M™ TEGADERM™ TRANSPARENT FILM DRESSING, 1626W, 4 IN X 4-3/4 IN (10 CM X 12 CM), 50 EACH/CARTON, 4 CARTON/CASE: Brand: 3M™ TEGADERM™

## (undated) DEVICE — SUTURE ETHILON 4-0 1667G

## (undated) DEVICE — OCCLUSIVE GAUZE STRIP,3% BISMUTH TRIBROMOPHENATE IN PETROLATUM BLEND: Brand: XEROFORM

## (undated) DEVICE — PAD THRP WRPON UNV PLRCR SHLDR

## (undated) DEVICE — 3M™ STERI-STRIP™ REINFORCED ADHESIVE SKIN CLOSURES, R1547, 1/2 IN X 4 IN (12 MM X 100 MM), 6 STRIPS/ENVELOPE: Brand: 3M™ STERI-STRIP™

## (undated) DEVICE — CONMED SCOPE SAVER BITE BLOCK, 20X27 MM: Brand: SCOPE SAVER

## (undated) DEVICE — 35 ML SYRINGE REGULAR TIP: Brand: MONOJECT

## (undated) DEVICE — ENDOSCOPY PACK - LOWER: Brand: MEDLINE INDUSTRIES, INC.

## (undated) DEVICE — 3M™ IOBAN™ 2 ANTIMICROBIAL INCISE DRAPE 6640EZ: Brand: IOBAN™ 2

## (undated) DEVICE — FULL DRAPE

## (undated) DEVICE — CONMED ACCESSORY ELECTRODE, NEEDLE ELECTRODE

## (undated) DEVICE — KIT CLEAN ENDOKIT 1.1OZ GOWNX2

## (undated) DEVICE — KIT ENDO ORCAPOD 160/180/190

## (undated) DEVICE — ENCORE® LATEX MICRO SIZE 7.5, STERILE LATEX POWDER-FREE SURGICAL GLOVE: Brand: ENCORE

## (undated) DEVICE — BANDAGE ROLL,100% COTTON, 6 PLY, LARGE: Brand: KERLIX

## (undated) DEVICE — SUTURE VICRYL 0 CP-1

## (undated) DEVICE — GAMMEX® NON-LATEX PI ORTHO SIZE 7.5, STERILE POLYISOPRENE POWDER-FREE SURGICAL GLOVE: Brand: GAMMEX

## (undated) DEVICE — SUTURE VICRYL 2-0 FS-1

## (undated) DEVICE — POLAR CARE CUBE COOLING UNIT

## (undated) DEVICE — SHOULDER: Brand: MEDLINE INDUSTRIES, INC.

## (undated) DEVICE — WEBRIL COTTON UNDERCAST PADDING: Brand: WEBRIL

## (undated) DEVICE — STERILE TETRA-FLEX CF, ELASTIC BANDAGE, 4" X 5.5YD: Brand: TETRA-FLEX™CF

## (undated) DEVICE — INTENDED FOR TISSUE SEPARATION, AND OTHER PROCEDURES THAT REQUIRE A SHARP SURGICAL BLADE TO PUNCTURE OR CUT.: Brand: BARD-PARKER ® STAINLESS STEEL BLADES

## (undated) DEVICE — GAUZE SPONGES,12 PLY: Brand: CURITY

## (undated) DEVICE — DRAPE C-ARM UNIVERSAL

## (undated) DEVICE — SYRINGE, LUER SLIP, STERILE, 60ML: Brand: MEDLINE

## (undated) DEVICE — SUCTION CANISTER, 3000CC,SAFELINER: Brand: DEROYAL

## (undated) DEVICE — SOL  .9 1000ML BTL

## (undated) DEVICE — DRILL BIT SYNT 3.2X145 310.31

## (undated) NOTE — LETTER
[facility]  Administration of Anesthesia  INancy agree to be cared for by a physician anesthesiologist alone and/or with a nurse anesthetist, who is specially trained to monitor me and give me medicine to put me to sleep or keep me comfortable during my procedure    I understand that my anesthesiologist and/or anesthetist is not an employee or agent of Health system or [employer]. He or she works for [facility]    As the patient asking for anesthesia services, I agree to:  Allow the anesthesiologist (anesthesia doctor) to give me medicine and do additional procedures as necessary. Some examples are: Starting or using an “IV” to give me medicine, fluids or blood during my procedure, and having a breathing tube placed to help me breathe when I’m asleep (intubation). In the event that my heart stops working properly, I understand that my anesthesiologist will make every effort to sustain my life, unless otherwise directed by Health system Do Not Resuscitate documents.  Tell my anesthesia doctor before my procedure:  If I am pregnant.  The last time that I ate or drank.  iii. All of the medicines I take (including prescriptions, herbal supplements, and pills I can buy without a prescription (including street drugs/illegal medications). Failure to inform my anesthesiologist about these medicines may increase my risk of anesthetic complications.  iv.If I am allergic to anything or have had a reaction to anesthesia before.  I understand how the anesthesia medicine will help me (benefits).  I understand that with any type of anesthesia medicine there are risks:  The most common risks are: nausea, vomiting, sore throat, muscle soreness, damage to my eyes, mouth, or teeth (from breathing tube placement).  Rare risks include: remembering what happened during my procedure, allergic reactions to medications, injury to my airway, heart, lungs, vision, nerves, or muscles  and in extremely rare instances death.  My doctor has explained to me other choices available to me for my care (alternatives).  Pregnant Patients (“epidural”):  I understand that the risks of having an epidural (medicine given into my back to help control pain during labor), include itching, low blood pressure, difficulty urinating, headache or slowing of the baby’s heart. Very rare risks include infection, bleeding, seizure, irregular heart rhythms and nerve injury.  Regional Anesthesia (“spinal”, “epidural”, & “nerve blocks”):  I understand that rare but potential complications include headache, bleeding, infection, seizure, irregular heart rhythms, and nerve injury.    _____________________________________________________________________________  Patient (or Representative) Signature/Relationship to Patient  Date   Time    _____________________________________________________________________________   Name (if used)    Language/Organization   Time    _____________________________________________________________________________  Nurse Anesthetist Signature     Date   Time  _____________________________________________________________________________  Anesthesiologist Signature     Date   Time  I have discussed the procedure and information above with the patient (or patient’s representative) and answered their questions. The patient or their representative has agreed to have anesthesia services.    _____________________________________________________________________________  Witness        Date   Time  I have verified that the signature is that of the patient or patient’s representative, and that it was signed before the procedure  Patient Name: Nancy Ba     : 1960                 Printed: 2024 at 9:44 AM    Medical Record #: [id number]                                            Page 1 of 1  ----------ANESTHESIA CONSENT----------

## (undated) NOTE — LETTER
1/30/2024    Nancy Ba        [address] [facility] [address]            Dear Nancy Ba,      Our records indicate that you are due for an appointment for a Colonoscopy with Jaime Amezcua MD. Our doctors are booking out about 3-6 months in advance for procedures.     Please call our office to schedule a phone screening appointment to plan for the procedure(s).   Your medical well-being is important to us.    If your insurance requires a referral, please call your primary care office to request one.      Thank you,      The Physicians and Staff at [facility]

## (undated) NOTE — MR AVS SNAPSHOT
Samm Wyatt   3/20/2017 11:30 AM   Nurse Only   MRN:  [id number]    Description:  Female : 1960   Department:  [address] [place] [address]              Visit Summary      Primary Visit Diagnosis     Urothelial carcinoma

## (undated) NOTE — LETTER
10/2/2017    [facility] [id number]            Dear Triston Redd,      [facility] records indicate that you are due for an appointment for a Colonoscopy on or about December 2017 with Leo Kauffman MD.    Please c

## (undated) NOTE — MR AVS SNAPSHOT
Leonora Perkins   2017 2:30 PM   Office Visit   MRN:  [id number]    Description:  Female : 1960   Provider:  Percy Ruth   Department:  [facility] AND [facility] Hematology Oncology              Visit Summary      Allergies     No Known Al

## (undated) NOTE — LETTER
[facility]  [address], [place], [place]    Authorization for Surgical Operation and Procedure                               I hereby authorize Jaime Amezcua MD, my physician and his/her assistants (if applicable), which may include medical students, residents, and/or fellows, to perform the following surgical operation/ procedure and administer such anesthesia as may be determined necessary by my physician: Operation/Procedure name (s) COLONOSCOPY on Nancy Ba   2.   I recognize that during the surgical operation/procedure, unforeseen conditions may necessitate additional or different procedures than those listed above.  I, therefore, further authorize and request that the above-named surgeon, assistants, or designees perform such procedures as are, in their judgment, necessary and desirable.    3.   My surgeon/physician has discussed prior to my surgery the potential benefits, risks and side effects of this procedure; the likelihood of achieving goals; and potential problems that might occur during recuperation.  They also discussed reasonable alternatives to the procedure, including risks, benefits, and side effects related to the alternatives and risks related to not receiving this procedure.  I have had all my questions answered and I acknowledge that no guarantee has been made as to the result that may be obtained.    4.   Should the need arise during my operation/procedure, which includes change of level of care prior to discharge, I also consent to the administration of blood and/or blood products.  Further, I understand that despite careful testing and screening of blood or blood products by collecting agencies, I may still be subject to ill effects as a result of receiving a blood transfusion and/or blood products.  The following are some, but not all, of the potential risks that can occur: fever and allergic reactions, hemolytic reactions, transmission of diseases such as  Hepatitis, AIDS and Cytomegalovirus (CMV) and fluid overload.  In the event that I wish to have an autologous transfusion of my own blood, or a directed donor transfusion, I will discuss this with my physician.  Check only if Refusing Blood or Blood Products  I understand refusal of blood or blood products as deemed necessary by my physician may have serious consequences to my condition to include possible death. I hereby assume responsibility for my refusal and release the hospital, its personnel, and my physicians from any responsibility for the consequences of my refusal.    o  Refuse   5.   I authorize the use of any specimen, organs, tissues, body parts or foreign objects that may be removed from my body during the operation/procedure for diagnosis, research or teaching purposes and their subsequent disposal by hospital authorities.  I also authorize the release of specimen test results and/or written reports to my treating physician on the hospital medical staff or other referring or consulting physicians involved in my care, at the discretion of the Pathologist or my treating physician.    6.   I consent to the photographing or videotaping of the operations or procedures to be performed, including appropriate portions of my body for medical, scientific, or educational purposes, provided my identity is not revealed by the pictures or by descriptive texts accompanying them.  If the procedure has been photographed/videotaped, the surgeon will obtain the original picture, image, videotape or CD.  The hospital will not be responsible for storage, release or maintenance of the picture, image, tape or CD.    7.   I consent to the presence of a  or observers in the operating room as deemed necessary by my physician or their designees.    8.   I recognize that in the event my procedure results in extended X-Ray/fluoroscopy time, I may develop a skin reaction.    9. If I have a Do Not Attempt  Resuscitation (DNAR) order in place, that status will be suspended while in the operating room, procedural suite, and during the recovery period unless otherwise explicitly stated by me (or a person authorized to consent on my behalf). The surgeon or my attending physician will determine when the applicable recovery period ends for purposes of reinstating the DNAR order.  10. Patients having a sterilization procedure: I understand that if the procedure is successful the results will be permanent and it will therefore be impossible for me to inseminate, conceive, or bear children.  I also understand that the procedure is intended to result in sterility, although the result has not been guaranteed.   11. I acknowledge that my physician has explained sedation/analgesia administration to me including the risk and benefits I consent to the administration of sedation/analgesia as may be necessary or desirable in the judgment of my physician.    I CERTIFY THAT I HAVE READ AND FULLY UNDERSTAND THE ABOVE CONSENT TO OPERATION and/or OTHER PROCEDURE.     ____________________________________  _________________________________        ______________________________  Signature of Patient    Signature of Responsible Person                Printed Name of Responsible Person                                      ____________________________________  _____________________________                ________________________________  Signature of Witness        Date  Time         Relationship to Patient    STATEMENT OF PHYSICIAN My signature below affirms that prior to the time of the procedure; I have explained to the patient and/or his/her legal representative, the risks and benefits involved in the proposed treatment and any reasonable alternative to the proposed treatment. I have also explained the risks and benefits involved in refusal of the proposed treatment and alternatives to the proposed treatment and have answered the patient's  questions. If I have a significant financial interest in a co-management agreement or a significant financial interest in any product or implant, or other significant relationship used in this procedure/surgery, I have disclosed this and had a discussion with my patient.     _____________________________________________________              _____________________________  (Signature of Physician)                                                                                         (Date)                                   (Time)  Patient Name: Nancy Ba      : 1960      Printed: 2024     Medical Record #: [id number]                                      Page 1 of 1

## (undated) NOTE — MR AVS SNAPSHOT
Samm Wyatt   2017 1:30 PM   Nurse Only   MRN:  [id number]    Description:  Female : 1960   Department:  [address] [place] [address]              Visit Summary      Primary Visit Diagnosis     Urothelial carcinoma ---------                               -----------         ------                     CBC W/ DIFFERENTIAL[[id number]]                              In process                   Please view results for these tests on the individual orders.                   R

## (undated) NOTE — Clinical Note
Hi,   She should have port removed. Can your office call her?     Thanks,    [employer] [facility]

## (undated) NOTE — LETTER
1/27/2021    Lynnette Israel        [address]        [facility] [address]            Dear Lynnette Israel,      Our records indicate that you are due for an appointment for a Colonoscopy in February 2021, or shortly there after, with Pierce Woody

## (undated) NOTE — MR AVS SNAPSHOT
After Visit Summary   8/5/2019    Daniel Spear    MRN: [id number]           Visit Information     Date & Time  8/5/2019  9:30 AM Provider  [facility] [address] [address] Dept.  Phone  [phone] [facility] now offers Video Visits through [address] for adult and pediatric patients. Video Visits are available Monday - Friday for many common conditions such as allergies, colds, cough, fever, rash, sore throat, headache and pink eye.   The cost for a Video Vi [address]   Monday – Friday  4:00 pm – 10:00 pm   Saturday – Sunday  10:00 am – 4:00 pm  WALK-IN CARE  Emergency Medicine Providers  Conditions needing urgent attention, but are   non-life-threatening.     Also available by appointment Average cost  $120*

## (undated) NOTE — MR AVS SNAPSHOT
Hermann Sandifer   3/20/2017 1:00 PM   Office Visit   MRN:  [id number]    Description:  Female : 1960   Provider:  Kari Davies   Department:  [facility] AND [facility] Hematology Oncology              Visit Summary      Primary Visit Diagnosis Oncology ([phone])   [address]   [address] [id number]            [facility]

## (undated) NOTE — MR AVS SNAPSHOT
[place]  [address]  [phone]               Thank you for choosing us for your health care visit with Rock Moses Boswell MD.  We are glad to serve you and happy to provide you with this summa Take 200 mg by mouth every 6 (six) hours as needed for Pain. anastrozole 1 MG Tabs tab   Take 1 tablet (1 mg total) by mouth daily. What changed:  Another medication with the same name was removed.  Continue taking this medication, and follow th

## (undated) NOTE — LETTER
[facility]  Administration of Anesthesia  1. Kenzie Avitia, or _________________________________ acting on her behalf, (Patient) (Dependent/Representative) request to receive anesthesia for my pending procedure/operation/treatment.   A ph infections, high spinal block, spinal bleeding, seizure, cardiac arrest and death. 7. AWARENESS: I understand that it is possible (but unlikely) to have explicit memory of events from the operating room while under general anesthesia.   8. ELECTROCONVULSIV unconscious pt /Relationship    My signature below affirms that prior to the time of the procedure, I have explained to the patient and/or his/her guardian, the risks and benefits of undergoing anesthesia, as well as any reasonable alternatives.     _______

## (undated) NOTE — LETTER
[facility], [place]  Authorization for Invasive Procedures  1.  I hereby authorize Dr. Jerry Guidry, [place] , my physician and whomever may be designated as the doctor's assistant, to perform the following operation and/or procedure:  ES 5. I consent to the photographing of the operations or procedures to be performed for the purposes of advancing medicine, science, and/or education, provided my identity is not revealed.  If the procedure has been videotaped, the physician/surgeon will obta __________ Time: ___________    Statement of Physician  My signature below affirms that prior to the time of the procedure, I have explained to the patient and/or her legal representative, the risks and benefits involved in the proposed treatment and any r